# Patient Record
Sex: FEMALE | Race: WHITE | HISPANIC OR LATINO | Employment: FULL TIME | URBAN - METROPOLITAN AREA
[De-identification: names, ages, dates, MRNs, and addresses within clinical notes are randomized per-mention and may not be internally consistent; named-entity substitution may affect disease eponyms.]

---

## 2017-01-18 RX ORDER — HYDROCORTISONE 2.5% 25 MG/G
CREAM TOPICAL
Qty: 30 G | Refills: 0 | Status: SHIPPED | OUTPATIENT
Start: 2017-01-18 | End: 2018-05-16

## 2017-03-01 ENCOUNTER — TELEPHONE (OUTPATIENT)
Dept: FAMILY MEDICINE | Facility: CLINIC | Age: 52
End: 2017-03-01

## 2017-03-01 NOTE — TELEPHONE ENCOUNTER
----- Message from Mehreen Salazar sent at 3/1/2017  9:02 AM CST -----  Contact: self  Would like an appt after lunch for possible sinus infection.  Please call back at .

## 2017-03-01 NOTE — TELEPHONE ENCOUNTER
Returned pt call. Informed pt that Dr Cuello didn't have any availabilities and offered pt appt in [place]. Pt refused stating that she will go to

## 2017-07-20 RX ORDER — BUPROPION HYDROCHLORIDE 200 MG/1
TABLET, EXTENDED RELEASE ORAL
Qty: 180 TABLET | Refills: 0 | Status: SHIPPED | OUTPATIENT
Start: 2017-07-20 | End: 2018-05-23 | Stop reason: SDUPTHER

## 2017-07-21 NOTE — TELEPHONE ENCOUNTER
----- Message from Misty Koenig sent at 7/21/2017  9:21 AM CDT -----  Contact: self    [phone]  Patient returning the nurse phone call.Thanks!

## 2017-07-21 NOTE — TELEPHONE ENCOUNTER
Pt informed of the need to maria ines appt for future med refills, per protocol. Pt states that she is unable to maria ines appt at the moment and will call back to [facility] when she has her calendar available. Pt verbalized understanding.

## 2017-07-27 ENCOUNTER — TELEPHONE (OUTPATIENT)
Dept: FAMILY MEDICINE | Facility: CLINIC | Age: 52
End: 2017-07-27

## 2017-07-27 DIAGNOSIS — G47.33 OSA (OBSTRUCTIVE SLEEP APNEA): Primary | ICD-10-CM

## 2017-07-27 NOTE — TELEPHONE ENCOUNTER
----- Message from Jess De La O sent at 7/27/2017  9:36 AM CDT -----  Contact: self  Patient called regarding scheduling another [facility] with [facility] [place]. Not sure if another referral is needed to do so. Please contact [phone] (home)

## 2017-08-23 ENCOUNTER — OFFICE VISIT (OUTPATIENT)
Dept: FAMILY MEDICINE | Facility: CLINIC | Age: 52
End: 2017-08-23
Payer: COMMERCIAL

## 2017-08-23 VITALS
BODY MASS INDEX: 27.67 KG/M2 | HEIGHT: 66 IN | HEART RATE: 88 BPM | SYSTOLIC BLOOD PRESSURE: 112 MMHG | TEMPERATURE: 98 F | DIASTOLIC BLOOD PRESSURE: 68 MMHG | WEIGHT: 172.19 LBS

## 2017-08-23 DIAGNOSIS — J02.9 PHARYNGITIS, UNSPECIFIED ETIOLOGY: Primary | ICD-10-CM

## 2017-08-23 LAB
CTP QC/QA: YES
S PYO RRNA THROAT QL PROBE: NEGATIVE

## 2017-08-23 PROCEDURE — 99213 OFFICE O/P EST LOW 20 MIN: CPT | Mod: S$GLB,,, | Performed by: FAMILY MEDICINE

## 2017-08-23 PROCEDURE — 3008F BODY MASS INDEX DOCD: CPT | Mod: S$GLB,,, | Performed by: FAMILY MEDICINE

## 2017-08-23 PROCEDURE — 99999 PR PBB SHADOW E&M-EST. PATIENT-LVL III: CPT | Mod: PBBFAC,,, | Performed by: FAMILY MEDICINE

## 2017-08-23 PROCEDURE — 87880 STREP A ASSAY W/OPTIC: CPT | Mod: QW,S$GLB,, | Performed by: FAMILY MEDICINE

## 2017-08-23 NOTE — PATIENT INSTRUCTIONS
For a sore throat:  Slice a lemon very thin.  Place in sauce pan with 2 big glops of honey  Simmer low heat for 30 minutes. Dilute with tea or water and sip to soothe and heal your sore raw throat.

## 2017-08-23 NOTE — PROGRESS NOTES
"Subjective:       Patient ID: Georgia Gomez is a 52 y.o. female.    Chief Complaint: Sore Throat    Ms Gomez, 53yo WF, PMH JUSTIN, headache, depression. Presents w/ painful throat, diffuse body aches, cough, stuffy/runny nose.    Stuffy/runny nose began several days ago. Progressed to painful throat like "glass in throat" by yesterday. Accompanied by diffuse body aches. Has started coughing, nonproductive. Has tried advil and hot liquids which help.   Denies fever/chills/nausea/vomiting. Denies bowel changes, SOB, chest pain.   Was in [place] for vacation.     Performed POCT rapid strep - negative.     Counseled on hydration and supportive treatments like lemon and honey.       Dizziness:    Associated symptoms: headaches.no weakness.   PMH includes: anxiety.  Anxiety   Symptoms include dizziness.       Sore Throat    Associated symptoms include headaches.     Review of Systems   Constitutional: Positive for activity change.   Eyes: Negative for discharge.   Respiratory: Negative.    Cardiovascular: Negative.    Gastrointestinal: Negative.    Musculoskeletal: Positive for myalgias.   Neurological: Positive for headaches. Negative for syncope and weakness.       Objective:     Blood pressure 112/68, pulse 88, temperature 98.2 °F (36.8 °C), temperature source Oral, height 5' 6" (1.676 m), weight 78.1 kg (172 lb 2.9 oz), last menstrual period 10/01/2014.      Physical Exam   Constitutional: She is oriented to person, place, and time. She appears well-developed and well-nourished. No distress.   HENT:   Head: Normocephalic and atraumatic.   Right Ear: External ear and ear canal normal. No swelling or tenderness. Tympanic membrane is not injected, not scarred, not perforated, not erythematous, not retracted and not bulging.   Left Ear: External ear and ear canal normal. No swelling or tenderness. Tympanic membrane is not injected, not scarred, not perforated, not erythematous, not retracted and not bulging.   Nose: Nose " normal.   Mouth/Throat: Oropharynx is clear and moist and mucous membranes are normal. Mucous membranes are not pale, not dry and not cyanotic. Normal dentition. No uvula swelling. No oropharyngeal exudate or tonsillar abscesses. No tonsillar exudate.   Eyes: Conjunctivae and EOM are normal. Pupils are equal, round, and reactive to light. Right eye exhibits no discharge. Left eye exhibits no discharge. No scleral icterus.   Neck: Normal range of motion.   Cardiovascular: Normal rate, regular rhythm and normal heart sounds.  Exam reveals no gallop and no friction rub.    No murmur heard.  Pulmonary/Chest: Effort normal and breath sounds normal. No respiratory distress. She has no wheezes. She exhibits no tenderness.   Abdominal: Soft. Bowel sounds are normal.   Musculoskeletal: Normal range of motion. She exhibits no edema, tenderness or deformity.   Lymphadenopathy:     She has no cervical adenopathy.   Neurological: She is alert and oriented to person, place, and time.   Skin: Skin is warm and dry. No rash noted. She is not diaphoretic. No erythema. No pallor.   Psychiatric: She has a normal mood and affect. Her behavior is normal. Judgment and thought content normal.       Assessment:       1. Pharyngitis, unspecified etiology        Plan:        1. Likely viral pharyngitis. No lymph nodes, no pharyngeal, no fever. Strep test negative. Counseled on supportive treatment with fluids and liquids. Advised to contact if symptoms become worse.

## 2017-10-26 RX ORDER — BUPROPION HYDROCHLORIDE 200 MG/1
TABLET, EXTENDED RELEASE ORAL
Qty: 180 TABLET | Refills: 0 | Status: SHIPPED | OUTPATIENT
Start: 2017-10-26 | End: 2018-05-16 | Stop reason: SDUPTHER

## 2018-04-18 ENCOUNTER — HOSPITAL ENCOUNTER (OUTPATIENT)
Dept: RADIOLOGY | Facility: HOSPITAL | Age: 53
Discharge: HOME | End: 2018-04-18
Attending: PHYSICIAN ASSISTANT
Payer: COMMERCIAL

## 2018-04-18 ENCOUNTER — OFFICE VISIT (OUTPATIENT)
Dept: SPINE | Facility: CLINIC | Age: 53
End: 2018-04-18
Payer: COMMERCIAL

## 2018-04-18 VITALS
BODY MASS INDEX: 27.99 KG/M2 | HEIGHT: 66 IN | HEART RATE: 88 BPM | WEIGHT: 174.19 LBS | SYSTOLIC BLOOD PRESSURE: 96 MMHG | DIASTOLIC BLOOD PRESSURE: 60 MMHG

## 2018-04-18 DIAGNOSIS — M25.551 RIGHT HIP PAIN: ICD-10-CM

## 2018-04-18 DIAGNOSIS — M54.16 LUMBAR RADICULOPATHY: ICD-10-CM

## 2018-04-18 DIAGNOSIS — M54.16 LUMBAR RADICULOPATHY: Primary | ICD-10-CM

## 2018-04-18 DIAGNOSIS — M54.41 ACUTE RIGHT-SIDED LOW BACK PAIN WITH RIGHT-SIDED SCIATICA: ICD-10-CM

## 2018-04-18 PROCEDURE — 73502 X-RAY EXAM HIP UNI 2-3 VIEWS: CPT | Mod: TC,PO,RT

## 2018-04-18 PROCEDURE — 73502 X-RAY EXAM HIP UNI 2-3 VIEWS: CPT | Mod: 26,RT,, | Performed by: RADIOLOGY

## 2018-04-18 PROCEDURE — 72114 X-RAY EXAM L-S SPINE BENDING: CPT | Mod: TC,PO

## 2018-04-18 PROCEDURE — 99203 OFFICE O/P NEW LOW 30 MIN: CPT | Mod: S$GLB,,, | Performed by: PHYSICIAN ASSISTANT

## 2018-04-18 PROCEDURE — 72114 X-RAY EXAM L-S SPINE BENDING: CPT | Mod: 26,,, | Performed by: RADIOLOGY

## 2018-04-18 PROCEDURE — 99999 PR PBB SHADOW E&M-EST. PATIENT-LVL III: CPT | Mod: PBBFAC,,, | Performed by: PHYSICIAN ASSISTANT

## 2018-04-18 RX ORDER — ESTRADIOL 0.05 MG/D
1 FILM, EXTENDED RELEASE TRANSDERMAL
COMMUNITY

## 2018-04-18 RX ORDER — METHYLPREDNISOLONE 4 MG/1
TABLET ORAL
Qty: 1 PACKAGE | Refills: 0 | Status: SHIPPED | OUTPATIENT
Start: 2018-04-18 | End: 2018-05-09

## 2018-04-19 RX ORDER — TIZANIDINE 4 MG/1
4 TABLET ORAL EVERY 8 HOURS PRN
Qty: 40 TABLET | Refills: 0 | Status: SHIPPED | OUTPATIENT
Start: 2018-04-19 | End: 2018-05-29

## 2018-04-20 NOTE — PROGRESS NOTES
Neurosurgery History & Physical    Patient ID: Georgia Gomez is a 52 y.o. female.    Chief Complaint   Patient presents with    Leg Pain     Has had pain in her right leg for many months. Pain is constant. Can't put weight on right side. Nothing makes pain better. Sitting makes pain worse.       Review of Systems   Constitutional: Negative for activity change, appetite change, chills, fever and unexpected weight change.   HENT: Negative for tinnitus, trouble swallowing and voice change.    Respiratory: Negative for apnea, cough, chest tightness and shortness of breath.    Cardiovascular: Negative for chest pain and palpitations.   Gastrointestinal: Negative for constipation, diarrhea, nausea and vomiting.   Genitourinary: Negative for difficulty urinating, dysuria, frequency and urgency.   Musculoskeletal: Positive for back pain and myalgias. Negative for gait problem, neck pain and neck stiffness.   Skin: Negative for wound.   Neurological: Negative for dizziness, tremors, seizures, facial asymmetry, speech difficulty, weakness, light-headedness, numbness and headaches.   Psychiatric/Behavioral: Negative for confusion and decreased concentration.       Past Medical History:   Diagnosis Date    Arthritis     Headache(784.0)     Rash      Social History     Social History    Marital status:      Spouse name: N/A    Number of children: N/A    Years of education: N/A     Occupational History    Not on file.     Social History Main Topics    Smoking status: Never Smoker    Smokeless tobacco: Never Used    Alcohol use Yes      Comment: occ    Drug use: No    Sexual activity: Not on file     Other Topics Concern    Not on file     Social History Narrative    She currently works as a .      Family History   Problem Relation Age of Onset    Diabetes Mother     Hypertension Mother     Cancer Father      lung ca    Heart disease Father     Hyperlipidemia Father     Hypertension  "Father     Cancer Sister      Non Hodgkin's lymphoma    No Known Problems Daughter     No Known Problems Son      Review of patient's allergies indicates:  No Known Allergies    Current Outpatient Prescriptions:     estradiol 0.05 mg/24 hr td ptsw (VIVELLE-DOT) 0.05 mg/24 hr, Place 1 patch onto the skin once a week., Disp: , Rfl:     buPROPion (WELLBUTRIN SR) 200 MG Tb12, TAKE 1 TABLET(200 MG) BY MOUTH TWICE DAILY, Disp: 180 tablet, Rfl: 0    buPROPion (WELLBUTRIN SR) 200 MG Tb12, TAKE 1 TABLET(200 MG) BY MOUTH TWICE DAILY, Disp: 180 tablet, Rfl: 0    CMPD testosterone proprionate 2% in vanicream, Apply topically once daily. Apply 1 gram as directed 2-3 times weekly., Disp: , Rfl:     ibuprofen (ADVIL,MOTRIN) 400 MG tablet, Take 400 mg by mouth every 4 (four) hours., Disp: , Rfl:     methylPREDNISolone (MEDROL, KAUSHIK,) 4 mg tablet, use as directed, Disp: 1 Package, Rfl: 0    multivitamin (ONE DAILY MULTIVITAMIN) per tablet, Take 1 tablet by mouth once daily., Disp: , Rfl:     PROCTOZONE-HC 2.5 % rectal cream, PLACE RECTALLY TWICE DAILY AS DIRECTED, Disp: 30 g, Rfl: 0    tiZANidine (ZANAFLEX) 4 MG tablet, Take 1 tablet (4 mg total) by mouth every 8 (eight) hours as needed (muscle spasm)., Disp: 40 tablet, Rfl: 0    Vitals:    04/18/18 1346   BP: 96/60   BP Location: Left arm   Patient Position: Sitting   BP Method: Medium (Manual)   Pulse: 88   Weight: 79 kg (174 lb 2.6 oz)   Height: 5' 6" (1.676 m)       Physical Exam   Constitutional: She is oriented to person, place, and time. She appears well-developed and well-nourished.   HENT:   Head: Normocephalic and atraumatic.   Eyes: Pupils are equal, round, and reactive to light.   Neck: Normal range of motion. Neck supple.   Cardiovascular: Normal rate.    Pulmonary/Chest: Effort normal.   Musculoskeletal: Normal range of motion. She exhibits no edema.   Neurological: She is alert and oriented to person, place, and time. She has a normal Finger-Nose-Finger " Test, a normal Heel to Shin Test, a normal Romberg Test and a normal Tandem Gait Test. Gait normal.   Reflex Scores:       Tricep reflexes are 2+ on the right side and 2+ on the left side.       Bicep reflexes are 2+ on the right side and 2+ on the left side.       Brachioradialis reflexes are 2+ on the right side and 2+ on the left side.       Patellar reflexes are 2+ on the right side and 2+ on the left side.       Achilles reflexes are 2+ on the right side and 2+ on the left side.  Skin: Skin is warm, dry and intact.   Psychiatric: She has a normal mood and affect. Her speech is normal and behavior is normal. Judgment and thought content normal.   Nursing note and vitals reviewed.      Neurologic Exam     Mental Status   Oriented to person, place, and time.   Oriented to person.   Oriented to place.   Oriented to time.   Follows 3 step commands.   Attention: normal. Concentration: normal.   Speech: speech is normal   Level of consciousness: alert  Knowledge: consistent with education.   Able to name object. Able to read. Able to repeat. Able to write. Normal comprehension.     Cranial Nerves     CN II   Visual acuity: normal  Right visual field deficit: none  Left visual field deficit: none     CN III, IV, VI   Pupils are equal, round, and reactive to light.  Right pupil: Size: 3 mm. Shape: regular. Reactivity: brisk. Consensual response: intact.   Left pupil: Size: 3 mm. Shape: regular. Reactivity: brisk. Consensual response: intact.   CN III: no CN III palsy  CN VI: no CN VI palsy  Nystagmus: none   Diplopia: none  Ophthalmoparesis: none  Conjugate gaze: present    CN V   Right facial sensation deficit: none  Left facial sensation deficit: none    CN VII   Right facial weakness: none  Left facial weakness: none    CN VIII   Hearing: intact    CN IX, X   CN IX normal.   CN X normal.     CN XI   Right sternocleidomastoid strength: normal  Left sternocleidomastoid strength: normal  Right trapezius strength:  normal  Left trapezius strength: normal    CN XII   Fasciculations: absent  Tongue deviation: none    Motor Exam   Muscle bulk: normal  Overall muscle tone: normal  Right arm pronator drift: absent  Left arm pronator drift: absent    Strength   Right neck flexion: 5/5  Left neck flexion: 5/5  Right neck extension: 5/5  Left neck extension: 5/5  Right deltoid: 5/5  Left deltoid: 5/5  Right biceps: 5/5  Left biceps: 5/5  Right triceps: 5/5  Left triceps: 5/5  Right wrist flexion: 5/5  Left wrist flexion: 5/5  Right wrist extension: 5/5  Left wrist extension: /5  Right interossei: /5  Left interossei: /  Right abdominals:   Left abdominals:   Right iliopsoas:   Left iliopsoas:   Right quadriceps:   Left quadriceps:   Right hamstrin/5  Left hamstrin/5  Right glutei:   Left glutei:   Right anterior tibial:   Left anterior tibial:   Right posterior tibial:   Left posterior tibial:   Right peroneal:   Left peroneal:   Right gastroc:   Left gastroc:     Sensory Exam   Right arm light touch: normal  Left arm light touch: normal  Right leg light touch: normal  Left leg light touch: normal  Right arm vibration: normal  Left arm vibration: normal  Right arm pinprick: normal  Left arm pinprick: normal    Gait, Coordination, and Reflexes     Gait  Gait: normal    Coordination   Romberg: negative  Finger to nose coordination: normal  Heel to shin coordination: normal  Tandem walking coordination: normal    Tremor   Resting tremor: absent  Intention tremor: absent  Action tremor: absent    Reflexes   Right brachioradialis: 2+  Left brachioradialis: 2+  Right biceps: 2+  Left biceps: 2+  Right triceps: 2+  Left triceps: 2+  Right patellar: 2+  Left patellar: 2+  Right achilles: 2+  Left achilles: 2+  Right Harper: absent  Left Harper: absent  Right ankle clonus: absent  Left ankle clonus: absent      Provider dictation:  52 year old female is self referred for right leg  pain.  She has felt pain from the right buttocks to the right kinsey-lateral thigh for months.  It is described as a shooting pain.  It is progressing in intensity.  She is tender to the touch over the right lateral hip area.  She has tried advil without improvement and has not had any other treatments.  She is hoping to get relief soon as her daughter is getting  in 9 days.  Oswestry score: 22%.  PHQ:  1.    She has an antalgic gait and sits with the right leg straight due to pain.  She has full strength and no sensory deficits.  She as 2+ muscle stretch reflexes in the upper and lower extremities.    She has not had any imaging.    Mrs. Gomez has right lateral thigh pain - possibly right L3 radiculopathy without focal neurological deficit.  I am going to prescribe a medrol dose pack and zanaflex to help with pain.  Hopefully it helps quickly in time for her daughters wedding.  We also discussed referring her for [place], but she would need an MRI prior to it and it may not be enough notice for our pain management physicians to fit her in their schedule for the injection prior to the wedding.  She prefers to hold on MRI and any injection until after the wedding.  I will send her for xrays today of the lower back and right hip to rule out hip pathology and assess for any lumbar pathology that may be contributing to symptoms.  I will call with xray results.    Visit Diagnosis:  Lumbar radiculopathy  -     methylPREDNISolone (MEDROL, KAUSHIK,) 4 mg tablet; use as directed  Dispense: 1 Package; Refill: 0  -     Cancel: MRI Lumbar Spine Without Contrast; Future; Expected date: 04/18/2018  -     X-Ray Lumbar Complete With Flex And Ext; Future; Expected date: 04/18/2018  -     tiZANidine (ZANAFLEX) 4 MG tablet; Take 1 tablet (4 mg total) by mouth every 8 (eight) hours as needed (muscle spasm).  Dispense: 40 tablet; Refill: 0    Acute right-sided low back pain with right-sided sciatica  -     X-Ray Lumbar Complete With  Flex And Ext; Future; Expected date: 04/18/2018  -     tiZANidine (ZANAFLEX) 4 MG tablet; Take 1 tablet (4 mg total) by mouth every 8 (eight) hours as needed (muscle spasm).  Dispense: 40 tablet; Refill: 0    Right hip pain  -     X-Ray Hip 2 View Right; Future; Expected date: 04/18/2018  -     tiZANidine (ZANAFLEX) 4 MG tablet; Take 1 tablet (4 mg total) by mouth every 8 (eight) hours as needed (muscle spasm).  Dispense: 40 tablet; Refill: 0        Total time spent counseling greater than fifty percent of total visit time.  Counseling included discussion regarding imaging findings, diagnosis possibilities, treatment options, risks and benefits.   The patient had many questions regarding the options and long-term effects.

## 2018-05-16 ENCOUNTER — TELEPHONE (OUTPATIENT)
Dept: FAMILY MEDICINE | Facility: CLINIC | Age: 53
End: 2018-05-16

## 2018-05-16 ENCOUNTER — OFFICE VISIT (OUTPATIENT)
Dept: FAMILY MEDICINE | Facility: CLINIC | Age: 53
End: 2018-05-16
Payer: COMMERCIAL

## 2018-05-16 VITALS
SYSTOLIC BLOOD PRESSURE: 120 MMHG | HEART RATE: 80 BPM | HEIGHT: 66 IN | DIASTOLIC BLOOD PRESSURE: 82 MMHG | TEMPERATURE: 99 F | OXYGEN SATURATION: 99 % | WEIGHT: 173.75 LBS | BODY MASS INDEX: 27.92 KG/M2

## 2018-05-16 DIAGNOSIS — J02.9 PHARYNGITIS, UNSPECIFIED ETIOLOGY: Primary | ICD-10-CM

## 2018-05-16 DIAGNOSIS — Z12.39 SCREENING BREAST EXAMINATION: Primary | ICD-10-CM

## 2018-05-16 DIAGNOSIS — R50.9 ACUTE FEBRILE ILLNESS: ICD-10-CM

## 2018-05-16 DIAGNOSIS — M79.10 MYALGIA: ICD-10-CM

## 2018-05-16 DIAGNOSIS — B34.9 ACUTE VIRAL SYNDROME: ICD-10-CM

## 2018-05-16 LAB
CTP QC/QA: YES
CTP QC/QA: YES
FLUAV AG NPH QL: NEGATIVE
FLUBV AG NPH QL: NEGATIVE
S PYO RRNA THROAT QL PROBE: NEGATIVE

## 2018-05-16 PROCEDURE — 87804 INFLUENZA ASSAY W/OPTIC: CPT | Mod: QW,S$GLB,, | Performed by: INTERNAL MEDICINE

## 2018-05-16 PROCEDURE — 3008F BODY MASS INDEX DOCD: CPT | Mod: CPTII,S$GLB,, | Performed by: INTERNAL MEDICINE

## 2018-05-16 PROCEDURE — 87880 STREP A ASSAY W/OPTIC: CPT | Mod: QW,S$GLB,, | Performed by: INTERNAL MEDICINE

## 2018-05-16 PROCEDURE — 99999 PR PBB SHADOW E&M-EST. PATIENT-LVL III: CPT | Mod: PBBFAC,,, | Performed by: INTERNAL MEDICINE

## 2018-05-16 PROCEDURE — 99213 OFFICE O/P EST LOW 20 MIN: CPT | Mod: 25,S$GLB,, | Performed by: INTERNAL MEDICINE

## 2018-05-16 PROCEDURE — 87081 CULTURE SCREEN ONLY: CPT

## 2018-05-16 NOTE — PATIENT INSTRUCTIONS
Pharyngitis, unspecified etiology; chloraseptic for sore throat; warm salt water gargle 1-3x a day as needed; tylenol sore throat as need as well.         Will also check monospot and CBC; call us in next few days to update on status  -     POCT Rapid Strep A  -     Strep A culture, throat  -     POCT Influenza A/B    Acute viral syndrome; tylenol sore throat will also help w this entity. Rest w fluids  -     POCT Rapid Strep A  -     Strep A culture, throat  -     POCT Influenza A/B    Acute febrile illness; XS tylenol for fever; max 3000 mg a day of tylenol; separate from tylenol sore throat by 4 hrs if used. Aleve for persistent temp as needed.    Myalgia; rest and fluids

## 2018-05-16 NOTE — LETTER
May 17, 2018    Georgia Gomez  [address] [place] [address]             [facility]  [address]  [place] [address]  Phone: [phone]  Fax: [phone] Dear Ms. Castilloviridiana:    Our records indicate that you are due for a mammogram.    In the [place], one in nine women will develop breast cancer during their lifetime. While there is no way to prevent breast cancer, early detection provides the best opportunity for curing it.     For women over the age of 40, the American Cancer Society recommends a yearly clinical breast exam and a yearly mammogram. These practices have saved thousands of lives. We need your help to ensure that you are receiving optimal medical care.    Please make an appointment for a mammogram at your earliest convenience.    Sincerely,        [unfilled]

## 2018-05-16 NOTE — PROGRESS NOTES
Subjective:       Patient ID: Georgia Gomez is a 53 y.o. female.    Chief Complaint: Fever (all started yesterday); Sore Throat; and Generalized Body Aches    HPI  Pt today here in the office for a PCP Dr.Lauren Cuello.  Patient is a nonsmoker and has occasional seasonal ALLERGIES; but reportedly not at present. She reportedly hurts to swallow.  She's had fever at 100.5, 100.2, 100.7 last 24 hours.  She takes Motrin 4-6 hours when necessary temp; no recent travel noted.  Sore throat started Monday in the morning with a lump sensation since then.  No postnasal drip or cough.  She does have diffuse myalgias but no headaches, but fatigue which is rather intense lately, but she has no cough, nausea, vomiting, or abdominal pain, and no colored phlegm.  Strep A screen and culture Strep A was ordered; she reportedly had sweating last night; states it feels like the flu.  She has no other sick family members.    Review of Systems   Constitutional: Positive for activity change, appetite change, chills, diaphoresis, fatigue and fever. Negative for unexpected weight change.   HENT: Positive for sore throat and trouble swallowing. Negative for congestion, postnasal drip, rhinorrhea and sinus pressure.    Respiratory: Negative for cough, shortness of breath and wheezing.    Cardiovascular: Negative for palpitations and leg swelling.   Gastrointestinal: Negative for abdominal pain, blood in stool, constipation, diarrhea, nausea and vomiting.   Genitourinary: Negative for dysuria and hematuria.   Musculoskeletal: Positive for myalgias. Negative for arthralgias.        Diffuse myalgias.   Skin: Negative for rash.   Allergic/Immunologic: Negative for environmental allergies and food allergies.   Neurological: Negative for tremors, weakness, light-headedness and headaches.       Objective:      Vitals:    05/16/18 1012   BP: 120/82   BP Location: Right arm   Patient Position: Sitting   BP Method: Medium (Manual)   Pulse: 80   Temp:  "98.5 °F (36.9 °C)   TempSrc: Oral   SpO2: 99%   Weight: 78.8 kg (173 lb 11.6 oz)   Height: 5' 6" (1.676 m)     Body mass index is 28.04 kg/m².    Physical Exam   Constitutional: She is oriented to person, place, and time. She appears well-developed and well-nourished.   HENT:   Head: Normocephalic and atraumatic.   TM's pink; slightly congested; NM swollen; slightly inflamed; clear to yel-white mucus; throat and post pharynx inflamed. No sinus tenderness to palp.   Eyes: EOM are normal.   Neck: Normal range of motion. Neck supple. No thyromegaly present.   Cardiovascular: Normal rate, regular rhythm and normal heart sounds.  Exam reveals no gallop.    No murmur heard.  Pulmonary/Chest: Effort normal and breath sounds normal. No respiratory distress. She has no wheezes. She has no rales.   Abdominal: Soft. Bowel sounds are normal. She exhibits no distension. There is no tenderness. There is no rebound and no guarding.   Musculoskeletal: Normal range of motion. She exhibits no edema.   Lymphadenopathy:     She has no cervical adenopathy.   Neurological: She is alert and oriented to person, place, and time.   Moves all 4 extremities fine.   Skin: No rash noted.   Psychiatric: She has a normal mood and affect. Her behavior is normal. Thought content normal.   Vitals reviewed.      Assessment:       1. Pharyngitis, unspecified etiology    2. Acute viral syndrome    3. Acute febrile illness    4. Myalgia        Plan:       Pharyngitis, unspecified etiology; chloraseptic for sore throat; warm salt water gargle 1-3x a day as needed; tylenol sore throat as need as well.         Will also check monospot and CBC; call us in next few days to update on status  -     POCT Rapid Strep A was reportedly negative  -     Strep A culture, throat; no strep group A isolated from culture  -     POCT Influenza A/B; which returned back negative    Acute viral syndrome; tylenol sore throat will also help w this entity. Rest w fluids  -     " POCT Rapid Strep A was reportedly negative  -     Strep A culture, throat; no strep group A isolated from culture  -     POCT Influenza A/B; which returned back negative    Acute febrile illness; XS tylenol for fever; max 3000 mg a day of tylenol; separate from tylenol sore throat by 4 hrs if used. Aleve for persistent temp as needed.    Myalgia; rest and fluids

## 2018-05-16 NOTE — TELEPHONE ENCOUNTER
Pt need annual mammogram.     Orders pending.     Last mammogram was done on 04/24/2017    Thank you.

## 2018-05-18 LAB — BACTERIA THROAT CULT: NORMAL

## 2018-05-21 ENCOUNTER — TELEPHONE (OUTPATIENT)
Dept: FAMILY MEDICINE | Facility: CLINIC | Age: 53
End: 2018-05-21

## 2018-05-21 NOTE — TELEPHONE ENCOUNTER
: Check on patient's status tomorrow by phone; and if unimproved she still needs to get a CBC with Monospot, and schedule an appointment for reassessment

## 2018-05-22 NOTE — TELEPHONE ENCOUNTER
Spoke with pt and she said that she is feeling great. She said that the day after she was better and that she went back to work without any problems. She was not able to get the labs done the day of her apt due to the lab was closed for lunch.

## 2018-05-23 NOTE — TELEPHONE ENCOUNTER
Please call the patient and tell her since she's feeling so much better she can hold on getting a CBC and a Monospot. Glad she is able to return to work and is doing a lot better.

## 2018-05-23 NOTE — TELEPHONE ENCOUNTER
Spoke with pt and informed her since she is feeling much better that she can hold on getting the CBC and monospot lab done.    Pt verbally understands.

## 2018-05-23 NOTE — TELEPHONE ENCOUNTER
----- Message from Leda Edmond sent at 5/23/2018  4:07 PM CDT -----  Contact: pt   Pt calling states needs a refill buPROPion (WELLBUTRIN SR) 200 MG Tb12,pt is completely out..[phone] (please notify when sent)              .    [facility]/pharm[facility] #[id number] - [place] - [address] [address]  [address] [address]  Phone: [phone] Fax: [phone]

## 2018-05-24 RX ORDER — BUPROPION HYDROCHLORIDE 200 MG/1
TABLET, EXTENDED RELEASE ORAL
Qty: 180 TABLET | Refills: 0 | Status: SHIPPED | OUTPATIENT
Start: 2018-05-24 | End: 2018-07-16 | Stop reason: SDUPTHER

## 2018-05-29 ENCOUNTER — OFFICE VISIT (OUTPATIENT)
Dept: FAMILY MEDICINE | Facility: CLINIC | Age: 53
End: 2018-05-29
Payer: COMMERCIAL

## 2018-05-29 VITALS
HEIGHT: 66 IN | OXYGEN SATURATION: 98 % | TEMPERATURE: 98 F | WEIGHT: 175.38 LBS | BODY MASS INDEX: 28.19 KG/M2 | DIASTOLIC BLOOD PRESSURE: 60 MMHG | SYSTOLIC BLOOD PRESSURE: 100 MMHG | HEART RATE: 83 BPM

## 2018-05-29 DIAGNOSIS — Z00.00 ROUTINE HEALTH MAINTENANCE: ICD-10-CM

## 2018-05-29 DIAGNOSIS — F32.4 MAJOR DEPRESSIVE DISORDER WITH SINGLE EPISODE, IN PARTIAL REMISSION: Primary | ICD-10-CM

## 2018-05-29 DIAGNOSIS — E66.3 OVERWEIGHT: ICD-10-CM

## 2018-05-29 DIAGNOSIS — Z12.11 SCREEN FOR COLON CANCER: ICD-10-CM

## 2018-05-29 PROCEDURE — 99214 OFFICE O/P EST MOD 30 MIN: CPT | Mod: S$GLB,,, | Performed by: FAMILY MEDICINE

## 2018-05-29 PROCEDURE — 3008F BODY MASS INDEX DOCD: CPT | Mod: CPTII,S$GLB,, | Performed by: FAMILY MEDICINE

## 2018-05-29 PROCEDURE — 99999 PR PBB SHADOW E&M-EST. PATIENT-LVL III: CPT | Mod: PBBFAC,,, | Performed by: FAMILY MEDICINE

## 2018-05-29 NOTE — PROGRESS NOTES
"Subjective:       Patient ID: Georgia Gomez is a 53 y.o. female.    Chief Complaint: med review    HPI  Patient in the office for med review.  Doing well overall on her current regimen. Requests to leave dose as is for now.   HRT regimen per gyn.   Discussed need for colon screening.   Now using cpap consistently. "life changing" not exhausted. Sleeping well.   Concerned about inability to lose weight.   R sciatica with radiation. Has not done PT. Saw PA in [facility] for pain associated, and was on muscle relaxer and steroid. Improved for now.    X hip and x lumbar reviewed.    Review of Systems   Constitutional: Negative for activity change, fatigue (improved) and unexpected weight change.   HENT: Negative for hearing loss, rhinorrhea and trouble swallowing.    Eyes: Negative for discharge and visual disturbance.   Respiratory: Negative for chest tightness and wheezing.    Cardiovascular: Negative for chest pain and palpitations.   Gastrointestinal: Negative for blood in stool, constipation, diarrhea and vomiting.   Endocrine: Negative for polydipsia and polyuria.   Genitourinary: Negative for difficulty urinating, dysuria, hematuria and menstrual problem.   Musculoskeletal: Negative for arthralgias, joint swelling and neck pain.   Neurological: Negative for weakness and headaches.   Psychiatric/Behavioral: Negative for confusion and dysphoric mood.       Objective:      Physical Exam   Constitutional: She is oriented to person, place, and time. She appears well-developed and well-nourished. No distress.   HENT:   Head: Normocephalic and atraumatic.   Eyes: Conjunctivae are normal. Right eye exhibits no discharge. Left eye exhibits no discharge. No scleral icterus.   Neck: Normal range of motion. Neck supple.   Cardiovascular: Normal rate and regular rhythm.    Pulmonary/Chest: Effort normal and breath sounds normal. No respiratory distress.   Abdominal: Soft. She exhibits no distension.   Musculoskeletal: Normal " range of motion. She exhibits no edema.   Neurological: She is alert and oriented to person, place, and time.   Skin: Skin is warm and dry. No rash noted.   Psychiatric: She has a normal mood and affect. Her behavior is normal.   Nursing note and vitals reviewed.        Screen for colon cancer  Options in screening for colon cancer were reviewed to include hemoccult cards annually, cologuard every 3 years, and colonoscopy. Pros and cons of each procedure were briefly reviewed as well.    Major depressive disorder with single episode, in partial remission  Cont current regimen.  Overweight  -     Ambulatory consult to Bariatric Medicine  Recommend f/u with Dr Brice to review.  Routine health maintenance  -     CBC auto differential; Future; Expected date: 05/29/2018  -     Comprehensive metabolic panel; Future; Expected date: 05/29/2018  -     Hemoglobin A1c; Future; Expected date: 05/29/2018  -     Lipid panel; Future; Expected date: 05/29/2018  -     TSH; Future; Expected date: 05/29/2018  -     Vitamin D; Future; Expected date: 05/29/2018  Update with next lab draw at gyn.

## 2018-07-05 LAB — HEMOCCULT STL QL IA: NEGATIVE

## 2018-07-16 ENCOUNTER — PATIENT MESSAGE (OUTPATIENT)
Dept: FAMILY MEDICINE | Facility: CLINIC | Age: 53
End: 2018-07-16

## 2018-07-16 RX ORDER — BUPROPION HYDROCHLORIDE 200 MG/1
TABLET, EXTENDED RELEASE ORAL
Qty: 180 TABLET | Refills: 2 | Status: SHIPPED | OUTPATIENT
Start: 2018-07-16 | End: 2019-04-23 | Stop reason: SDUPTHER

## 2018-07-16 NOTE — TELEPHONE ENCOUNTER
----- Message from Mehreen Salazar sent at 7/16/2018  9:34 AM CDT -----  Contact: Dorita SHORE  Please resend this prescription buPROPion (WELLBUTRIN SR) 200 MG Tb12.    [id number] - [place] - [address] [address]  [address] [address]  Phone: [phone] Fax: [phone]

## 2018-07-24 ENCOUNTER — TELEPHONE (OUTPATIENT)
Dept: ADMINISTRATIVE | Facility: HOSPITAL | Age: 53
End: 2018-07-24

## 2018-07-31 ENCOUNTER — TELEPHONE (OUTPATIENT)
Dept: FAMILY MEDICINE | Facility: CLINIC | Age: 53
End: 2018-07-31

## 2018-07-31 LAB
CHOL/HDLC RATIO: 2.8
CHOLESTEROL, TOTAL: 174
HDLC SERPL-MCNC: 62 MG/DL
LDL/HDL RATIO: 1.5
LDLC SERPL CALC-MCNC: 92 MG/DL
NON HDL CHOL. (LDL+VLDL): 112
TRIGL SERPL-MCNC: 102 MG/DL
VLDLC SERPL-MCNC: 20 MG/DL

## 2018-07-31 NOTE — TELEPHONE ENCOUNTER
[employer]    [phone]    Pt has had labs drawn this morning at [facility].     On hold for 5 mins no answer not able to leave message. Will need to try again in am.

## 2018-07-31 NOTE — TELEPHONE ENCOUNTER
----- Message from Renee Stevenson sent at 7/31/2018  8:13 AM CDT -----  Contact: Patient  Type: Needs Medical Advice    Who Called:  Patient  Symptoms (please be specific):  na  How long has patient had these symptoms:  elizabeth  Pharmacy name and phone #:  [facility]  Best Call Back Number:   Additional Information: Calling to speak with the Nurse about her labs and where she wants Dr Cuello to send her orders to. She just had her blood work done today. Please advise.

## 2018-07-31 NOTE — TELEPHONE ENCOUNTER
----- Message from Angie Nevarez sent at 7/31/2018  3:49 PM CDT -----  Type:  Patient Returning Call    Who Called:  Patient  Who Left Message for Patient:  Mitali Evans  Does the patient know what this is regarding?:  NA  Best Call Back Number:  [phone]  Additional Information:

## 2018-08-01 NOTE — TELEPHONE ENCOUNTER
Spoke w/ Denny. He states they cannot add tests to bloodwork already drawn that was initially orders by another provider, and also because we do not have an acct w/ their lab facility.  Spoke w/ lupis and advised. She is seeing Gyn next Wed and is going to get a list of labs that they ordered. She will call back to let us know so that we do not duplicate any labs. We can then make arrangements to get needed labs done.

## 2018-08-28 ENCOUNTER — TELEPHONE (OUTPATIENT)
Dept: FAMILY MEDICINE | Facility: CLINIC | Age: 53
End: 2018-08-28

## 2018-08-28 NOTE — TELEPHONE ENCOUNTER
----- Message from Lesli Alvarez sent at 8/28/2018 11:01 AM CDT -----  Type:  Test Results    Who Called:  Pt Name of Test (Lab/Mammo/Etc):   Home  Kit  Colonoscopy  Test Date of Test: about  A  Month ago Ordering Provider: dr Cuello Where the test was performed: [facility]  Best Call Back number [phone]

## 2018-09-05 ENCOUNTER — TELEPHONE (OUTPATIENT)
Dept: FAMILY MEDICINE | Facility: CLINIC | Age: 53
End: 2018-09-05

## 2018-09-05 NOTE — TELEPHONE ENCOUNTER
----- Message from RT Dayana sent at 9/5/2018  8:04 AM CDT -----  Contact: pt    pt , requesting an appt to be worked today for severe Neck and Shoulder Pain, thanks.

## 2018-09-05 NOTE — TELEPHONE ENCOUNTER
Returned pt call. Pt states she has been having necka nd shoudler pain for 1 week.  She has been to the chiropractor and it did not help.  Appt scheduled 09/06/2018 at 1:00pm. Pt verbalized understandnig.

## 2018-09-06 ENCOUNTER — OFFICE VISIT (OUTPATIENT)
Dept: FAMILY MEDICINE | Facility: CLINIC | Age: 53
End: 2018-09-06
Payer: COMMERCIAL

## 2018-09-06 ENCOUNTER — HOSPITAL ENCOUNTER (OUTPATIENT)
Dept: RADIOLOGY | Facility: HOSPITAL | Age: 53
Discharge: HOME | End: 2018-09-06
Attending: FAMILY MEDICINE
Payer: COMMERCIAL

## 2018-09-06 VITALS
HEIGHT: 66 IN | WEIGHT: 177.5 LBS | SYSTOLIC BLOOD PRESSURE: 118 MMHG | HEART RATE: 84 BPM | TEMPERATURE: 99 F | BODY MASS INDEX: 28.53 KG/M2 | DIASTOLIC BLOOD PRESSURE: 66 MMHG

## 2018-09-06 DIAGNOSIS — M54.12 CERVICAL RADICULOPATHY: Primary | ICD-10-CM

## 2018-09-06 DIAGNOSIS — M54.12 CERVICAL RADICULOPATHY: ICD-10-CM

## 2018-09-06 DIAGNOSIS — K64.9 HEMORRHOIDS, UNSPECIFIED HEMORRHOID TYPE: ICD-10-CM

## 2018-09-06 DIAGNOSIS — Z00.00 ROUTINE GENERAL MEDICAL EXAMINATION AT A HEALTH CARE FACILITY: ICD-10-CM

## 2018-09-06 PROCEDURE — 99999 PR PBB SHADOW E&M-EST. PATIENT-LVL III: CPT | Mod: PBBFAC,,, | Performed by: FAMILY MEDICINE

## 2018-09-06 PROCEDURE — 72040 X-RAY EXAM NECK SPINE 2-3 VW: CPT | Mod: 26,,, | Performed by: RADIOLOGY

## 2018-09-06 PROCEDURE — 96372 THER/PROPH/DIAG INJ SC/IM: CPT | Mod: S$GLB,,, | Performed by: FAMILY MEDICINE

## 2018-09-06 PROCEDURE — 72040 X-RAY EXAM NECK SPINE 2-3 VW: CPT | Mod: TC,PN

## 2018-09-06 PROCEDURE — 3008F BODY MASS INDEX DOCD: CPT | Mod: CPTII,S$GLB,, | Performed by: FAMILY MEDICINE

## 2018-09-06 PROCEDURE — 99214 OFFICE O/P EST MOD 30 MIN: CPT | Mod: 25,S$GLB,, | Performed by: FAMILY MEDICINE

## 2018-09-06 RX ORDER — MELOXICAM 7.5 MG/1
7.5 TABLET ORAL 2 TIMES DAILY
Qty: 60 TABLET | Refills: 0 | Status: SHIPPED | OUTPATIENT
Start: 2018-09-06 | End: 2018-10-16 | Stop reason: SDUPTHER

## 2018-09-06 RX ORDER — HYDROCORTISONE 25 MG/G
CREAM TOPICAL
Qty: 30 G | Refills: 0 | Status: SHIPPED | OUTPATIENT
Start: 2018-09-06 | End: 2020-06-17

## 2018-09-06 RX ORDER — CHOLECALCIFEROL (VITAMIN D3) 25 MCG
1000 TABLET ORAL DAILY
COMMUNITY

## 2018-09-06 RX ORDER — METHOCARBAMOL 500 MG/1
500 TABLET, FILM COATED ORAL 3 TIMES DAILY
Qty: 30 TABLET | Refills: 0 | Status: SHIPPED | OUTPATIENT
Start: 2018-09-06 | End: 2018-09-16

## 2018-09-06 RX ORDER — HYDROCORTISONE ACETATE 25 MG/1
25 SUPPOSITORY RECTAL 2 TIMES DAILY
Qty: 20 SUPPOSITORY | Refills: 1 | Status: SHIPPED | OUTPATIENT
Start: 2018-09-06 | End: 2020-06-17 | Stop reason: SDUPTHER

## 2018-09-06 RX ORDER — GABAPENTIN 300 MG/1
300 CAPSULE ORAL NIGHTLY
Qty: 30 CAPSULE | Refills: 1 | Status: SHIPPED | OUTPATIENT
Start: 2018-09-06 | End: 2018-10-16 | Stop reason: SDUPTHER

## 2018-09-06 RX ORDER — CHOLECALCIFEROL (VITAMIN D3) 125 MCG
1 CAPSULE ORAL DAILY
COMMUNITY

## 2018-09-06 RX ORDER — KETOROLAC TROMETHAMINE 30 MG/ML
30 INJECTION, SOLUTION INTRAMUSCULAR; INTRAVENOUS ONCE
Status: COMPLETED | OUTPATIENT
Start: 2018-09-06 | End: 2018-09-06

## 2018-09-06 RX ADMIN — KETOROLAC TROMETHAMINE 30 MG: 30 INJECTION, SOLUTION INTRAMUSCULAR; INTRAVENOUS at 02:09

## 2018-09-06 NOTE — PROGRESS NOTES
Subjective:       Patient ID: Georgia Gomez is a 53 y.o. female.    Chief Complaint: Neck Pain (right arm pain since last Sunday. No recollection of injury. ) and Shoulder Pain    HPI  Patient with c/o R posterior shoulder neck pain x 1 week. Increasing for several days. She did go to chiropractor x 3 times without relief.   Radiates down the R arm to the wrist.  Taking ibuprofen 800 several times/day. Tried flexeril without relief.   She's also been icing it without relief.    Labs from gyn/silva reviewed.  Reports issues with hemorrhoids. Needs cream/supp refill.    Review of Systems   Constitutional: Negative for fatigue and fever.   HENT: Negative for congestion and postnasal drip.    Respiratory: Negative for cough, chest tightness, shortness of breath and wheezing.    Cardiovascular: Negative for chest pain, palpitations and leg swelling.   Gastrointestinal: Negative for abdominal pain and nausea.   Musculoskeletal: Negative for arthralgias.   Neurological: Positive for weakness and numbness.       Objective:      Physical Exam   Constitutional: She is oriented to person, place, and time. She appears well-developed and well-nourished. No distress.   HENT:   Head: Normocephalic and atraumatic.   Eyes: Conjunctivae are normal. Right eye exhibits no discharge. Left eye exhibits no discharge. No scleral icterus.   Neck: Normal range of motion. Neck supple.   Cardiovascular: Normal rate.   Pulmonary/Chest: Effort normal. No respiratory distress.   Abdominal: Soft. She exhibits no distension.   Musculoskeletal: Normal range of motion. She exhibits no edema.        Back:    Neurological: She is alert and oriented to person, place, and time. No cranial nerve deficit.   RUE weakness at shoulder, elbow, 3-4/5. Sensation intact. No swelling.   Skin: Skin is warm and dry. No rash noted.   Psychiatric: She has a normal mood and affect. Her behavior is normal.   Nursing note and vitals reviewed.        Cervical  radiculopathy  -     X-Ray Cervical Spine AP And Lateral; Future; Expected date: 09/06/2018  Update imaging. toradol in clinic.  meloxicam 7.5mg bid, in lieu of otc nsaids.  Robaxin tid prn.  Gabapentin at bedtime. Side effects and precautions of medication use reviewed with patient, expressed understanding. No questions or concerns.   Hemorrhoids, unspecified hemorrhoid type  Cream/supp prn. Consider f/u with gen surg/pruett.  Routine general medical examination at a health care facility  -     Vitamin D; Future; Expected date: 09/06/2018  -     TSH; Future; Expected date: 09/06/2018  -     Hemoglobin A1c; Future; Expected date: 09/06/2018  -     Comprehensive metabolic panel; Future; Expected date: 09/06/2018  For review.  Other orders  -     ketorolac injection 30 mg; Inject 1 mL (30 mg total) into the muscle once.  -     gabapentin (NEURONTIN) 300 MG capsule; Take 1 capsule (300 mg total) by mouth every evening.  Dispense: 30 capsule; Refill: 1  -     meloxicam (MOBIC) 7.5 MG tablet; Take 1 tablet (7.5 mg total) by mouth 2 (two) times daily.  Dispense: 60 tablet; Refill: 0  -     methocarbamol (ROBAXIN) 500 MG Tab; Take 1 tablet (500 mg total) by mouth 3 (three) times daily. for 10 days  Dispense: 30 tablet; Refill: 0  -     hydrocortisone (PROCTOZONE-HC) 2.5 % rectal cream; PLACE RECTALLY TWICE DAILY AS DIRECTED  Dispense: 30 g; Refill: 0  -     hydrocortisone (ANUSOL-HC) 25 mg suppository; Place 1 suppository (25 mg total) rectally 2 (two) times daily. for 10 days  Dispense: 20 suppository; Refill: 1

## 2018-09-07 ENCOUNTER — TELEPHONE (OUTPATIENT)
Dept: ADMINISTRATIVE | Facility: HOSPITAL | Age: 53
End: 2018-09-07

## 2018-10-12 ENCOUNTER — TELEPHONE (OUTPATIENT)
Dept: FAMILY MEDICINE | Facility: CLINIC | Age: 53
End: 2018-10-12

## 2018-10-12 NOTE — TELEPHONE ENCOUNTER
----- Message from Ashlie Ramirez sent at 10/12/2018 11:19 AM CDT -----  Contact: [phone]  Pt is requesting an appt for Monday for pain in neck,shoulder and arm.  Needs something this week and possible injection.    Thank you!

## 2018-10-16 ENCOUNTER — OFFICE VISIT (OUTPATIENT)
Dept: FAMILY MEDICINE | Facility: CLINIC | Age: 53
End: 2018-10-16
Payer: COMMERCIAL

## 2018-10-16 VITALS
DIASTOLIC BLOOD PRESSURE: 76 MMHG | BODY MASS INDEX: 27.33 KG/M2 | OXYGEN SATURATION: 99 % | SYSTOLIC BLOOD PRESSURE: 122 MMHG | TEMPERATURE: 98 F | RESPIRATION RATE: 18 BRPM | WEIGHT: 170.06 LBS | HEART RATE: 82 BPM | HEIGHT: 66 IN

## 2018-10-16 DIAGNOSIS — M54.12 CERVICAL RADICULOPATHY: Primary | ICD-10-CM

## 2018-10-16 DIAGNOSIS — L72.3 SEBACEOUS CYST: ICD-10-CM

## 2018-10-16 DIAGNOSIS — M54.2 NECK PAIN: ICD-10-CM

## 2018-10-16 PROCEDURE — 96372 THER/PROPH/DIAG INJ SC/IM: CPT | Mod: S$GLB,,, | Performed by: FAMILY MEDICINE

## 2018-10-16 PROCEDURE — 3008F BODY MASS INDEX DOCD: CPT | Mod: CPTII,S$GLB,, | Performed by: FAMILY MEDICINE

## 2018-10-16 PROCEDURE — 99214 OFFICE O/P EST MOD 30 MIN: CPT | Mod: 25,S$GLB,, | Performed by: FAMILY MEDICINE

## 2018-10-16 PROCEDURE — 99999 PR PBB SHADOW E&M-EST. PATIENT-LVL V: CPT | Mod: PBBFAC,,, | Performed by: FAMILY MEDICINE

## 2018-10-16 RX ORDER — MELOXICAM 7.5 MG/1
7.5 TABLET ORAL 2 TIMES DAILY PRN
Qty: 60 TABLET | Refills: 1 | Status: SHIPPED | OUTPATIENT
Start: 2018-10-16 | End: 2019-02-01

## 2018-10-16 RX ORDER — GABAPENTIN 300 MG/1
300 CAPSULE ORAL NIGHTLY
Qty: 30 CAPSULE | Refills: 1 | Status: SHIPPED | OUTPATIENT
Start: 2018-10-16 | End: 2019-02-01

## 2018-10-16 RX ORDER — BETAMETHASONE SODIUM PHOSPHATE AND BETAMETHASONE ACETATE 3; 3 MG/ML; MG/ML
6 INJECTION, SUSPENSION INTRA-ARTICULAR; INTRALESIONAL; INTRAMUSCULAR; SOFT TISSUE
Status: COMPLETED | OUTPATIENT
Start: 2018-10-16 | End: 2018-10-16

## 2018-10-16 RX ADMIN — BETAMETHASONE SODIUM PHOSPHATE AND BETAMETHASONE ACETATE 6 MG: 3; 3 INJECTION, SUSPENSION INTRA-ARTICULAR; INTRALESIONAL; INTRAMUSCULAR; SOFT TISSUE at 11:10

## 2018-10-16 NOTE — PROGRESS NOTES
"Subjective:       Patient ID: Georgia Gomez is a 53 y.o. female.    Chief Complaint: Neck Pain    Neck Pain    Associated symptoms include numbness and weakness. Pertinent negatives include no chest pain or fever.     Patient in the office for neck pain. Had improved with prev tx, but for unknown reason/activity, sx recurred.   Has seb cyst in scalp she'd like removed as it's getting bigger.   Labs 2018 rev.    Review of Systems:  Review of Systems   Constitutional: Negative for fatigue and fever.   HENT: Negative for congestion and postnasal drip.    Respiratory: Negative for cough, chest tightness and shortness of breath.    Cardiovascular: Negative for chest pain, palpitations and leg swelling.   Gastrointestinal: Negative for abdominal pain and nausea.   Musculoskeletal: Positive for neck pain. Negative for arthralgias.   Neurological: Positive for weakness and numbness.       Objective:     Vitals:    10/16/18 1109   BP: 122/76   BP Location: Right arm   Patient Position: Sitting   BP Method: X-Large (Manual)   Pulse: 82   Resp: 18   Temp: 97.9 °F (36.6 °C)   TempSrc: Oral   SpO2: 99%   Weight: 77.2 kg (170 lb 1.4 oz)   Height: 5' 6" (1.676 m)          Physical Exam   Constitutional: She is oriented to person, place, and time. She appears well-developed and well-nourished. No distress.   HENT:   Head: Normocephalic and atraumatic.   Eyes: Conjunctivae are normal. Right eye exhibits no discharge. Left eye exhibits no discharge. No scleral icterus.   Neck: Normal range of motion. Neck supple.   Cardiovascular: Normal rate.   Pulmonary/Chest: Effort normal. No respiratory distress.   Abdominal: Soft. She exhibits no distension.   Musculoskeletal: Normal range of motion. She exhibits no edema.        Back:    Neurological: She is alert and oriented to person, place, and time. No cranial nerve deficit.   RUE weakness at shoulder, elbow, 3-4/5. Sensation intact. No swelling.   Skin: Skin is warm and dry. No rash " noted.   Psychiatric: She has a normal mood and affect. Her behavior is normal.   Nursing note and vitals reviewed.        Assessment & Plan:  Cervical radiculopathy  -     Ambulatory referral to Pain Clinic  -     Ambulatory Referral to Physical/Occupational Therapy  Cont meloxicam with gabapentin. nexium otc prn for gi prophy.  Neck pain  -     MRI Cervical Spine Without Contrast; Future; Expected date: 10/16/2018  -     Ambulatory referral to Pain Clinic  -     Ambulatory Referral to Physical/Occupational Therapy  Update imaging and start PT. Recommend pain clinic review.  toradol inj not covered in clinic. Pt opted for steroid inj for sx relief. Side effects and precautions of medication use reviewed with patient, expressed understanding. No questions or concerns.   Sebaceous cyst  -     Ambulatory referral to Dermatology  For excision.  Other orders  -     gabapentin (NEURONTIN) 300 MG capsule; Take 1 capsule (300 mg total) by mouth every evening.  Dispense: 30 capsule; Refill: 1  -     meloxicam (MOBIC) 7.5 MG tablet; Take 1 tablet (7.5 mg total) by mouth 2 (two) times daily as needed.  Dispense: 60 tablet; Refill: 1  -     betamethasone acetate-betamethasone sodium phosphate injection 6 mg; Inject 1 mL (6 mg total) into the muscle one time.

## 2018-10-22 ENCOUNTER — CLINICAL SUPPORT (OUTPATIENT)
Dept: REHABILITATION | Facility: HOSPITAL | Age: 53
End: 2018-10-22
Attending: FAMILY MEDICINE
Payer: COMMERCIAL

## 2018-10-22 DIAGNOSIS — R29.898 DECREASED ROM OF NECK: ICD-10-CM

## 2018-10-22 DIAGNOSIS — R29.898 WEAKNESS OF RIGHT UPPER EXTREMITY: ICD-10-CM

## 2018-10-22 PROCEDURE — 97161 PT EVAL LOW COMPLEX 20 MIN: CPT | Mod: PO | Performed by: PHYSICAL THERAPIST

## 2018-10-22 PROCEDURE — 97112 NEUROMUSCULAR REEDUCATION: CPT | Mod: PO | Performed by: PHYSICAL THERAPIST

## 2018-10-22 NOTE — PATIENT INSTRUCTIONS
RETRACTION / CHIN TUCK    Slowly draw your head back so that your ears line up with your shoulders. Hold 5 sec, repeat 5x every 1-2 hours        SCAPULAR RETRACTIONS    Draw your shoulder blades back and down. Hold 5 sec, repeat 5x every 1-2 hours.

## 2018-10-22 NOTE — PLAN OF CARE
[facility]  Physical Therapy Initial Evaluation    Name: Kwaku Gomez  Clinic Number: [id number]    Therapy Diagnosis:   Encounter Diagnoses   Name Primary?    Decreased ROM of neck     Weakness of right upper extremity      Physician: Grace Cuello MD    Physician Orders: PT Eval and Treat   Medical Diagnosis from Referral: neck pain/ cervical radiculopathy  Evaluation Date: 10/22/2018  Authorization Period Expiration: 12/17/18  Plan of Care Expiration: 12/3/18  Visit # / Visits authorized: 1/ 30    Time In: 8:00 AM  Time Out: 8:45 AM  Total Billable Time: 45 minutes    Precautions: Standard    Subjective   Date of onset: 8/25/18  History of current condition - KWAKU reports: awakened with right scapular pain on 8/25/18 which radiated down lateral right UE with paresthesias in lower arm and hand.  Pt states she was out of town and slept on unsupportive pillow.  Pt tried chiropractic care for three visits which did not help.  Dr. Cuello prescribed Maloxicam and Gabapentin at night.  She also used ice on her shoulder or arm when severe. Pain seemed to resolve on its own for approximately 2 weeks.  Pain returned on 10/16 at which time she saw Dr. Cuello.  She was given an injection and has not had pain since.  She still has some mild paresthesias on dorsum of hand.  When pain was severe, she rodrigo right arm was weak as well.  She had difficulty opening doors or holding purse.  She is worried about doing any physical therapy today because she has not been hurting for 6 days and is fearful that PT will cause pain.       Past Medical History:   Diagnosis Date    Arthritis     Headache(784.0)     Rash      Kwaku Gomez  has a past surgical history that includes Hysterectomy (10/2014).    Kwaku has a current medication list which includes the following prescription(s): biotin, bupropion, testosterone, estradiol 0.05 mg/24 hr td ptsw, gabapentin, hydrocortisone, meloxicam,  multivitamin, and vitamin d.    Review of patient's allergies indicates:  No Known Allergies     Imaging,X rays: Mild to moderate disc space narrowing at C5-C6 and C6-C7 levels    Prior Therapy: chiropractic care  Occupation: /agent  Prior Level of Function: unrestricted lifting, carrying and no difficulty sleeping  Current Level of Function: Currently has resumed normal functional activities, but was having difficulty lifting, carrying and sleeping    Pain:  Current 0/10, worst 10/10, best 0/10   Location: right arm, neck , shoulder  and upper scapula   Description: Tingling, Sharp, Electric and Shooting  Aggravating Factors: Laying and Flexing  Easing Factors: ice and medication    Pts goals: To prevent return of pain right scapula, UE and hand    Objective       Posture: Pt presents with slight forward head, anterior shoulders posturing.    Cervical Range of Motion:    Degrees Pain   Flexion 25 Right scapular pain and increased paresthesias     Extension 40 no     Right Rotation 70 no     Left Rotation 70 no     Right Side Bending 10 no   Left Side Bending 20 no      Shoulder Range of Motion:   Shoulder Left Right   Flexion 180 180   Abduction 180 180   ER 90 90   IR 85 85     Strength:  Cervical MMT   Flexion 5/5   Extension 5/5   Right Side Bend 5/5   Left Side Bend 5/5     Upper Extremity Strength  (R) UE  (L) UE    Shoulder flexion: 4/5 Shoulder flexion: 5/5   Shoulder Abduction: 4/5 Shoulder abduction: 5/5   Shoulder ER 5/5 Shoulder ER 5/5   Shoulder IR 5/5 Shoulder IR 5/5   Elbow flexion: 4-/5 Elbow flexion: 5/5   Elbow extension: 4/5 Elbow extension: 5/5   Wrist flexion: 4+/5 Wrist flexion: 5/5   Wrist extension: 4-/5 Wrist extension: 5/5    4/5 : 5/5   Lower Trap 4+/5 Lower Trap 4+/5   Middle Trap 4+/5 Middle Trap 4+/5   Rhomboids 4+/5 Rhomboids 4+/5         Special Tests:  Distraction Relief of sx   Compression Increase of sx       Palpation: Minimal palpable tightness of right upper trap  and levator scapulae      Sensation: intact      TREATMENT   Treatment Time In: 8:30 AM  Treatment Time Out: 8:45 AM  Total Treatment time separate from Evaluation: 15 minutes    GEORGIA received therapeutic exercises to develop posture for 2 minutes including:  Cervical retraction 5x5sec  Scapula retraction 5x5sec    GEORGIA participated in neuromuscular re-education activities to improve: Posture and body mechanics for 13 minutes. The following activities were included:  Instruction in posture and positioning with sitting, sleep, working at computer and with activities such as conversations, watching TV and reading and writing.  Home Exercises and Patient Education Provided    Education provided:   - Instructed in posture and positioning as above.    Written Home Exercises Provided: yes.  Exercises were reviewed and GEORGIA was able to demonstrate them prior to the end of the session.  GEORGIA demonstrated good  understanding of the education provided.     See EMR under Patient Instructions for exercises provided 10/22/2018.    Assessment   Georgia is a 53 y.o. female referred to outpatient Physical Therapy with a medical diagnosis of cervical radiculopathy. Pt presents with no pain x 6 days since injection by physician.  Pt's sx are reproducible with cervical flexion and compression.  They are decreased with postural correction and distraction.  Caution was taken to limit reproduction of sx.  Time was spent educating pt about her pain origin from her neck.  She feels like it is her upper back/scapula.  Showed pt dermatome/myotome diagrams to help her understand.  Discussed why sleep position may have triggered problem.  She does present with weakness of right shoulder abduction and elbow flexion.  Pt to work on awareness of posture and alignment at computer and with sleep, as well as a few postural exercises.  If sx begin to return, pt to call for appointment and will initiate cervical traction at that  time.    Pt prognosis is Excellent.   Pt will benefit from skilled outpatient Physical Therapy to address the deficits stated above and in the chart below, provide pt/family education, and to maximize pt's level of independence.     Plan of care discussed with patient: Yes  Pt's spiritual, cultural and educational needs considered and patient is agreeable to the plan of care and goals as stated below:     Anticipated Barriers for therapy: none    Medical Necessity is demonstrated by the following  History  Co-morbidities and personal factors that may impact the plan of care Co-morbidities:   arthritis    Personal Factors:   no deficits     low   Examination  Body Structures and Functions, activity limitations and participation restrictions that may impact the plan of care Body Regions:   neck  upper extremities    Body Systems:    gross symmetry  ROM  strength  gross coordinated movement    Participation Restrictions:   none    Activity limitations:   Learning and applying knowledge  no deficits    General Tasks and Commands  no deficits    Communication  no deficits    Mobility  lifting and carrying objects    Self care  no deficits    Domestic Life  no deficits    Interactions/Relationships  no deficits    Life Areas  no deficits    Community and Social Life  no deficits         low   Clinical Presentation stable and uncomplicated low   Decision Making/ Complexity Score: low     Goals:  Short Term Goals: 4 weeks   Pt will have no recurrence of right UE sx with improved posture and correction of sitting at computer and sleep position.  Pt will be able to perform all functional tasks without increase of sx  Full ROM of cervical spine without reproduction of right UE sx  5/5 strength of right UE needed for functional lifting and carrying.    Long Term Goals: 8 weeks   Will assign LTG if pt has recurrence of sx.    Plan   Plan of care Certification: 10/22/2018 to 1/19/18.  Pt to try to control sx with exercise as  instructed at home, correction of posture, body mechanics and sleep position.  If sx should return, PT will include:    Outpatient Physical Therapy 2 times weekly for 8 weeks to include the following interventions: Cervical/Lumbar Traction, Manual Therapy, Moist Heat/ Ice, Neuromuscular Re-ed, Patient Education, Therapeutic Activites, Therapeutic Exercise and Ultrasound.     Zari Matute, PT

## 2018-12-07 ENCOUNTER — TELEPHONE (OUTPATIENT)
Dept: DERMATOLOGY | Facility: CLINIC | Age: 53
End: 2018-12-07

## 2018-12-07 NOTE — TELEPHONE ENCOUNTER
appt schedule in time slot not available,i called pt to reshcedule,pt asked if she could have her procedure done on same day she see MD for consult,i consulted with MD if this could take place MD advised pt have consult first then schedule procedure.MANJIT for pt on 12/7/17 @ 11:37am

## 2018-12-17 ENCOUNTER — DOCUMENTATION ONLY (OUTPATIENT)
Dept: REHABILITATION | Facility: HOSPITAL | Age: 53
End: 2018-12-17

## 2018-12-17 DIAGNOSIS — R29.898 WEAKNESS OF RIGHT UPPER EXTREMITY: ICD-10-CM

## 2018-12-17 DIAGNOSIS — R29.898 DECREASED ROM OF NECK: ICD-10-CM

## 2018-12-17 NOTE — PROGRESS NOTES
Outpatient Therapy Discharge Summary     Name: Georgia Gomez  [facility] Number: [id number]    Therapy Diagnosis:   Encounter Diagnoses   Name Primary?    Decreased ROM of neck     Weakness of right upper extremity         Physician: Grace Cuello MD     Physician Orders: PT Eval and Treat   Medical Diagnosis from Referral: neck pain/ cervical radiculopathy  Evaluation Date: 10/22/2018     Physician: Grace Cuello MD     Physician Orders: PT Eval and Treat   Medical Diagnosis from Referral: neck pain/ cervical radiculopathy  Evaluation Date: 10/22/2018      Date of Last visit: 10/22/18  Total Visits Received: 1      Assessment    Goals:   Short Term Goals: 4 weeks   Pt will have no recurrence of right UE sx with improved posture and correction of sitting at computer and sleep position.  Pt will be able to perform all functional tasks without increase of sx  Full ROM of cervical spine without reproduction of right UE sx  5/5 strength of right UE needed for functional lifting and carrying.     Long Term Goals: 8 weeks   Will assign LTG if pt has recurrence of sx.      Discharge reason: Patient is now asymptomatic    Plan   This patient is discharged from Physical Therapy

## 2019-01-11 ENCOUNTER — OFFICE VISIT (OUTPATIENT)
Dept: DERMATOLOGY | Facility: CLINIC | Age: 54
End: 2019-01-11
Payer: COMMERCIAL

## 2019-01-11 DIAGNOSIS — L81.4 LENTIGINES: ICD-10-CM

## 2019-01-11 DIAGNOSIS — L72.11 PILAR CYSTS: Primary | ICD-10-CM

## 2019-01-11 PROCEDURE — 99999 PR PBB SHADOW E&M-EST. PATIENT-LVL III: ICD-10-PCS | Mod: PBBFAC,,, | Performed by: DERMATOLOGY

## 2019-01-11 PROCEDURE — 99202 OFFICE O/P NEW SF 15 MIN: CPT | Mod: S$GLB,,, | Performed by: DERMATOLOGY

## 2019-01-11 PROCEDURE — 99202 PR OFFICE/OUTPT VISIT, NEW, LEVL II, 15-29 MIN: ICD-10-PCS | Mod: S$GLB,,, | Performed by: DERMATOLOGY

## 2019-01-11 PROCEDURE — 99999 PR PBB SHADOW E&M-EST. PATIENT-LVL III: CPT | Mod: PBBFAC,,, | Performed by: DERMATOLOGY

## 2019-01-11 NOTE — PATIENT INSTRUCTIONS
One week before surgery:  Unless a doctor has prescribed them, stop aspirin and ibuprofen-containing or similar medicines (Advil, Motrin, Aleve, Orudis, Naproxen, Ketoprofen, etc.).  These medicines can cause an increase in bleeding during and after surgery and should be avoided for the week prior to surgery UNLESS a physician has instructed you to take these.  If a physician instructed you to take these, please continue, knowing that you may have a slightly higher risk of bleeding during the procedure.  It would also be helpful to discontinue vitamin or herbal supplements, as many of these (including Vitamin E, omega 3 fatty acids, and others) can also thin your blood and lead to more bleeding.  Alcoholic beverages can have the same effect.   You should continue all prescribed medications. If you are on Plavix or Coumadin, or other prescription blood-thinners, please make sure we are aware.    Antibiotics:  If you were prescribed pre-op antibiotics, please start them as directed. If you believe you need pre-op antibiotics and were not prescribed them, please call us at [phone].    The day of surgery:   If possible, bring someone with you.   Please arrive 15 minutes before your scheduled appointment time. We strive to stay on schedule. However, surgical procedures can sometimes take longer than expected, so you may wish to bring something to read if we are running late.   Wear comfortable clothing.   Eat normally the day of your surgery. You do not need to fast.   Take your regular medications unless directed otherwise.   Expect to have a large pressure dressing placed over your surgical site. This should remain in place for 24-48 hours. If your surgery involves the scalp it is possible that you may have a dressing wrapped around your entire head (to achieve pressure to the surgical wound).   If possible, take the remainder of the day off work to minimize post-operative bleeding.  Following your  procedure (while you have stitches in place, usually 2 weeks):   Avoid activities that may place strain on your incision. This is to help prevent bleeding, pulling of sutures, and poor wound healing. If you are in doubt about what is acceptable, please call us before your appointment or clarify with the doctor before you leave.   Use an ice pack to help prevent swelling and discomfort.  You may also take acetaminophen (Tylenol) if you wish.   You will be given written instructions on how to care for your incision.   No repeated lifting or bending.   No activity that raises your heart rate such as exercise, excessive stair climbing, lawn mowing, leaf raking, exercise walks, etc.   For surgery on the head and neck, no activity that repetitively puts your head below heart level such as golfing, bowling, gardening, etc.   No swimming while stitches are in place (usually 2 weeks).    Wound care supplies you may wish to purchase before your procedure:   Acetaminophen (Tylenol) ·   Vaseline petroleum jelly   Q-tips   Ice pack   Non-stick bandages (for example, Telfa)   Bandage tape   These items can be purchased at any store that stocks medical supplies (e.g., [employer], [employer], [employer]      We hope that this information helps you to be well-prepared for your surgery. We look forward to seeing you in the near future. Thank you!

## 2019-01-11 NOTE — PROGRESS NOTES
Subjective:       Patient ID:  Georgia Gomez is a 53 y.o. female who presents for   Chief Complaint   Patient presents with    Cyst     Scalp     52 y/o F initial visit for cyst on scalp x 1 year. Denies any symptoms. Denies drainage. Increasing in size. No tx.    She has had previous pilar cysts removed.     Denies any history of skin cancer .Pt denies family history of melanoma.     .Past Medical History:  No date: Arthritis  No date: Headache(784.0)  No date: Rash          Review of Systems   Constitutional: Negative for fever, chills and fatigue.   Skin: Positive for activity-related sunscreen use (only at beach). Negative for itching, rash, dry skin, daily sunscreen use and wears hat.        Objective:    Physical Exam   Constitutional: She appears well-developed and well-nourished. No distress.   Neurological: She is alert and oriented to person, place, and time. She is not disoriented.   Psychiatric: She has a normal mood and affect.   Skin:   Areas Examined (abnormalities noted in diagram):   Scalp / Hair Palpated and Inspected  Head / Face Inspection Performed              Diagram Legend     Erythematous scaling macule/papule c/w actinic keratosis       Vascular papule c/w angioma      Pigmented verrucoid papule/plaque c/w seborrheic keratosis      Yellow umbilicated papule c/w sebaceous hyperplasia      Irregularly shaped tan macule c/w lentigo     1-2 mm smooth white papules consistent with Milia      Movable subcutaneous cyst with punctum c/w epidermal inclusion cyst      Subcutaneous movable cyst c/w pilar cyst      Firm pink to brown papule c/w dermatofibroma      Pedunculated fleshy papule(s) c/w skin tag(s)      Evenly pigmented macule c/w junctional nevus     Mildly variegated pigmented, slightly irregular-bordered macule c/w mildly atypical nevus      Flesh colored to evenly pigmented papule c/w intradermal nevus       Pink pearly papule/plaque c/w basal cell carcinoma      Erythematous  hyperkeratotic cursted plaque c/w SCC      Surgical scar with no sign of skin cancer recurrence      Open and closed comedones      Inflammatory papules and pustules      Verrucoid papule consistent consistent with wart     Erythematous eczematous patches and plaques     Dystrophic onycholytic nail with subungual debris c/w onychomycosis     Umbilicated papule    Erythematous-base heme-crusted tan verrucoid plaque consistent with inflamed seborrheic keratosis     Erythematous Silvery Scaling Plaque c/w Psoriasis     See annotation      Assessment / Plan:        Pilar cysts x 3  - The benign nature of these lesions was discussed with the patient and that no treatment is indicated today.   - Discussed that when not inflamed, definite treatment includes surgical excision.  - Patient desires surgical removal. Will schedule 2 separate procedure slots.     Lentigines  These are benign hyperpigmented sun induced lesions. Daily sun protection will reduce the number of new lesions. Treatment of these benign lesions are considered cosmetic.               Follow-up for will schedule procedure for cyst removal.

## 2019-01-30 ENCOUNTER — TELEPHONE (OUTPATIENT)
Dept: FAMILY MEDICINE | Facility: CLINIC | Age: 54
End: 2019-01-30

## 2019-01-30 ENCOUNTER — PROCEDURE VISIT (OUTPATIENT)
Dept: DERMATOLOGY | Facility: CLINIC | Age: 54
End: 2019-01-30
Payer: COMMERCIAL

## 2019-01-30 VITALS — WEIGHT: 170 LBS | BODY MASS INDEX: 27.32 KG/M2 | HEIGHT: 66 IN | RESPIRATION RATE: 16 BRPM

## 2019-01-30 DIAGNOSIS — L72.11 PILAR CYSTS: Primary | ICD-10-CM

## 2019-01-30 DIAGNOSIS — M67.449 DIGITAL MUCINOUS CYST: ICD-10-CM

## 2019-01-30 PROCEDURE — 88304 TISSUE EXAM BY PATHOLOGIST: CPT | Performed by: PATHOLOGY

## 2019-01-30 PROCEDURE — 88304 TISSUE SPECIMEN TO PATHOLOGY, DERMATOLOGY: ICD-10-PCS | Mod: 26,,, | Performed by: PATHOLOGY

## 2019-01-30 PROCEDURE — 11422 PR EXC SKIN BENIG 1.1-2 CM REMAINDR BODY: ICD-10-PCS | Mod: S$GLB,,, | Performed by: DERMATOLOGY

## 2019-01-30 PROCEDURE — 99499 UNLISTED E&M SERVICE: CPT | Mod: S$GLB,,, | Performed by: DERMATOLOGY

## 2019-01-30 PROCEDURE — 99499 NO LOS: ICD-10-PCS | Mod: S$GLB,,, | Performed by: DERMATOLOGY

## 2019-01-30 PROCEDURE — 11422 EXC H-F-NK-SP B9+MARG 1.1-2: CPT | Mod: S$GLB,,, | Performed by: DERMATOLOGY

## 2019-01-30 NOTE — PROGRESS NOTES
PREOPERATIVE DIAGNOSIS: Pilar cyst  LOCATION: scalp    POSTOPERATIVE DIAGNOSIS: Same.    OPERATION PERFORMED: Excision with simple repair.    SURGEON(S):   Aida Taveras MD    ANESTHESIA: Local.    INDICATIONS FOR PROCEDURE: This patient presents with the lesion noted above.  Excision with minimal margins with simple repair is indicated. Risks of pain, bleeding, scarring, infection, and wound dehiscence have been discussed and written informed consent obtained.    A timeout was completed, verifying correct patient, procedure, supplies, site, positioning, and implant(s) or special equipment.    PROCEDURE AND FINDINGS: The patient was placed on the operating room table. The lesion site was outlined and shown to the patient, who agreed that this was the correct site. The area was anesthetized with 1% lidocaine with epinephrine, washed with Exidine, rinsed with saline and draped with sterile towels. The lesion measuring 1.7 cm in greatest diameter was excised with minimal margins to the underlying subcutis. Meticulous hemostasis was obtained. Epidermal closure was performed using stables. The final wound length was 1.9 cm. A pressure dressing was applied. A wound care and pain management handout with emergency phone numbers was discussed and given to the patient. The patient was discharged ambulatory with stable vital signs.    ESTIMATED BLOOD LOSS: Minimal    COMPLICATIONS: None.      She would also like me to evaluate a lesion on her left 2nd digit x few months. Denies drainage. States lesion started after her arthritis became worse.     PE: left 2nd DIP with translucent papule. No thrombosed vessels noted    A/P:  Digital mucous cyst on the left 2nd DIP  - I discussed that this lesion may be a form of focal mucinosis, which is characterized by deposits of mucopolysaccharides (mucins) in the skin, or arises from extension of the lining of the finger joint.  - This lesion is considered a pseudocyst as it does  not contain a surrounding capsule.  - If desired, lesion could be excised by Plastic Surgery (Hand Surgery).   - Treatment is not indicated today

## 2019-01-30 NOTE — PROGRESS NOTES
Subjective:       Patient ID:  Georgia Gomez is a 53 y.o. female who presents for   Chief Complaint   Patient presents with    Follow-up     pilar cyst excision     Last seen  1-11-19     Presents today for excision of Pilar cyst  54 y/o F w/ cyst on scalp x 1 year. Denies any symptoms. Denies drainage. Increasing in size. No tx.     She has had previous pilar cysts removed.      Denies any history of skin cancer .Pt denies family history of melanoma.      Past Medical History:  No date: Arthritis  No date: Headache(784.0)  No date: Rash              Review of Systems   Constitutional: Negative for fever, chills, fatigue and malaise.   Skin: Positive for activity-related sunscreen use (only at beach). Negative for itching, rash, dry skin, daily sunscreen use and wears hat.   Hematologic/Lymphatic: Does not bruise/bleed easily.        Objective:    Physical Exam       Diagram Legend     Erythematous scaling macule/papule c/w actinic keratosis       Vascular papule c/w angioma      Pigmented verrucoid papule/plaque c/w seborrheic keratosis      Yellow umbilicated papule c/w sebaceous hyperplasia      Irregularly shaped tan macule c/w lentigo     1-2 mm smooth white papules consistent with Milia      Movable subcutaneous cyst with punctum c/w epidermal inclusion cyst      Subcutaneous movable cyst c/w pilar cyst      Firm pink to brown papule c/w dermatofibroma      Pedunculated fleshy papule(s) c/w skin tag(s)      Evenly pigmented macule c/w junctional nevus     Mildly variegated pigmented, slightly irregular-bordered macule c/w mildly atypical nevus      Flesh colored to evenly pigmented papule c/w intradermal nevus       Pink pearly papule/plaque c/w basal cell carcinoma      Erythematous hyperkeratotic cursted plaque c/w SCC      Surgical scar with no sign of skin cancer recurrence      Open and closed comedones      Inflammatory papules and pustules      Verrucoid papule consistent consistent with wart      Erythematous eczematous patches and plaques     Dystrophic onycholytic nail with subungual debris c/w onychomycosis     Umbilicated papule    Erythematous-base heme-crusted tan verrucoid plaque consistent with inflamed seborrheic keratosis     Erythematous Silvery Scaling Plaque c/w Psoriasis     See annotation      Assessment / Plan:        Pilar cysts             Follow-up for 10-14 days for stable removal.

## 2019-01-30 NOTE — TELEPHONE ENCOUNTER
----- Message from Roxana Ryan sent at 1/30/2019 10:00 AM CST -----  Type:  Sooner Apoointment Request    Caller is requesting a sooner appointment.  Caller declined first available appointment listed below.  Caller will not accept being placed on the waitlist and is requesting a message be sent to doctor.    Name of Caller:  Georgia  When is the first available appointment?  2/11  Symptoms:  Hip pain and anxiety  Best Call Back Number:  [phone]  Additional Information:  Thank you!

## 2019-02-01 ENCOUNTER — OFFICE VISIT (OUTPATIENT)
Dept: FAMILY MEDICINE | Facility: CLINIC | Age: 54
End: 2019-02-01
Payer: COMMERCIAL

## 2019-02-01 VITALS
WEIGHT: 158.75 LBS | DIASTOLIC BLOOD PRESSURE: 74 MMHG | HEART RATE: 79 BPM | SYSTOLIC BLOOD PRESSURE: 118 MMHG | BODY MASS INDEX: 25.51 KG/M2 | HEIGHT: 66 IN | TEMPERATURE: 98 F | OXYGEN SATURATION: 98 % | RESPIRATION RATE: 18 BRPM

## 2019-02-01 DIAGNOSIS — M54.32 SCIATIC NOTCH PAIN, LEFT: Primary | ICD-10-CM

## 2019-02-01 DIAGNOSIS — F32.4 MAJOR DEPRESSIVE DISORDER WITH SINGLE EPISODE, IN PARTIAL REMISSION: ICD-10-CM

## 2019-02-01 DIAGNOSIS — R00.2 PALPITATIONS: ICD-10-CM

## 2019-02-01 PROCEDURE — 99999 PR PBB SHADOW E&M-EST. PATIENT-LVL IV: ICD-10-PCS | Mod: PBBFAC,,, | Performed by: FAMILY MEDICINE

## 2019-02-01 PROCEDURE — 99999 PR PBB SHADOW E&M-EST. PATIENT-LVL IV: CPT | Mod: PBBFAC,,, | Performed by: FAMILY MEDICINE

## 2019-02-01 PROCEDURE — 99214 OFFICE O/P EST MOD 30 MIN: CPT | Mod: S$GLB,,, | Performed by: FAMILY MEDICINE

## 2019-02-01 PROCEDURE — 99214 PR OFFICE/OUTPT VISIT, EST, LEVL IV, 30-39 MIN: ICD-10-PCS | Mod: S$GLB,,, | Performed by: FAMILY MEDICINE

## 2019-02-01 NOTE — PATIENT INSTRUCTIONS
meloxicam 15mg once daily  Use topicals such as icy hot and heating pads (20mins, medium)  Cyclobenzaprine 5-10mg up to every 8 hours as needed. *will cause drowsiness*    Decrease wellbutrin to 300mg daily (1.5 tablets)

## 2019-02-01 NOTE — PROGRESS NOTES
"Subjective:       Patient ID: Georgia Gomez is a 53 y.o. female.    Chief Complaint: Hip Pain (left hip, pain started a few weeks ago)    HPI  Patient with c/o L hip pain a starting few weeks ago. No precipitating factors. Similar to previous R sciatica. Well localized. No exacerbating activities.   Uses tylenol prn.   Has noticed occasions of heart racing. 1 episode woke her up at night. Daily/near-daily. More common in the middle of the day. Occurs at rest. Notes no increase in stress, no concerns for increase in depression/anxiety.  Working on weight loss for health.      Review of Systems:  Review of Systems   Constitutional: Negative for activity change, fatigue and unexpected weight change.   HENT: Negative for hearing loss, rhinorrhea and trouble swallowing.    Eyes: Negative for discharge and visual disturbance.   Respiratory: Negative for chest tightness and wheezing.    Cardiovascular: Positive for palpitations. Negative for chest pain.   Gastrointestinal: Negative for blood in stool, constipation, diarrhea and vomiting.   Endocrine: Negative for polydipsia and polyuria.   Genitourinary: Negative for difficulty urinating, dysuria, hematuria and menstrual problem.   Musculoskeletal: Positive for arthralgias. Negative for joint swelling and neck pain.   Neurological: Negative for weakness and headaches.   Psychiatric/Behavioral: Negative for confusion and dysphoric mood. The patient is not nervous/anxious.        Objective:     Vitals:    02/01/19 0713   BP: 118/74   BP Location: Right arm   Patient Position: Sitting   BP Method: Large (Manual)   Pulse: 79   Resp: 18   Temp: 98.1 °F (36.7 °C)   TempSrc: Oral   SpO2: 98%   Weight: 72 kg (158 lb 11.7 oz)   Height: 5' 6" (1.676 m)         Physical Exam   Constitutional: She is oriented to person, place, and time. She appears well-developed and well-nourished. No distress.   HENT:   Head: Normocephalic and atraumatic.   Eyes: Conjunctivae are normal. Right eye " exhibits no discharge. Left eye exhibits no discharge. No scleral icterus.   Neck: Normal range of motion. Neck supple.   Cardiovascular: Normal rate and regular rhythm.   Pulmonary/Chest: Effort normal and breath sounds normal. No respiratory distress.   Abdominal: Soft. She exhibits no distension.   Musculoskeletal: Normal range of motion. She exhibits tenderness (L sciatic notch). She exhibits no edema.   Neurological: She is alert and oriented to person, place, and time. No cranial nerve deficit.   Skin: Skin is warm and dry. No rash noted.   Psychiatric: She has a normal mood and affect. Her behavior is normal.   Nursing note and vitals reviewed.        Assessment & Plan:  Sciatic notch pain, left  Pt has meloxicam and flexeril at home. Reviewed use. Hold nsaids until head wound healed (derm). Stretches printed. Let me know if continuing for PT orders.  Major depressive disorder with single episode, in partial remission  Palpitations may be from stimulant response on snri. Decrease dose to 300mg daily.  Palpitations  -     Holter monitor - 48 hour (Cupid Only); Future  -     TSH; Future; Expected date: 02/01/2019  -     Comprehensive metabolic panel; Future; Expected date: 02/01/2019  If palp cont after wellbutrin adj, schedule holter for review.

## 2019-02-07 ENCOUNTER — TELEPHONE (OUTPATIENT)
Dept: DERMATOLOGY | Facility: CLINIC | Age: 54
End: 2019-02-07

## 2019-02-07 NOTE — TELEPHONE ENCOUNTER
Pt made aware she can have staples removed during procedure visit on Feb 15.   ----- Message from Princess Morton sent at 2/6/2019  5:13 PM CST -----  Contact: Pt   Pt is requesting a call back in regards to regards to rescheduling her procedure. Pt would like to know if she can have her staples removed during her procedure.       Pt can be contacted at [phone]

## 2019-02-15 ENCOUNTER — CLINICAL SUPPORT (OUTPATIENT)
Dept: DERMATOLOGY | Facility: CLINIC | Age: 54
End: 2019-02-15
Payer: COMMERCIAL

## 2019-02-15 DIAGNOSIS — Z48.02 VISIT FOR SUTURE REMOVAL: Primary | ICD-10-CM

## 2019-02-15 PROCEDURE — 99024 POSTOP FOLLOW-UP VISIT: CPT | Mod: S$GLB,,, | Performed by: DERMATOLOGY

## 2019-02-15 PROCEDURE — 99024 PR POST-OP FOLLOW-UP VISIT: ICD-10-PCS | Mod: S$GLB,,, | Performed by: DERMATOLOGY

## 2019-02-15 NOTE — PROGRESS NOTES
Subjective:       Patient ID:  Georgia Gomez is a 53 y.o. female who presents for No chief complaint on file.    .Patient presents for suture removal. The wound is well healed without signs of infection.  The sutures are removed. Wound care and activity instructions given. Return prn.          Review of Systems     Objective:    Physical Exam       Diagram Legend     Erythematous scaling macule/papule c/w actinic keratosis       Vascular papule c/w angioma      Pigmented verrucoid papule/plaque c/w seborrheic keratosis      Yellow umbilicated papule c/w sebaceous hyperplasia      Irregularly shaped tan macule c/w lentigo     1-2 mm smooth white papules consistent with Milia      Movable subcutaneous cyst with punctum c/w epidermal inclusion cyst      Subcutaneous movable cyst c/w pilar cyst      Firm pink to brown papule c/w dermatofibroma      Pedunculated fleshy papule(s) c/w skin tag(s)      Evenly pigmented macule c/w junctional nevus     Mildly variegated pigmented, slightly irregular-bordered macule c/w mildly atypical nevus      Flesh colored to evenly pigmented papule c/w intradermal nevus       Pink pearly papule/plaque c/w basal cell carcinoma      Erythematous hyperkeratotic cursted plaque c/w SCC      Surgical scar with no sign of skin cancer recurrence      Open and closed comedones      Inflammatory papules and pustules      Verrucoid papule consistent consistent with wart     Erythematous eczematous patches and plaques     Dystrophic onycholytic nail with subungual debris c/w onychomycosis     Umbilicated papule    Erythematous-base heme-crusted tan verrucoid plaque consistent with inflamed seborrheic keratosis     Erythematous Silvery Scaling Plaque c/w Psoriasis     See annotation      Assessment / Plan:        There are no diagnoses linked to this encounter.         No Follow-up on file.

## 2019-03-15 ENCOUNTER — PROCEDURE VISIT (OUTPATIENT)
Dept: DERMATOLOGY | Facility: CLINIC | Age: 54
End: 2019-03-15
Payer: COMMERCIAL

## 2019-03-15 DIAGNOSIS — M67.449 DIGITAL MUCINOUS CYST: ICD-10-CM

## 2019-03-15 DIAGNOSIS — L72.11 PILAR CYST: Primary | ICD-10-CM

## 2019-03-15 PROCEDURE — 11422 EXC H-F-NK-SP B9+MARG 1.1-2: CPT | Mod: S$GLB,,, | Performed by: DERMATOLOGY

## 2019-03-15 PROCEDURE — 88304 TISSUE EXAM BY PATHOLOGIST: CPT | Performed by: PATHOLOGY

## 2019-03-15 PROCEDURE — 99499 UNLISTED E&M SERVICE: CPT | Mod: S$GLB,,, | Performed by: DERMATOLOGY

## 2019-03-15 PROCEDURE — 11422 PR EXC SKIN BENIG 1.1-2 CM REMAINDR BODY: ICD-10-PCS | Mod: S$GLB,,, | Performed by: DERMATOLOGY

## 2019-03-15 PROCEDURE — 88304 TISSUE SPECIMEN TO PATHOLOGY, DERMATOLOGY: ICD-10-PCS | Mod: 26,,, | Performed by: PATHOLOGY

## 2019-03-15 PROCEDURE — 99499 NO LOS: ICD-10-PCS | Mod: S$GLB,,, | Performed by: DERMATOLOGY

## 2019-03-15 NOTE — PROGRESS NOTES
PREOPERATIVE DIAGNOSIS: EIC  LOCATION: right parietal scalp    POSTOPERATIVE DIAGNOSIS: Same.    OPERATION PERFORMED: Excision with simple repair.    SURGEON(S):   Aida Taveras MD    ANESTHESIA: Local.    INDICATIONS FOR PROCEDURE: This patient presents with the lesion noted above. History of EIC.  Excision with no margins with simple repair is indicated. Risks of pain, bleeding, scarring, infection, and wound dehiscence have been discussed and written informed consent obtained.    A timeout was completed, verifying correct patient, procedure, supplies, site, positioning, and implant(s) or special equipment.    PROCEDURE AND FINDINGS: The patient was placed on the operating room table. The lesion site was outlined and shown to the patient, who agreed that this was the correct site. The area was anesthetized with 1% lidocaine with epinephrine, washed with Exidine, rinsed with saline and draped with sterile towels. The lesion measuring 1.5 cm in greatest diameter was excised with no margins to the underlying subcutaneous tissue. The wound edges were extensively undermined. Meticulous hemostasis was obtained. Subcutaneous, dermal and epidermal closure was performed using staples. The final wound length was 1.5 cm. A pressure dressing was applied. A wound care and pain management handout with emergency phone numbers was discussed and given to the patient. The patient was discharged ambulatory with stable vital signs.    ESTIMATED BLOOD LOSS: Minimal    COMPLICATIONS: None.

## 2019-03-15 NOTE — PROGRESS NOTES
Subjective:       Patient ID:  Georgia Gomez is a 53 y.o. female who presents for   Chief Complaint   Patient presents with    Cyst     HPI    Review of Systems     Objective:    Physical Exam       Diagram Legend     Erythematous scaling macule/papule c/w actinic keratosis       Vascular papule c/w angioma      Pigmented verrucoid papule/plaque c/w seborrheic keratosis      Yellow umbilicated papule c/w sebaceous hyperplasia      Irregularly shaped tan macule c/w lentigo     1-2 mm smooth white papules consistent with Milia      Movable subcutaneous cyst with punctum c/w epidermal inclusion cyst      Subcutaneous movable cyst c/w pilar cyst      Firm pink to brown papule c/w dermatofibroma      Pedunculated fleshy papule(s) c/w skin tag(s)      Evenly pigmented macule c/w junctional nevus     Mildly variegated pigmented, slightly irregular-bordered macule c/w mildly atypical nevus      Flesh colored to evenly pigmented papule c/w intradermal nevus       Pink pearly papule/plaque c/w basal cell carcinoma      Erythematous hyperkeratotic cursted plaque c/w SCC      Surgical scar with no sign of skin cancer recurrence      Open and closed comedones      Inflammatory papules and pustules      Verrucoid papule consistent consistent with wart     Erythematous eczematous patches and plaques     Dystrophic onycholytic nail with subungual debris c/w onychomycosis     Umbilicated papule    Erythematous-base heme-crusted tan verrucoid plaque consistent with inflamed seborrheic keratosis     Erythematous Silvery Scaling Plaque c/w Psoriasis     See annotation      Assessment / Plan:        There are no diagnoses linked to this encounter.         No Follow-up on file.

## 2019-03-18 ENCOUNTER — TELEPHONE (OUTPATIENT)
Dept: ORTHOPEDICS | Facility: CLINIC | Age: 54
End: 2019-03-18

## 2019-03-29 ENCOUNTER — CLINICAL SUPPORT (OUTPATIENT)
Dept: DERMATOLOGY | Facility: CLINIC | Age: 54
End: 2019-03-29
Payer: COMMERCIAL

## 2019-03-29 VITALS — RESPIRATION RATE: 18 BRPM | BODY MASS INDEX: 25.51 KG/M2 | WEIGHT: 158.75 LBS | HEIGHT: 66 IN

## 2019-03-29 DIAGNOSIS — Z48.02 VISIT FOR SUTURE REMOVAL: Primary | ICD-10-CM

## 2019-03-29 PROCEDURE — 99999 PR PBB SHADOW E&M-EST. PATIENT-LVL III: ICD-10-PCS | Mod: PBBFAC,,,

## 2019-03-29 PROCEDURE — 99024 POSTOP FOLLOW-UP VISIT: CPT | Mod: S$GLB,,, | Performed by: DERMATOLOGY

## 2019-03-29 PROCEDURE — 99999 PR PBB SHADOW E&M-EST. PATIENT-LVL III: CPT | Mod: PBBFAC,,,

## 2019-03-29 PROCEDURE — 99024 PR POST-OP FOLLOW-UP VISIT: ICD-10-PCS | Mod: S$GLB,,, | Performed by: DERMATOLOGY

## 2019-03-29 NOTE — PROGRESS NOTES
Suture Removal note:  CC: 53 y.o. female patient is here for suture removal.       HPI: Patient is s/p excision of pilar cyrst from the R parietal scalp on 3/15/2019.  Patient reports no problems.    WOUND PE:  Sutures intact.  Wound healing well.  Good approximation of skin edges.  No signs or symptoms of infection.    PLAN:  Sutures removed today.  Continue wound care.    1. Skin, right parietal scalp, excision:  - FOLLICULAR CYST, ISTHMUS-CATAGEN TYPE.  MICROSCOPIC DESCRIPTION: Sections show a cyst lined by keratinizing squamous epithelium showing abrupt  keratinization and filled with compact eosinophilic keratin material.  Diagnosed by: Shellie Michaels M.D.  (Electronically Signed: 2019-03-25 15:14:35)

## 2019-04-10 ENCOUNTER — TELEPHONE (OUTPATIENT)
Dept: FAMILY MEDICINE | Facility: CLINIC | Age: 54
End: 2019-04-10

## 2019-04-10 DIAGNOSIS — M79.645 FINGER PAIN, LEFT: Primary | ICD-10-CM

## 2019-04-11 ENCOUNTER — OFFICE VISIT (OUTPATIENT)
Dept: ORTHOPEDICS | Facility: CLINIC | Age: 54
End: 2019-04-11
Payer: COMMERCIAL

## 2019-04-11 ENCOUNTER — HOSPITAL ENCOUNTER (OUTPATIENT)
Dept: RADIOLOGY | Facility: OTHER | Age: 54
Discharge: HOME | End: 2019-04-11
Attending: ORTHOPAEDIC SURGERY
Payer: COMMERCIAL

## 2019-04-11 VITALS
HEIGHT: 66 IN | SYSTOLIC BLOOD PRESSURE: 94 MMHG | DIASTOLIC BLOOD PRESSURE: 64 MMHG | HEART RATE: 86 BPM | WEIGHT: 158 LBS | BODY MASS INDEX: 25.39 KG/M2

## 2019-04-11 DIAGNOSIS — M67.449 MUCOUS CYST OF FINGER: Primary | ICD-10-CM

## 2019-04-11 DIAGNOSIS — M79.645 FINGER PAIN, LEFT: ICD-10-CM

## 2019-04-11 DIAGNOSIS — R52 PAIN: Primary | ICD-10-CM

## 2019-04-11 DIAGNOSIS — R52 PAIN: ICD-10-CM

## 2019-04-11 PROCEDURE — 73130 X-RAY EXAM OF HAND: CPT | Mod: 26,LT,, | Performed by: RADIOLOGY

## 2019-04-11 PROCEDURE — 99204 OFFICE O/P NEW MOD 45 MIN: CPT | Mod: S$GLB,,, | Performed by: ORTHOPAEDIC SURGERY

## 2019-04-11 PROCEDURE — 73130 X-RAY EXAM OF HAND: CPT | Mod: TC,FY,LT

## 2019-04-11 PROCEDURE — 73130 XR HAND COMPLETE 3 VIEW LEFT: ICD-10-PCS | Mod: 26,LT,, | Performed by: RADIOLOGY

## 2019-04-11 PROCEDURE — 99999 PR PBB SHADOW E&M-EST. PATIENT-LVL III: ICD-10-PCS | Mod: PBBFAC,,, | Performed by: ORTHOPAEDIC SURGERY

## 2019-04-11 PROCEDURE — 99204 PR OFFICE/OUTPT VISIT, NEW, LEVL IV, 45-59 MIN: ICD-10-PCS | Mod: S$GLB,,, | Performed by: ORTHOPAEDIC SURGERY

## 2019-04-11 PROCEDURE — 99999 PR PBB SHADOW E&M-EST. PATIENT-LVL III: CPT | Mod: PBBFAC,,, | Performed by: ORTHOPAEDIC SURGERY

## 2019-04-12 NOTE — PROGRESS NOTES
I have personally taken the history and examined the patient. I agree with the Hand Surgery PA's note. The plan will be surgical excision of osteophytes and mucous cyst. Pt has significant deformity of index finger with mallet deformity. Discussed fusion vs osteophyte excision. Pt understands the risk of continued mallet deformity and that the index finger is deviated. She does not want a fusion, she prefers just osteophyte excision.    I have explained the risks, benefits, and alternatives of the procedure to the patient in great detail. The patient voices understanding and all questions have been answered. The patient agrees with to proceed as planned. Consents were performed in clinic.

## 2019-04-22 ENCOUNTER — TELEPHONE (OUTPATIENT)
Dept: ORTHOPEDICS | Facility: CLINIC | Age: 54
End: 2019-04-22

## 2019-04-22 NOTE — TELEPHONE ENCOUNTER
----- Message from Taryn Darling sent at 4/22/2019  8:53 AM CDT -----  Contact: pt  Name of Who is Calling: KWAKU ORR [[id number]]      What is the request in detail:pt needs to cancel surgery appt... please advise      Can the clinic reply by [facility]: no      [facility] [phone]

## 2019-04-22 NOTE — TELEPHONE ENCOUNTER
Spoke w/pt, surgery rescheduled from 4/29/19 to [facility] Nish Ibarra on 6/28/19. Pt will call back if need to further r/s surgery.

## 2019-04-23 RX ORDER — BUPROPION HYDROCHLORIDE 200 MG/1
TABLET, EXTENDED RELEASE ORAL
Qty: 180 TABLET | Refills: 0 | Status: SHIPPED | OUTPATIENT
Start: 2019-04-23 | End: 2019-07-20 | Stop reason: SDUPTHER

## 2019-06-12 ENCOUNTER — OFFICE VISIT (OUTPATIENT)
Dept: DERMATOLOGY | Facility: CLINIC | Age: 54
End: 2019-06-12
Payer: COMMERCIAL

## 2019-06-12 ENCOUNTER — HOSPITAL ENCOUNTER (OUTPATIENT)
Dept: RADIOLOGY | Facility: HOSPITAL | Age: 54
Discharge: HOME | End: 2019-06-12
Attending: FAMILY MEDICINE
Payer: COMMERCIAL

## 2019-06-12 DIAGNOSIS — M67.449 DIGITAL MUCINOUS CYST: ICD-10-CM

## 2019-06-12 DIAGNOSIS — D48.9 NEOPLASM OF UNCERTAIN BEHAVIOR: Primary | ICD-10-CM

## 2019-06-12 DIAGNOSIS — Z12.39 BREAST CANCER SCREENING: ICD-10-CM

## 2019-06-12 PROCEDURE — 77067 MAMMO DIGITAL SCREENING BILAT WITH TOMOSYNTHESIS_CAD: ICD-10-PCS | Mod: 26,,, | Performed by: RADIOLOGY

## 2019-06-12 PROCEDURE — 77067 SCR MAMMO BI INCL CAD: CPT | Mod: TC,PO

## 2019-06-12 PROCEDURE — 99499 UNLISTED E&M SERVICE: CPT | Mod: S$GLB,,, | Performed by: DERMATOLOGY

## 2019-06-12 PROCEDURE — 77063 BREAST TOMOSYNTHESIS BI: CPT | Mod: 26,,, | Performed by: RADIOLOGY

## 2019-06-12 PROCEDURE — 99999 PR PBB SHADOW E&M-EST. PATIENT-LVL II: CPT | Mod: PBBFAC,,, | Performed by: DERMATOLOGY

## 2019-06-12 PROCEDURE — 11104 PUNCH BX SKIN SINGLE LESION: CPT | Mod: S$GLB,,, | Performed by: DERMATOLOGY

## 2019-06-12 PROCEDURE — 99999 PR PBB SHADOW E&M-EST. PATIENT-LVL II: ICD-10-PCS | Mod: PBBFAC,,, | Performed by: DERMATOLOGY

## 2019-06-12 PROCEDURE — 88304 TISSUE EXAM BY PATHOLOGIST: CPT | Performed by: PATHOLOGY

## 2019-06-12 PROCEDURE — 11104 PR PUNCH BIOPSY, SKIN, SINGLE LESION: ICD-10-PCS | Mod: S$GLB,,, | Performed by: DERMATOLOGY

## 2019-06-12 PROCEDURE — 99499 NO LOS: ICD-10-PCS | Mod: S$GLB,,, | Performed by: DERMATOLOGY

## 2019-06-12 PROCEDURE — 77067 SCR MAMMO BI INCL CAD: CPT | Mod: 26,,, | Performed by: RADIOLOGY

## 2019-06-12 PROCEDURE — 88304 TISSUE SPECIMEN TO PATHOLOGY, DERMATOLOGY: ICD-10-PCS | Mod: 26,,, | Performed by: PATHOLOGY

## 2019-06-12 PROCEDURE — 77063 MAMMO DIGITAL SCREENING BILAT WITH TOMOSYNTHESIS_CAD: ICD-10-PCS | Mod: 26,,, | Performed by: RADIOLOGY

## 2019-06-12 NOTE — PROGRESS NOTES
Subjective:       Patient ID:  Georgia Gomez is a 54 y.o. female who presents for   Chief Complaint   Patient presents with    Mole     Julieta Gomez 55 yo female presents for mole check right cheek .  Noticed changes in size, color and has raised  over the last several months. States has uses acne medication on it thinking it was a pimple and has picked at it.    negative history of NMSC  negative family history of melanoma      Past Medical History:  No date: Arthritis  No date: Headache(784.0)  No date: Rash          Mole         Review of Systems   Constitutional: Negative.    Skin: Positive for sun sensitivity, daily sunscreen use and activity-related sunscreen use.   Hematologic/Lymphatic: Does not bruise/bleed easily.        Objective:    Physical Exam   Constitutional: She appears well-developed and well-nourished. No distress.   Neurological: She is alert and oriented to person, place, and time. She is not disoriented.   Psychiatric: She has a normal mood and affect.   Skin:   Areas Examined (abnormalities noted in diagram):   Nails and Digits Inspection Performed                  Diagram Legend     Erythematous scaling macule/papule c/w actinic keratosis       Vascular papule c/w angioma      Pigmented verrucoid papule/plaque c/w seborrheic keratosis      Yellow umbilicated papule c/w sebaceous hyperplasia      Irregularly shaped tan macule c/w lentigo     1-2 mm smooth white papules consistent with Milia      Movable subcutaneous cyst with punctum c/w epidermal inclusion cyst      Subcutaneous movable cyst c/w pilar cyst      Firm pink to brown papule c/w dermatofibroma      Pedunculated fleshy papule(s) c/w skin tag(s)      Evenly pigmented macule c/w junctional nevus     Mildly variegated pigmented, slightly irregular-bordered macule c/w mildly atypical nevus      Flesh colored to evenly pigmented papule c/w intradermal nevus       Pink pearly papule/plaque c/w basal cell carcinoma       Erythematous hyperkeratotic cursted plaque c/w SCC      Surgical scar with no sign of skin cancer recurrence      Open and closed comedones      Inflammatory papules and pustules      Verrucoid papule consistent consistent with wart     Erythematous eczematous patches and plaques     Dystrophic onycholytic nail with subungual debris c/w onychomycosis     Umbilicated papule    Erythematous-base heme-crusted tan verrucoid plaque consistent with inflamed seborrheic keratosis     Erythematous Silvery Scaling Plaque c/w Psoriasis     See annotation          Assessment / Plan:      Pathology Orders:     Normal Orders This Visit    Tissue Specimen To Pathology, Dermatology     Questions:    Directional Terms:  Other(comment)    Clinical Information:  EIC vs inflammatory papule    Specific Site:  right cheek        Neoplasm of uncertain behavior  -     Tissue Specimen To Pathology, Dermatology    Punch biopsy procedure note:  Punch biopsy performed after verbal consent obtained. Area marked and prepped with alcohol. Approximately 1cc of 1% lidocaine with epinephrine injected. 4 mm disposable punch used to remove lesion. Hemostasis obtained and biopsy site closed with 1 - 2 Prolene sutures. Wound care instructions reviewed with patient and handout given.      Digital mucinous cyst  - Discussed etiology and possible connection to joint.   - She has surgery with hand surgeon scheduled             Follow up if symptoms worsen or fail to improve.     ADDENDUM:  1. Skin, right cheek, punch biopsy:  - FOLLICULAR CYST, INFUNDIBULAR TYPE.  MICROSCOPIC DESCRIPTION: Sections show a cyst lined by keratinizing squamous epithelium showing a visible  granular layer and filled with loose laminated keratin.  Diagnosed by: Shellie Michaels M.D.    Benign. No further treatment is needed.

## 2019-06-18 ENCOUNTER — CLINICAL SUPPORT (OUTPATIENT)
Dept: DERMATOLOGY | Facility: CLINIC | Age: 54
End: 2019-06-18
Payer: COMMERCIAL

## 2019-06-18 VITALS — WEIGHT: 158 LBS | BODY MASS INDEX: 25.39 KG/M2 | HEIGHT: 66 IN

## 2019-06-18 DIAGNOSIS — Z48.02 VISIT FOR SUTURE REMOVAL: Primary | ICD-10-CM

## 2019-06-18 PROCEDURE — 99999 PR PBB SHADOW E&M-EST. PATIENT-LVL III: ICD-10-PCS | Mod: PBBFAC,,,

## 2019-06-18 PROCEDURE — 99024 PR POST-OP FOLLOW-UP VISIT: ICD-10-PCS | Mod: S$GLB,,, | Performed by: DERMATOLOGY

## 2019-06-18 PROCEDURE — 99999 PR PBB SHADOW E&M-EST. PATIENT-LVL III: CPT | Mod: PBBFAC,,,

## 2019-06-18 PROCEDURE — 99024 POSTOP FOLLOW-UP VISIT: CPT | Mod: S$GLB,,, | Performed by: DERMATOLOGY

## 2019-06-18 NOTE — PROGRESS NOTES
.Patient presents for suture removal. The wound is well healed without signs of infection.  The sutures are removed. Wound care and activity instructions given. Return prn.

## 2019-06-21 ENCOUNTER — TELEPHONE (OUTPATIENT)
Dept: ORTHOPEDICS | Facility: CLINIC | Age: 54
End: 2019-06-21

## 2019-06-21 NOTE — TELEPHONE ENCOUNTER
Informed patient that surgery would be cancelled on 6/28 at [facility]. Patient verbalized understanding.  Will cancel on Monday. Surgery center is closed for the day.

## 2019-07-12 ENCOUNTER — OFFICE VISIT (OUTPATIENT)
Dept: FAMILY MEDICINE | Facility: CLINIC | Age: 54
End: 2019-07-12
Payer: COMMERCIAL

## 2019-07-12 VITALS
TEMPERATURE: 98 F | SYSTOLIC BLOOD PRESSURE: 116 MMHG | WEIGHT: 152.75 LBS | HEART RATE: 80 BPM | HEIGHT: 66 IN | DIASTOLIC BLOOD PRESSURE: 74 MMHG | RESPIRATION RATE: 18 BRPM | BODY MASS INDEX: 24.55 KG/M2 | OXYGEN SATURATION: 98 %

## 2019-07-12 DIAGNOSIS — G89.29 CHRONIC BILATERAL LOW BACK PAIN WITHOUT SCIATICA: Primary | ICD-10-CM

## 2019-07-12 DIAGNOSIS — M54.16 LUMBAR RADICULOPATHY: ICD-10-CM

## 2019-07-12 DIAGNOSIS — M54.50 CHRONIC BILATERAL LOW BACK PAIN WITHOUT SCIATICA: Primary | ICD-10-CM

## 2019-07-12 PROCEDURE — 3008F PR BODY MASS INDEX (BMI) DOCUMENTED: ICD-10-PCS | Mod: CPTII,S$GLB,, | Performed by: FAMILY MEDICINE

## 2019-07-12 PROCEDURE — 99999 PR PBB SHADOW E&M-EST. PATIENT-LVL V: CPT | Mod: PBBFAC,,, | Performed by: FAMILY MEDICINE

## 2019-07-12 PROCEDURE — 3008F BODY MASS INDEX DOCD: CPT | Mod: CPTII,S$GLB,, | Performed by: FAMILY MEDICINE

## 2019-07-12 PROCEDURE — 99214 PR OFFICE/OUTPT VISIT, EST, LEVL IV, 30-39 MIN: ICD-10-PCS | Mod: S$GLB,,, | Performed by: FAMILY MEDICINE

## 2019-07-12 PROCEDURE — 99214 OFFICE O/P EST MOD 30 MIN: CPT | Mod: S$GLB,,, | Performed by: FAMILY MEDICINE

## 2019-07-12 PROCEDURE — 99999 PR PBB SHADOW E&M-EST. PATIENT-LVL V: ICD-10-PCS | Mod: PBBFAC,,, | Performed by: FAMILY MEDICINE

## 2019-07-12 RX ORDER — CYCLOBENZAPRINE HCL 5 MG
5 TABLET ORAL NIGHTLY PRN
Qty: 20 TABLET | Refills: 0 | Status: SHIPPED | OUTPATIENT
Start: 2019-07-12 | End: 2019-07-22

## 2019-07-12 RX ORDER — TESTOSTERONE 25 MG/2.5G
GEL TRANSDERMAL
COMMUNITY
End: 2020-02-21

## 2019-07-18 ENCOUNTER — CLINICAL SUPPORT (OUTPATIENT)
Dept: REHABILITATION | Facility: HOSPITAL | Age: 54
End: 2019-07-18
Attending: FAMILY MEDICINE
Payer: COMMERCIAL

## 2019-07-18 DIAGNOSIS — M54.50 CHRONIC BILATERAL LOW BACK PAIN WITHOUT SCIATICA: ICD-10-CM

## 2019-07-18 DIAGNOSIS — M25.69 BACK STIFFNESS: ICD-10-CM

## 2019-07-18 DIAGNOSIS — G89.29 CHRONIC BILATERAL LOW BACK PAIN WITHOUT SCIATICA: ICD-10-CM

## 2019-07-18 PROCEDURE — 97110 THERAPEUTIC EXERCISES: CPT | Mod: PO

## 2019-07-18 PROCEDURE — 97162 PT EVAL MOD COMPLEX 30 MIN: CPT | Mod: PO

## 2019-07-18 NOTE — PATIENT INSTRUCTIONS
Posture - Sitting    Sit upright, head facing forward. Scoot your tailbone all the way to the back of your chair. Lean slightly forward and place a rolled up towel at your waist. Lean back so shoulder blades lightly touch the back of the chair. Keep shoulders relaxed, and avoid rounded back. Keep hips level with knees. Avoid crossing legs for long periods.       Lumbar Rotation    Feet on floor, slowly rock knees from side to side in small, pain-free range of motion. Allow lower back to rotate slightly, but keep shoulders flat on ground. Hold 2 seconds.  Repeat 10 times per set. Do 1 sets per session. Do 3 sessions per day.         Pelvic Tilt    Flatten back by tightening stomach muscles and buttocks. Do not hold your breath.  Hold 5 seconds.  Repeat 10 times per set. Do 1 sets per session. Do 3 sessions per day.    Copyright © [employer]. All rights reserved.    Abdominal Lying        Lie on abdomen, pillows as needed under hips, ankles, forehead and chest. Rest forehead on hands or on folded towel as needed and/or instructed by your therapist.  Lie 2 minutes, 3 times per day.    Copyright © [employer]. All rights reserved.

## 2019-07-18 NOTE — PLAN OF CARE
[facility]  Physical Therapy Initial Evaluation    Name: Kwaku oGmez  Clinic Number: [id number]    Therapy Diagnosis:   Encounter Diagnoses   Name Primary?    Back stiffness     Chronic bilateral low back pain without sciatica      Physician: Grace Cuello MD    Physician Orders: PT Eval and Treat   Medical Diagnosis from Referral: M54.5,G89.29 (ICD-10-CM) - Chronic bilateral low back pain without sciatica  Evaluation Date: 7/18/2019  Authorization Period Expiration: 12/31/19  Plan of Care Expiration: 9/13/19  Visit # / Visits authorized: 1/ 1    Time In: 8:06 am  Time Out: 9:00 am  Total Billable Time: 54 minutes    Precautions: Standard    Subjective   Date of onset: 2 months  History of current condition - KWAKU reports: for 2 months she has been having hip pain and low back pain. It was painful with rolling over in bed and for standing for any period of time. She went to the doctor last week and forgot that a year ago she had gone into the doctor for the same problem and had no issues in the hip with x-rays but arthritis in the lumbar spine. She decided to try PT and has an MRI Tuesday, and possibly an injection with pain management if necessary. Since 2 months ago, the pain has stayed about the same or gotten slightly worse. She does not remember anything that happened around that time. The pain does not go down the leg. She tends to lay on her left side when sleeping with a pillow between her knees.      Medical History:   Past Medical History:   Diagnosis Date    Arthritis     Headache(784.0)     Rash        Surgical History:   Kwaku Gomez  has a past surgical history that includes Hysterectomy (10/2014).    Medications:   Kwaku has a current medication list which includes the following prescription(s): biotin, bupropion, testosterone, cyclobenzaprine, estradiol 0.05 mg/24 hr td ptsw, hydrocortisone, multivitamin, testosterone, and vitamin d.    Allergies:    Review of patient's allergies indicates:  No Known Allergies     Imaging, x-ray: 1. Degenerative change of the lumbar spine.  Given the provided history of sciatica, consideration should be given to performing MRI of the lumbar spine.  2. Possible age-indeterminate superior endplate compression deformity at L1.  This is of uncertain clinical relevance given the patient's symptoms in the lower lumbar spine.  Additional evaluation with MRI could provide additional diagnostic information as deemed clinically appropriate.    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, a lot of sitting; had bought standing desk but sits now more due to pain  Prior Level of Function: no difficulty with ADLs  Current Level of Function: pain with brushing teeth, anything standing, getting dressed; shopping    Pain:  Current 2/10, worst 5/10 with movement after standing, best 2/10   Location: bilateral hips and midline spine   Description: stabbing  Aggravating Factors: Standing and Morning  Easing Factors: advil, ice    Pts goals: not have the pain    Objective     Observation: no acute distress; changes positions often    Posture:  fair    Lumbar Range of Motion:    Degrees Pain   Flexion To toes   Pain on return        Extension 50% of normal   +        Left Side Bending To joint line +        Right Side Bending To distal thigh ++        Left rotation   50% of normal ++ R hip        Right Rotation   75% of normal + L hip             Lower Extremity Strength  Right LE  Left LE    Knee extension: 5/5 Knee extension: 5/5   Knee flexion: 5/5 Knee flexion: 5/5   Hip flexion: 4+/5 Hip flexion: 4+/5   Hip extension:  4+/5 Hip extension: 4+/5   Hip abduction: 4+/5 Hip abduction: 4+/5   Hip adduction: 4+/5 Hip adduction  "4+/5   Ankle dorsiflexion: 5/5 Ankle dorsiflexion: 5/5   Ankle plantarflexion: 5/5 Ankle plantarflexion: 5/5         Special Tests:  -Repeated Flexion: I, W (R and LBP)  -Repeated Ext: I, W (R and LBP)  -prone lying: no pain in position  -Bridge Test: + pain  -Leg Length: B: 89 cm  -SI compression, distraction: -  -Colin: + B    Neuro Dynamic Testing:    Sciatic nerve:      SLR: R = -     L = -        Palpation: tenderness to palpation at bilateral PSIS    Sensation: no sensation deficits        CMS Impairment/Limitation/Restriction for FOTO Lumbar Spine Survey    Therapist reviewed FOTO scores for Georgia Castillote on 7/18/2019.   FOTO documents entered into [employer] - see Media section.    Limitation Score: 35%  Category: Mobility         TREATMENT   Treatment Time In: 8:45 am  Treatment Time Out: 9:00 am  Total Treatment time separate from Evaluation: 15 minutes    GEORGIA received therapeutic exercises to develop ROM, flexibility, posture and core stabilization for 15 minutes including:  Prone lying x3 min  TA set 10x5"  LTR x10 each    Home Exercises and Patient Education Provided    Education provided:   - pathophysiology of lumbar facet arthrosis    Written Home Exercises Provided: yes.  Exercises were reviewed and GEORGIA was able to demonstrate them prior to the end of the session.  GEORGIA demonstrated good  understanding of the education provided.     See EMR under Patient Instructions for exercises provided 7/18/2019.    Assessment   Georgia is a 54 y.o. female referred to outpatient Physical Therapy with a medical diagnosis of chronic bilateral low back pain without sciatica. Pt presents with decreased lumbar ROM, decreased hip and core stability, decreased tolerance for functional mobility, and pelvic malalignment. She would benefit from skilled PT services to improve ROM, strength, and functional mobility for return to prior level of function.     Pt prognosis is Good.   Pt will benefit from skilled " outpatient Physical Therapy to address the deficits stated above and in the chart below, provide pt/family education, and to maximize pt's level of independence.     Plan of care discussed with patient: Yes  Pt's spiritual, cultural and educational needs considered and patient is agreeable to the plan of care and goals as stated below:     Anticipated Barriers for therapy: none anticipated    Medical Necessity is demonstrated by the following  History  Co-morbidities and personal factors that may impact the plan of care Co-morbidities:   depression, JUSTIN    Personal Factors:   no deficits     moderate   Examination  Body Structures and Functions, activity limitations and participation restrictions that may impact the plan of care Body Regions:   back  lower extremities    Body Systems:    gross symmetry  ROM  strength  gross coordinated movement  balance  gait  transfers  transitions  motor control  motor learning    Participation Restrictions:   Unable to participate in ADLs    Activity limitations:   Learning and applying knowledge  no deficits    General Tasks and Commands  no deficits    Communication  no deficits    Mobility  lifting and carrying objects  walking    Self care  washing oneself (bathing, drying, washing hands)  caring for body parts (brushing teeth, shaving, grooming)  dressing    Domestic Life  shopping  cooking  doing house work (cleaning house, washing dishes, laundry)    Interactions/Relationships  no deficits    Life Areas  employment    Community and Social Life  community life  recreation and leisure         moderate   Clinical Presentation evolving clinical presentation with changing clinical characteristics moderate   Decision Making/ Complexity Score: moderate     Goals:  Short Term Goals: 4 weeks   - stand with appropriate posture with min cueing and no increase in pain for 10 min  - lift 10 # floor to waist with proper technique with min pain with min cueing  - have less pain with bed  mobility and complete in usual time  - pt will be able to perform TA set with LE movement to demonstrate improved lumbar and pelvic stability    Long Term Goals: 8 weeks   - pt will demonstrate improved lumbar ROM with decreased pain symptoms  - pt will demonstrate 5/5 LE strength via MMT for improved stability  - pt will tolerate standing for full house showing without requiring a rest break for improved work tolerance  - pt will report <2/10 pain at worst with ADLs for improved functional tolerance    Plan   Plan of care Certification: 7/18/2019 to 9/13/19.    Outpatient Physical Therapy 2 times weekly for 8 weeks to include the following interventions: Aquatic Therapy, Electrical Stimulation for pain control, Gait Training, Manual Therapy, Moist Heat/ Ice, Neuromuscular Re-ed, Patient Education, Therapeutic Activites, Therapeutic Exercise and dry needling.     Migdalia Varela, PT

## 2019-07-22 ENCOUNTER — CLINICAL SUPPORT (OUTPATIENT)
Dept: REHABILITATION | Facility: HOSPITAL | Age: 54
End: 2019-07-22
Attending: FAMILY MEDICINE
Payer: COMMERCIAL

## 2019-07-22 DIAGNOSIS — G89.29 CHRONIC BILATERAL LOW BACK PAIN WITHOUT SCIATICA: ICD-10-CM

## 2019-07-22 DIAGNOSIS — M25.69 BACK STIFFNESS: ICD-10-CM

## 2019-07-22 DIAGNOSIS — M54.50 CHRONIC BILATERAL LOW BACK PAIN WITHOUT SCIATICA: ICD-10-CM

## 2019-07-22 PROCEDURE — 97110 THERAPEUTIC EXERCISES: CPT | Mod: PO

## 2019-07-22 PROCEDURE — 97140 MANUAL THERAPY 1/> REGIONS: CPT | Mod: PO

## 2019-07-22 RX ORDER — BUPROPION HYDROCHLORIDE 200 MG/1
TABLET, EXTENDED RELEASE ORAL
Qty: 180 TABLET | Refills: 1 | Status: SHIPPED | OUTPATIENT
Start: 2019-07-22 | End: 2019-11-10 | Stop reason: SDUPTHER

## 2019-07-22 NOTE — PROGRESS NOTES
"  Physical Therapy Daily Treatment Note     Name: Kwaku Gomez  Clinic Number: [id number]    Therapy Diagnosis:   Encounter Diagnoses   Name Primary?    Back stiffness     Chronic bilateral low back pain without sciatica      Physician: Grace Cuello MD    Visit Date: 7/22/2019  Physician Orders: PT Eval and Treat   Medical Diagnosis from Referral: M54.5,G89.29 (ICD-10-CM) - Chronic bilateral low back pain without sciatica  Evaluation Date: 7/18/2019  Authorization Period Expiration: 12/31/19  Plan of Care Expiration: 9/13/19  Visit # / Visits authorized: 2/ 30    Time In: 7:00 am  Time Out: 7:50 am  Total Billable Time: 50 minutes    Precautions: Standard    Subjective     Pt reports: she is painful today. She does not feel that the exercises are helping any yet. She has some pain with the LTR with the legs going to the left.  She was compliant with home exercise program.  Response to previous treatment: no adverse effects  Functional change: none yet    Pain: 4/10  Location: left back  and buttocks      Objective     KWAKU received therapeutic exercises to develop strength, endurance, ROM, flexibility and core stabilization for 40 minutes including:  Recumbent bike x5 min  Prone lying x3 min  TA set 20x5"  TA set with bent knee fallout x10 each  TA set with hip adduction squeeze 2x10  LTR x10 each- NP due to symptoms  Supine knee to opposite shoulder stretch 3x30 sec each  Sidelying clamshell 2x10 each  Standing TA set 20x5"  Standing palloff press tandem stance x20 each  Lateral monster walks yellow theraband around knees x50' each- d/c due to pain    Kwaku received 10 min manual therapy, including:  Intermittent manual lumbar traction with strap x5 min  MET for L innominate inferior glide with hip hike x5 min    Home Exercises Provided and Patient Education Provided     Education provided:   - pathophysiology    Written Home Exercises Provided: yes.  Exercises were reviewed and KWAKU was able to " demonstrate them prior to the end of the session.  KWAKU demonstrated good  understanding of the education provided.     See EMR under Patient Instructions for exercises provided prior visit.    Assessment     Reported reduction in symptoms with manual traction. Will trial traction table next session. Demonstrated L innominate superior glide, improved with MET. Trialed monster walks, but these gave pt shooting pains down L leg.   KWAKU is progressing well towards her goals.   Pt prognosis is Good.     Pt will continue to benefit from skilled outpatient physical therapy to address the deficits listed in the problem list box on initial evaluation, provide pt/family education and to maximize pt's level of independence in the home and community environment.     Pt's spiritual, cultural and educational needs considered and pt agreeable to plan of care and goals.    Anticipated barriers to physical therapy: none anticipated    Goals:   Short Term Goals: 4 weeks   - stand with appropriate posture with min cueing and no increase in pain for 10 min (progressing, not met)  - lift 10 # floor to waist with proper technique with min pain with min cueing (progressing, not met)  - have less pain with bed mobility and complete in usual time (progressing, not met)  - pt will be able to perform TA set with LE movement to demonstrate improved lumbar and pelvic stability (progressing, not met)     Long Term Goals: 8 weeks   - pt will demonstrate improved lumbar ROM with decreased pain symptoms (progressing, not met)  - pt will demonstrate 5/5 LE strength via MMT for improved stability (progressing, not met)  - pt will tolerate standing for full house showing without requiring a rest break for improved work tolerance (progressing, not met)  - pt will report <2/10 pain at worst with ADLs for improved functional tolerance (progressing, not met)    Plan     Continue per POC, addressing manual therapy and strengthening needs as  tolerated.    Migdalia Varela, PT

## 2019-07-22 NOTE — TELEPHONE ENCOUNTER
Pt Of Grace Cuello MD    Last seen on: 7/12/2019  Next appt: none  Last refill: 4/23/2019    Pharmacy:   [facility] #[id number] - [place] - [address] [address]  [address] [place] [id number]  Phone: [phone] Fax: [phone]    Please review! Thank you!

## 2019-07-23 ENCOUNTER — HOSPITAL ENCOUNTER (OUTPATIENT)
Dept: RADIOLOGY | Facility: HOSPITAL | Age: 54
Discharge: HOME | End: 2019-07-23
Attending: FAMILY MEDICINE
Payer: COMMERCIAL

## 2019-07-23 DIAGNOSIS — M54.50 CHRONIC BILATERAL LOW BACK PAIN WITHOUT SCIATICA: ICD-10-CM

## 2019-07-23 DIAGNOSIS — G89.29 CHRONIC BILATERAL LOW BACK PAIN WITHOUT SCIATICA: ICD-10-CM

## 2019-07-23 PROCEDURE — 72148 MRI LUMBAR SPINE W/O DYE: CPT | Mod: 26,,, | Performed by: RADIOLOGY

## 2019-07-23 PROCEDURE — 72148 MRI LUMBAR SPINE WITHOUT CONTRAST: ICD-10-PCS | Mod: 26,,, | Performed by: RADIOLOGY

## 2019-07-23 PROCEDURE — 72148 MRI LUMBAR SPINE W/O DYE: CPT | Mod: TC,PO

## 2019-07-24 ENCOUNTER — CLINICAL SUPPORT (OUTPATIENT)
Dept: REHABILITATION | Facility: HOSPITAL | Age: 54
End: 2019-07-24
Attending: FAMILY MEDICINE
Payer: COMMERCIAL

## 2019-07-24 DIAGNOSIS — G89.29 CHRONIC BILATERAL LOW BACK PAIN WITHOUT SCIATICA: ICD-10-CM

## 2019-07-24 DIAGNOSIS — M25.69 BACK STIFFNESS: ICD-10-CM

## 2019-07-24 DIAGNOSIS — M54.50 CHRONIC BILATERAL LOW BACK PAIN WITHOUT SCIATICA: ICD-10-CM

## 2019-07-24 PROCEDURE — 97110 THERAPEUTIC EXERCISES: CPT | Mod: PO

## 2019-07-24 PROCEDURE — 97012 MECHANICAL TRACTION THERAPY: CPT | Mod: PO

## 2019-07-24 NOTE — PROGRESS NOTES
"  Physical Therapy Daily Treatment Note     Name: Kwaku Gomez  Clinic Number: [id number]    Therapy Diagnosis:   Encounter Diagnoses   Name Primary?    Back stiffness     Chronic bilateral low back pain without sciatica      Physician: Grace Cuello MD    Visit Date: 7/24/2019  Physician Orders: PT Eval and Treat   Medical Diagnosis from Referral: M54.5,G89.29 (ICD-10-CM) - Chronic bilateral low back pain without sciatica  Evaluation Date: 7/18/2019  Authorization Period Expiration: 12/31/19  Plan of Care Expiration: 9/13/19  Visit # / Visits authorized: 3/ 30    Time In: 7:00 am  Time Out: 7:55 am  Total Billable Time: 55 minutes    Precautions: Standard    Subjective     Pt reports: she is still painful today, and is limping due to the pain. She considered canceling. She had her MRI yesterday, and had a lot of pain after that.   She was compliant with home exercise program.  Response to previous treatment: no adverse effects  Functional change: none yet    Pain: 5/10  Location: left back  and buttocks      Objective     KWAKU received therapeutic exercises to develop strength, endurance, ROM, flexibility and core stabilization for 40 minutes including:  Recumbent bike x5 min- NP  Positional traction with red stability ball with heat x7 min  Prone lying x3 min  TA set 20x5"  TA set with bent knee fallout x10 each  TA set with hip adduction squeeze 2x10  LTR x10 each- NP due to symptoms  Supine knee to opposite shoulder stretch 3x30 sec each  Sidelying clamshell 2x10 each- NP  Standing TA set 20x5"  Standing palloff press tandem stance x20 each- NP  Lateral monster walks yellow theraband around knees x50' each- d/c due to pain    Kwaku received 15 min traction, including:  15 min intermittent traction 40-70lb, 60 sec hold, 20 sec off  Intermittent manual lumbar traction with strap x5 min- NP  MET for L innominate inferior glide with hip hike x5 min- NP    Home Exercises Provided and Patient Education " Provided     Education provided:   - pathophysiology    Written Home Exercises Provided: yes.  Exercises were reviewed and KWAKU was able to demonstrate them prior to the end of the session.  KWAKU demonstrated good  understanding of the education provided.     See EMR under Patient Instructions for exercises provided prior visit.    Assessment     Decreased exercises due to poor tolerance last session. Trialed traction unit with reports of mild discomfort in low back but not as much pain in the hip. Educated her about centralization and that this movement of pain is good. Reported feeling better after prone lying still. Less of a gait abnormality when leaving session today.   KWAKU is progressing well towards her goals.   Pt prognosis is Good.     Pt will continue to benefit from skilled outpatient physical therapy to address the deficits listed in the problem list box on initial evaluation, provide pt/family education and to maximize pt's level of independence in the home and community environment.     Pt's spiritual, cultural and educational needs considered and pt agreeable to plan of care and goals.    Anticipated barriers to physical therapy: none anticipated    Goals:   Short Term Goals: 4 weeks   - stand with appropriate posture with min cueing and no increase in pain for 10 min (progressing, not met)  - lift 10 # floor to waist with proper technique with min pain with min cueing (progressing, not met)  - have less pain with bed mobility and complete in usual time (progressing, not met)  - pt will be able to perform TA set with LE movement to demonstrate improved lumbar and pelvic stability (progressing, not met)     Long Term Goals: 8 weeks   - pt will demonstrate improved lumbar ROM with decreased pain symptoms (progressing, not met)  - pt will demonstrate 5/5 LE strength via MMT for improved stability (progressing, not met)  - pt will tolerate standing for full house showing without requiring a rest  break for improved work tolerance (progressing, not met)  - pt will report <2/10 pain at worst with ADLs for improved functional tolerance (progressing, not met)    Plan     Continue per POC, addressing manual therapy and strengthening needs as tolerated.    Migdalia Varela, PT

## 2019-07-30 ENCOUNTER — CLINICAL SUPPORT (OUTPATIENT)
Dept: REHABILITATION | Facility: HOSPITAL | Age: 54
End: 2019-07-30
Attending: FAMILY MEDICINE
Payer: COMMERCIAL

## 2019-07-30 DIAGNOSIS — G89.29 CHRONIC BILATERAL LOW BACK PAIN WITHOUT SCIATICA: ICD-10-CM

## 2019-07-30 DIAGNOSIS — M54.50 CHRONIC BILATERAL LOW BACK PAIN WITHOUT SCIATICA: ICD-10-CM

## 2019-07-30 DIAGNOSIS — M25.69 BACK STIFFNESS: ICD-10-CM

## 2019-07-30 PROCEDURE — 97110 THERAPEUTIC EXERCISES: CPT | Mod: PO

## 2019-07-30 PROCEDURE — 97140 MANUAL THERAPY 1/> REGIONS: CPT | Mod: PO

## 2019-07-30 NOTE — PROGRESS NOTES
"  Physical Therapy Daily Treatment Note     Name: Kwaku Gomez  Clinic Number: [id number]    Therapy Diagnosis:   Encounter Diagnoses   Name Primary?    Back stiffness     Chronic bilateral low back pain without sciatica      Physician: Grace Cuello MD    Visit Date: 7/30/2019  Physician Orders: PT Eval and Treat   Medical Diagnosis from Referral: M54.5,G89.29 (ICD-10-CM) - Chronic bilateral low back pain without sciatica  Evaluation Date: 7/18/2019  Authorization Period Expiration: 12/31/19  Plan of Care Expiration: 9/13/19  Visit # / Visits authorized: 4/ 30    Time In: 7:00 am  Time Out: 7:55 am  Total Billable Time: 55 minutes    Precautions: Standard    Subjective     Pt reports: she feels her pain has gotten out of the hips and is just in the low back today. She realized she wasn't limping or having buttocks pain last night but thinks that has been the case since the night of the last session. She went out of town this weekend to visit family, and had no issues with the flight or with sitting in the hospital.   She was compliant with home exercise program.  Response to previous treatment: no adverse effects  Functional change: none yet    Pain: 5/10  Location: left back  and buttocks      Objective     KWAKU received therapeutic exercises to develop strength, endurance, ROM, flexibility and core stabilization for 45 minutes including:  Recumbent bike x5 min- NP  Positional traction with red stability ball with heat x7 min  Prone lying x3 min  TA set 20x5"  TA set with hooklying marching x10 each  TA set with hip adduction squeeze 2x10  LTR x10 each- NP due to symptoms  DL bridge 2x10  Quadruped LE extension x10 each  MedX lumbar extension x15 40#  Supine knee to opposite shoulder stretch 3x30 sec each  Sidelying clamshell 2x10 each  Standing TA set with red stability ball 20x5"  Standing palloff press tandem stance x20 each- NP  Lateral monster walks yellow theraband around knees x50' each- d/c due to " pain    Georgia received 10 min manual therapy, including:  15 min intermittent traction 40-70lb, 60 sec hold, 20 sec off- NP  Intermittent manual lumbar traction with strap x10 min  MET for L innominate inferior glide with hip hike x5 min- NP    Home Exercises Provided and Patient Education Provided     Education provided:   - pathophysiology    Written Home Exercises Provided: yes.  Exercises were reviewed and GEORGIA was able to demonstrate them prior to the end of the session.  GEORGIA demonstrated good  understanding of the education provided.     See EMR under Patient Instructions for exercises provided prior visit.    Assessment     Improved core stability with pelvic tilt sequence today. Bridges increased her normal pain by the ending repetitions. Mild core deficits with quadruped leg extensions, improved with cueing and repetition. Continues to report improvements with manual traction and with lumbar extension machine.   GEORGIA is progressing well towards her goals.   Pt prognosis is Good.     Pt will continue to benefit from skilled outpatient physical therapy to address the deficits listed in the problem list box on initial evaluation, provide pt/family education and to maximize pt's level of independence in the home and community environment.     Pt's spiritual, cultural and educational needs considered and pt agreeable to plan of care and goals.    Anticipated barriers to physical therapy: none anticipated    Goals:   Short Term Goals: 4 weeks   - stand with appropriate posture with min cueing and no increase in pain for 10 min (progressing, not met)  - lift 10 # floor to waist with proper technique with min pain with min cueing (progressing, not met)  - have less pain with bed mobility and complete in usual time (progressing, not met)  - pt will be able to perform TA set with LE movement to demonstrate improved lumbar and pelvic stability (progressing, not met)     Long Term Goals: 8 weeks   - pt will  demonstrate improved lumbar ROM with decreased pain symptoms (progressing, not met)  - pt will demonstrate 5/5 LE strength via MMT for improved stability (progressing, not met)  - pt will tolerate standing for full house showing without requiring a rest break for improved work tolerance (progressing, not met)  - pt will report <2/10 pain at worst with ADLs for improved functional tolerance (progressing, not met)    Plan     Continue per POC, addressing manual therapy and strengthening needs as tolerated.    Migdalia Varela, PT

## 2019-08-01 ENCOUNTER — CLINICAL SUPPORT (OUTPATIENT)
Dept: REHABILITATION | Facility: HOSPITAL | Age: 54
End: 2019-08-01
Attending: FAMILY MEDICINE
Payer: COMMERCIAL

## 2019-08-01 ENCOUNTER — OFFICE VISIT (OUTPATIENT)
Dept: PAIN MEDICINE | Facility: CLINIC | Age: 54
End: 2019-08-01
Payer: COMMERCIAL

## 2019-08-01 VITALS
BODY MASS INDEX: 25 KG/M2 | WEIGHT: 155.56 LBS | TEMPERATURE: 97 F | RESPIRATION RATE: 18 BRPM | OXYGEN SATURATION: 98 % | HEIGHT: 66 IN | HEART RATE: 73 BPM | DIASTOLIC BLOOD PRESSURE: 50 MMHG | SYSTOLIC BLOOD PRESSURE: 106 MMHG

## 2019-08-01 DIAGNOSIS — M54.50 CHRONIC BILATERAL LOW BACK PAIN WITHOUT SCIATICA: ICD-10-CM

## 2019-08-01 DIAGNOSIS — M47.816 LUMBAR SPONDYLOSIS: Primary | ICD-10-CM

## 2019-08-01 DIAGNOSIS — M25.69 BACK STIFFNESS: ICD-10-CM

## 2019-08-01 DIAGNOSIS — G89.29 CHRONIC BILATERAL LOW BACK PAIN WITHOUT SCIATICA: ICD-10-CM

## 2019-08-01 PROCEDURE — 97110 THERAPEUTIC EXERCISES: CPT | Mod: PO

## 2019-08-01 PROCEDURE — 99999 PR PBB SHADOW E&M-EST. PATIENT-LVL III: CPT | Mod: PBBFAC,,, | Performed by: ANESTHESIOLOGY

## 2019-08-01 PROCEDURE — 99244 PR OFFICE CONSULTATION,LEVEL IV: ICD-10-PCS | Mod: S$GLB,,, | Performed by: ANESTHESIOLOGY

## 2019-08-01 PROCEDURE — 97140 MANUAL THERAPY 1/> REGIONS: CPT | Mod: PO

## 2019-08-01 PROCEDURE — 99244 OFF/OP CNSLTJ NEW/EST MOD 40: CPT | Mod: S$GLB,,, | Performed by: ANESTHESIOLOGY

## 2019-08-01 PROCEDURE — 99999 PR PBB SHADOW E&M-EST. PATIENT-LVL III: ICD-10-PCS | Mod: PBBFAC,,, | Performed by: ANESTHESIOLOGY

## 2019-08-01 NOTE — PROGRESS NOTES
[facility] New Patient Evaluation    Referred by: Dr. Cuello  Reason for referral: back pain    CC:   Chief Complaint   Patient presents with    Establish Care    Hip Pain    Low-back Pain      Last 3 PDI Scores 8/1/2019   Pain Disability Index (PDI) 9       HPI:   Georgia Gomez is a 54 y.o. female who complains of back pain    Onset: about 3 months  Inciting Event: none  Progression: since onset, pain is stable  Current Pain Score: 4/10  Timing: constant  Quality: sharp  Radiation: no  Associated numbness or weakness: no numbness, no weakness  Exacerbated by: standing  Allievated by: sitting, laying down  Is Pain Level Acceptable?: No    Previous Therapies:  PT/OT: yes, currently  HEP:   Interventions:   Surgery:  Medications:   - NSAIDS: aleve  - MSK Relaxants:   - TCAs:   - SNRIs:   - Topicals:   - Anticonvulsants:  - Opioids:     History:    Current Outpatient Medications:     biotin 5,000 mcg TbDL, Take 1 tablet by mouth once daily. , Disp: , Rfl:     buPROPion (WELLBUTRIN SR) 200 MG SR12, TAKE 1 TABLET(200 MG) BY MOUTH TWICE DAILY CAN FILL 07/31, Disp: 180 tablet, Rfl: 1    CMPD testosterone proprionate 2% in vanicream, Apply topically once daily. Apply 1 gram as directed 2-3 times weekly., Disp: , Rfl:     estradiol 0.05 mg/24 hr td ptsw (VIVELLE-DOT) 0.05 mg/24 hr, Place 1 patch onto the skin twice a week. , Disp: , Rfl:     hydrocortisone (PROCTOZONE-HC) 2.5 % rectal cream, PLACE RECTALLY TWICE DAILY AS DIRECTED, Disp: 30 g, Rfl: 0    multivitamin (ONE DAILY MULTIVITAMIN) per tablet, Take 1 tablet by mouth once daily., Disp: , Rfl:     testosterone 1 % (25 mg/2.5gram) GlPk, testosterone  one pump a day qam, Disp: , Rfl:     vitamin D 1000 units Tab, Take 1,000 Units by mouth once daily., Disp: , Rfl:     Past Medical History:   Diagnosis Date    Arthritis     Headache(784.0)     Rash        Past Surgical History:   Procedure Laterality Date    HYSTERECTOMY  10/2014    uterine  fibroids       Family History   Problem Relation Age of Onset    Diabetes Mother     Hypertension Mother     Cancer Father         lung ca    Heart disease Father     Hyperlipidemia Father     Hypertension Father     Cancer Sister         Non Hodgkin's lymphoma    No Known Problems Daughter     No Known Problems Son        Social History     Socioeconomic History    Marital status:      Spouse name: Not on file    Number of children: Not on file    Years of education: Not on file    Highest education level: Not on file   Occupational History    Not on file   Social Needs    Financial resource strain: Not on file    Food insecurity:     Worry: Not on file     Inability: Not on file    Transportation needs:     Medical: Not on file     Non-medical: Not on file   Tobacco Use    Smoking status: Never Smoker    Smokeless tobacco: Never Used   Substance and Sexual Activity    Alcohol use: Yes     Comment: occ    Drug use: No    Sexual activity: Not on file   Lifestyle    Physical activity:     Days per week: Not on file     Minutes per session: Not on file    Stress: Not on file   Relationships    Social connections:     Talks on phone: Not on file     Gets together: Not on file     Attends [employer] service: Not on file     Active member of club or organization: Not on file     Attends meetings of clubs or organizations: Not on file     Relationship status: Not on file   Other Topics Concern    Are you pregnant or think you may be? Not Asked    Breast-feeding Not Asked   Social History Narrative    She currently works as a .        Review of patient's allergies indicates:  No Known Allergies    Review of Systems:  General ROS: negative for - fever  Psychological ROS: negative for - hostility  Hematological and Lymphatic ROS: negative for - bleeding problems  Endocrine ROS: negative for - unexpected weight changes  Respiratory ROS: no cough, shortness of breath, or  "wheezing  Cardiovascular ROS: no chest pain or dyspnea on exertion  Gastrointestinal ROS: no abdominal pain, change in bowel habits, or black or bloody stools  Musculoskeletal ROS: negative for - muscular weakness  Neurological ROS: negative for - bowel and bladder control changes, impaired coordination/balance or numbness/tingling  Dermatological ROS: negative for rash    Physical Exam:  Vitals:    08/01/19 1548   BP: (!) 106/50   Pulse: 73   Resp: 18   Temp: 97 °F (36.1 °C)   TempSrc: Oral   SpO2: 98%   Weight: 70.5 kg (155 lb 8.6 oz)   Height: 5' 6" (1.676 m)   PainSc:   4   PainLoc: Back     Body mass index is 25.1 kg/m².    Imaging:  MRI lumbar spine 7/23/19  FINDINGS:  Vertebral column: The vertebral bodies maintain normal height.  Question of minimal superior endplate compression deformity of L1 on plain films was likely related to positioning.  There is no acute or chronic fracture.  There is mild disc space narrowing anteriorly at the L1-2 and L2-3 levels.  There is a small chronic Schmorl's node in the anterior inferior endplate of L2.  There is a similar small chronic anterior inferior endplate Schmorl's node at L5.  There is mild anterior endplate osteophyte formation most apparent at the L1-2 and L2-3 levels.  Baseline marrow signal is normal.    Spinal canal, conus, epidural space: The spinal canal is developmentally normal.  The conus terminates at the level of L1 and is normal in contour and signal intensity.  There is no abnormal epidural soft tissue mass or fluid collection.    Findings by level:    On the sagittal images, the T11-12 level is normal.    T12-L1: There is a minimal disc bulge.  There is no spinal canal or significant foraminal stenosis.  L1-2: There is a mild diffuse disc bulge with subtle right posterolateral dorsal fissure.  There is no spinal canal or significant foraminal stenosis.  L2-3: There is a mild disc bulge with subtle dorsal annular fissure.  There is no spinal canal or " significant foraminal stenosis.  L3-4: There is a mild disc bulge and mild facet joint arthropathy.  There is a subtle dorsal annular fissure.  There is no spinal canal or significant foraminal stenosis.  L4-5: There is a diffuse disc bulge with osteophytic ridging slightly eccentric to the right.  There is mild-to-moderate facet joint arthropathy with ligamentum flavum thickening.  There is no spinal stenosis.  There is mild crowding of the left lateral recess.  There is mild bilateral foraminal stenosis.  L5-S1: There is a mild disc bulge with osteophytic ridging eccentric to the right.  There is mild-to-moderate facet joint arthropathy.  There is a small facet joint effusion bilaterally.  There is no spinal stenosis.  There is moderate bilateral foraminal stenosis.    Soft tissues, other: The prevertebral soft tissues are normal.  The aorta is normal in caliber.  There are round T2 hyperintense lesions in the right kidney likely representing cyst in the superior pole but incompletely included and evaluated on the study.    Labs:  BMP  Lab Results   Component Value Date     09/06/2018    K 4.0 09/06/2018     09/06/2018    CO2 27 09/06/2018    BUN 22 (H) 09/06/2018    CREATININE 0.8 09/06/2018    CALCIUM 9.9 09/06/2018    ANIONGAP 6 (L) 09/06/2018    ESTGFRAFRICA >60.0 09/06/2018    EGFRNONAA >60.0 09/06/2018     Lab Results   Component Value Date    ALT 37 09/06/2018    AST 30 09/06/2018    ALKPHOS 70 09/06/2018    BILITOT 0.4 09/06/2018       Assessment:  Problem List Items Addressed This Visit        Neuro    Lumbar spondylosis - Primary       Orthopedic    Chronic bilateral low back pain without sciatica          Treatment Plan:  54 y.o. year old female with PMH JUSTIN on CPAP, depression presents to the office with back pain.  She has had back pain since the beginning of May and it has been stable.  She denies any previous lumbar surgery or injections.  Today her pain is 4/10, constant, sharp, in her  lower back.  She denies any radicular pain or radiculopathy.  Her pain is worse with standing and back extension and relieved with sitting and laying down.  She is currently doing PT and has done 2 sessions so far.  She has also tried aleve with only mild relief.  On exam 5/5 strength b/l LE's, 2+ b/l patella 0/0 b/l achilles, + b/l axial facet loading, - SLR.  MRI lumbar spine is consistent with multilevel facet arthropathy, subtle dorsal annular fissure at L3-4 and L4-5 diffuse disc bulge with osteophytic ridging slightly eccentric to the right.  I think her primary pain generator is related to her lumbar facet arthropathy, although she may have some small contribution of discogenic pain.  Discussed treatment options of continued conservative care vs interventions such as lumbar medial branch block.  At this time she would like the continue conservative care with PT and NSAIDs.  She will call and follow up with her pain does not improve or worsens.    Procedures: Discussed treatment options of continued conservative care vs interventions such as lumbar medial branch block.  At this time she would like the continue conservative care with PT and NSAIDs.  PT/OT/HEP: continue PT and HEP  Medications: continue aleve prn  Labs: Reviewed and medications are appropriately dosed for current hepatorenal function.  Imaging: No additional recommended at this time.    : Not applicable    Oz Pizano M.D.  Interventional Pain Medicine / Anesthesiology

## 2019-08-01 NOTE — PROGRESS NOTES
"  Physical Therapy Daily Treatment Note     Name: Kwaku Gomez  Clinic Number: [id number]    Therapy Diagnosis:   Encounter Diagnoses   Name Primary?    Back stiffness     Chronic bilateral low back pain without sciatica      Physician: Grace Cuello MD    Visit Date: 8/1/2019  Physician Orders: PT Eval and Treat   Medical Diagnosis from Referral: M54.5,G89.29 (ICD-10-CM) - Chronic bilateral low back pain without sciatica  Evaluation Date: 7/18/2019  Authorization Period Expiration: 12/31/19  Plan of Care Expiration: 9/13/19  Visit # / Visits authorized: 4/ 30    Time In: 8:10 am  Time Out: 9:10 am  Total Billable Time: 55 minutes    Precautions: Standard    Subjective     Pt reports: she has less pain in her hips and is just in the low back today. She states that she does not have as much pn at night with rolling over in bed.  She was compliant with home exercise program.  Response to previous treatment: no adverse effects  Functional change: none yet    Pain: 3/10  Location: left back  and buttocks      Objective     KWAKU received therapeutic exercises to develop strength, endurance, ROM, flexibility and core stabilization for 45 minutes including:  Recumbent bike x5 min  Positional traction with red stability ball with heat x7 min  Prone lying x3 min  TA set 20x5"  TA set with hooklying marching x10 each  TA set with hip adduction squeeze 2x10  LTR 3 x 20 sec each pn free range only  DL bridge 2x10  Quadruped LE extension x10 each  MedX lumbar extension x15 40#  Supine knee to opposite shoulder stretch 3x30 sec each  Sidelying clamshell 2x10 each  Standing TA set with red stability ball 20x5"  Standing palloff press tandem stance x20 each- NP  Lateral monster walks yellow theraband around knees x50' each-NP    Kwaku received 10 min manual therapy, including:  15 min intermittent traction 40-70lb, 60 sec hold, 20 sec off- NP  Intermittent manual lumbar traction with strap x10 min  MET for L innominate " inferior glide with hip hike x5 min- NP    Home Exercises Provided and Patient Education Provided     Education provided:   - pathophysiology    Written Home Exercises Provided: yes.  Exercises were reviewed and KWAKU was able to demonstrate them prior to the end of the session.  KWAKU demonstrated good  understanding of the education provided.     See EMR under Patient Instructions for exercises provided prior visit.    Assessment     Improved core stability with pelvic tilt sequence today able to initiate and maintain.  Mild core deficits with quadruped leg extensions, improved with cueing and repetition. Continues to report improvements with manual traction and with lumbar extension machine.   KWAKU is progressing well towards her goals.   Pt prognosis is Good.     Pt will continue to benefit from skilled outpatient physical therapy to address the deficits listed in the problem list box on initial evaluation, provide pt/family education and to maximize pt's level of independence in the home and community environment.     Pt's spiritual, cultural and educational needs considered and pt agreeable to plan of care and goals.    Anticipated barriers to physical therapy: none anticipated    Goals:   Short Term Goals: 4 weeks   - stand with appropriate posture with min cueing and no increase in pain for 10 min (progressing, not met)  - lift 10 # floor to waist with proper technique with min pain with min cueing (progressing, not met)  - have less pain with bed mobility and complete in usual time (progressing, not met)  - pt will be able to perform TA set with LE movement to demonstrate improved lumbar and pelvic stability (progressing, not met)     Long Term Goals: 8 weeks   - pt will demonstrate improved lumbar ROM with decreased pain symptoms (progressing, not met)  - pt will demonstrate 5/5 LE strength via MMT for improved stability (progressing, not met)  - pt will tolerate standing for full house showing  without requiring a rest break for improved work tolerance (progressing, not met)  - pt will report <2/10 pain at worst with ADLs for improved functional tolerance (progressing, not met)    Plan     Continue per POC, addressing manual therapy and strengthening needs as tolerated.    Sridhar Howard, PTA

## 2019-08-01 NOTE — LETTER
August 1, 2019      Grace Cuello MD  [address]  [place] [address]           [place] [facility]  [address] [facility] [address]  [facility] [address]  Phone: [phone]  Fax: [phone]          Patient: Georgia Gomez   MR Number: [id number]   YOB: 1965   Date of Visit: 8/1/2019       Dear Dr. Grace Cuello:    Thank you for referring Georgia Gomez to me for evaluation. Attached you will find relevant portions of my assessment and plan of care.    If you have questions, please do not hesitate to call me. I look forward to following Georgia Gomez along with you.    Sincerely,    Oz Pizano MD    Enclosure  CC:  No Recipients    If you would like to receive this communication electronically, please contact [email or web address] or [phone] to request more information on [employer] Link access.    For providers and/or their staff who would like to refer a patient to [facility], please contact us through our one-stop-shop provider referral line, [facility], at [phone].    If you feel you have received this communication in error or would no longer like to receive these types of communications, please e-mail [email or web address]

## 2019-08-05 ENCOUNTER — CLINICAL SUPPORT (OUTPATIENT)
Dept: REHABILITATION | Facility: HOSPITAL | Age: 54
End: 2019-08-05
Payer: COMMERCIAL

## 2019-08-05 DIAGNOSIS — G89.29 CHRONIC BILATERAL LOW BACK PAIN WITHOUT SCIATICA: ICD-10-CM

## 2019-08-05 DIAGNOSIS — M25.69 BACK STIFFNESS: ICD-10-CM

## 2019-08-05 DIAGNOSIS — M54.50 CHRONIC BILATERAL LOW BACK PAIN WITHOUT SCIATICA: ICD-10-CM

## 2019-08-05 PROCEDURE — 97110 THERAPEUTIC EXERCISES: CPT | Mod: PO

## 2019-08-05 PROCEDURE — 97140 MANUAL THERAPY 1/> REGIONS: CPT | Mod: PO

## 2019-08-05 NOTE — PROGRESS NOTES
"  Physical Therapy Daily Treatment Note     Name: Kwaku Gomez  Clinic Number: [id number]    Therapy Diagnosis:   Encounter Diagnoses   Name Primary?    Back stiffness     Chronic bilateral low back pain without sciatica      Physician: Grace Cuello MD    Visit Date: 8/5/2019  Physician Orders: PT Eval and Treat   Medical Diagnosis from Referral: M54.5,G89.29 (ICD-10-CM) - Chronic bilateral low back pain without sciatica  Evaluation Date: 7/18/2019  Authorization Period Expiration: 12/31/19  Plan of Care Expiration: 9/13/19  Visit # / Visits authorized: 5/ 30    Time In: 8:00 am  Time Out: 9:00 am  Total Billable Time: 60 minutes    Precautions: Standard    Subjective     Pt reports: she saw Dr. Pizano last week, and is not going to try any injections yet. She wants to continue therapy, as she is seeing improvements, and her pain is not limiting work significantly. Her pain continues to be in the low back now rather than into the hips  She was compliant with home exercise program.  Response to previous treatment: no adverse effects  Functional change: none yet    Pain: 3/10  Location: left back  and buttocks      Objective     KWAKU received therapeutic exercises to develop strength, endurance, ROM, flexibility and core stabilization for 50 minutes including:  Recumbent bike x5 min  Positional traction with red stability ball with heat x7 min  Prone lying x3 min  TA set 20x5"  TA set with hooklying marching x10 each  TA set with hip adduction squeeze 2x10  LTR 3 x 20 sec each pn free range only  DL bridge 2x10  Quadruped LE extension x10 each  Supine knee to opposite shoulder stretch 3x30 sec each  Sidelying clamshell 2x10 each  Standing TA set with red stability ball 20x5"  MedX lumbar extension x15 40#  Seated on stability ball with TA set and marching x10 each  Standing red theraband palloff press tandem stance x20 each  Lateral monster walks yellow theraband around knees x50' each-NP    Kwaku received " 10 min manual therapy, including:  15 min intermittent traction 40-70lb, 60 sec hold, 20 sec off- NP  Intermittent manual lumbar traction with strap x10 min  MET for L innominate inferior glide with hip hike x5 min- NP    Home Exercises Provided and Patient Education Provided     Education provided:   - pathophysiology    Written Home Exercises Provided: yes.  Exercises were reviewed and KWAKU was able to demonstrate them prior to the end of the session.  KWAKU demonstrated good  understanding of the education provided.     See EMR under Patient Instructions for exercises provided prior visit.    Assessment     Continued mild difficulty with bed mobility due to pain. Demonstrated improved overall core stability, but difficulty with stability ball marching. Will continue to focus on core strengthening and lumbar mobility for decreased symptoms.   KWAKU is progressing well towards her goals.   Pt prognosis is Good.     Pt will continue to benefit from skilled outpatient physical therapy to address the deficits listed in the problem list box on initial evaluation, provide pt/family education and to maximize pt's level of independence in the home and community environment.     Pt's spiritual, cultural and educational needs considered and pt agreeable to plan of care and goals.    Anticipated barriers to physical therapy: none anticipated    Goals:   Short Term Goals: 4 weeks   - stand with appropriate posture with min cueing and no increase in pain for 10 min (progressing, not met)  - lift 10 # floor to waist with proper technique with min pain with min cueing (progressing, not met)  - have less pain with bed mobility and complete in usual time (progressing, not met)  - pt will be able to perform TA set with LE movement to demonstrate improved lumbar and pelvic stability (progressing, not met)     Long Term Goals: 8 weeks   - pt will demonstrate improved lumbar ROM with decreased pain symptoms (progressing, not  met)  - pt will demonstrate 5/5 LE strength via MMT for improved stability (progressing, not met)  - pt will tolerate standing for full house showing without requiring a rest break for improved work tolerance (progressing, not met)  - pt will report <2/10 pain at worst with ADLs for improved functional tolerance (progressing, not met)    Plan     Continue per POC, addressing manual therapy and strengthening needs as tolerated.    Migdalia Varela, PT

## 2019-08-07 ENCOUNTER — CLINICAL SUPPORT (OUTPATIENT)
Dept: REHABILITATION | Facility: HOSPITAL | Age: 54
End: 2019-08-07
Attending: FAMILY MEDICINE
Payer: COMMERCIAL

## 2019-08-07 DIAGNOSIS — M25.69 BACK STIFFNESS: ICD-10-CM

## 2019-08-07 DIAGNOSIS — G89.29 CHRONIC BILATERAL LOW BACK PAIN WITHOUT SCIATICA: ICD-10-CM

## 2019-08-07 DIAGNOSIS — M54.50 CHRONIC BILATERAL LOW BACK PAIN WITHOUT SCIATICA: ICD-10-CM

## 2019-08-07 PROCEDURE — 97110 THERAPEUTIC EXERCISES: CPT | Mod: PO

## 2019-08-07 NOTE — PROGRESS NOTES
"  Physical Therapy Daily Treatment Note     Name: Kwaku Gomez  Clinic Number: [id number]    Therapy Diagnosis:   Encounter Diagnoses   Name Primary?    Back stiffness     Chronic bilateral low back pain without sciatica      Physician: Grace Cuello MD    Visit Date: 8/7/2019  Physician Orders: PT Eval and Treat   Medical Diagnosis from Referral: M54.5,G89.29 (ICD-10-CM) - Chronic bilateral low back pain without sciatica  Evaluation Date: 7/18/2019  Authorization Period Expiration: 12/31/19  Plan of Care Expiration: 9/13/19  Visit # / Visits authorized: 6/ 30    Time In: 7:03 am  Time Out: 8:00 am  Total Billable Time: 57 minutes    Precautions: Standard    Subjective     Pt reports: she is having a little right hip pain since last evening.   She was compliant with home exercise program.  Response to previous treatment: no adverse effects  Functional change: none yet    Pain: 3/10  Location: left back  and buttocks      Objective     KWAKU received therapeutic exercises to develop strength, endurance, ROM, flexibility and core stabilization for 60 minutes including:  Recumbent bike x5 min  Positional traction with red stability ball with heat x5 min  Prone lying x3 min  TA set 20x5"  TA set with hooklying marching x10 each  TA with SLR x10 each  TA set with hip adduction squeeze 2x10- NP  LTR x10 each pn free range only  DL bridge 2x10  Stability ball bridge 2x10  Stability ball DKTC 20x5"  Quadruped LE extension x10 each  Supine knee to opposite shoulder stretch 3x30 sec each  Sidelying clamshell 2x10 each red theraband   Standing TA set with red stability ball 20x5"  MedX lumbar extension x15 40#  Seated on stability ball with TA set and marching x10 each with UE support, x10 each without UE support  Standing red theraband palloff press tandem stance x20 each  Lateral monster walks yellow theraband around knees x50' each-NP  Gentle yoga poses- warrior 2, crescent; with cueing for hip ER and posterior " pelvic tilt x5 min    [place] received 10 min manual therapy, including:- NP  15 min intermittent traction 40-70lb, 60 sec hold, 20 sec off- NP  Intermittent manual lumbar traction with strap x10 min  MET for L innominate inferior glide with hip hike x5 min- NP    Home Exercises Provided and Patient Education Provided     Education provided:   - pathophysiology    Written Home Exercises Provided: yes.  Exercises were reviewed and GEORGIA was able to demonstrate them prior to the end of the session.  GEORGIA demonstrated good  understanding of the education provided.     See EMR under Patient Instructions for exercises provided prior visit.    Assessment     Tolerated activities well. Poor balance noted with tandem stance palloff press. Pt asked about trialing yoga for balance and strengthening. Started education on proper positioning with poses, and about options for beginner yoga classes in the area.   GEORGIA is progressing well towards her goals.   Pt prognosis is Good.     Pt will continue to benefit from skilled outpatient physical therapy to address the deficits listed in the problem list box on initial evaluation, provide pt/family education and to maximize pt's level of independence in the home and community environment.     Pt's spiritual, cultural and educational needs considered and pt agreeable to plan of care and goals.    Anticipated barriers to physical therapy: none anticipated    Goals:   Short Term Goals: 4 weeks   - stand with appropriate posture with min cueing and no increase in pain for 10 min (progressing, not met)  - lift 10 # floor to waist with proper technique with min pain with min cueing (progressing, not met)  - have less pain with bed mobility and complete in usual time (progressing, not met)  - pt will be able to perform TA set with LE movement to demonstrate improved lumbar and pelvic stability (progressing, not met)     Long Term Goals: 8 weeks   - pt will demonstrate improved  lumbar ROM with decreased pain symptoms (progressing, not met)  - pt will demonstrate 5/5 LE strength via MMT for improved stability (progressing, not met)  - pt will tolerate standing for full house showing without requiring a rest break for improved work tolerance (progressing, not met)  - pt will report <2/10 pain at worst with ADLs for improved functional tolerance (progressing, not met)    Plan     Continue per POC, addressing manual therapy and strengthening needs as tolerated.    Migdalia Varela, PT

## 2019-08-12 ENCOUNTER — CLINICAL SUPPORT (OUTPATIENT)
Dept: REHABILITATION | Facility: HOSPITAL | Age: 54
End: 2019-08-12
Attending: FAMILY MEDICINE
Payer: COMMERCIAL

## 2019-08-12 DIAGNOSIS — G89.29 CHRONIC BILATERAL LOW BACK PAIN WITHOUT SCIATICA: ICD-10-CM

## 2019-08-12 DIAGNOSIS — M54.50 CHRONIC BILATERAL LOW BACK PAIN WITHOUT SCIATICA: ICD-10-CM

## 2019-08-12 DIAGNOSIS — M25.69 BACK STIFFNESS: ICD-10-CM

## 2019-08-12 PROCEDURE — 97110 THERAPEUTIC EXERCISES: CPT | Mod: PO

## 2019-08-12 PROCEDURE — 97140 MANUAL THERAPY 1/> REGIONS: CPT | Mod: PO

## 2019-08-12 NOTE — PROGRESS NOTES
"  Physical Therapy Daily Treatment Note     Name: Kwaku Gomez  Clinic Number: [id number]    Therapy Diagnosis:   Encounter Diagnoses   Name Primary?    Back stiffness     Chronic bilateral low back pain without sciatica      Physician: Grace Cuello MD    Visit Date: 8/12/2019  Physician Orders: PT Eval and Treat   Medical Diagnosis from Referral: M54.5,G89.29 (ICD-10-CM) - Chronic bilateral low back pain without sciatica  Evaluation Date: 7/18/2019  Authorization Period Expiration: 12/31/19  Plan of Care Expiration: 9/13/19  Visit # / Visits authorized: 6/ 30    Time In: 7:03 am  Time Out: 8:00 am  Total Billable Time: 57 minutes    Precautions: Standard    Subjective     Pt reports: she is feeling pretty good. She was able to get a hot pack for home, and has been using it with good results. She continues to feel better if she stands with one foot on a step when she gets ready.   She was compliant with home exercise program.  Response to previous treatment: no adverse effects  Functional change: none yet    Pain: 2/10  Location: left back and buttocks      Objective     KWAKU received therapeutic exercises to develop strength, endurance, ROM, flexibility and core stabilization for 50 minutes including:  Recumbent bike x5 min  Positional traction with red stability ball with heat x3 min  TA set 20x5"- d/c to HEP  TA set with hooklying marching 2x10 each  TA with SLR x10 each  TA set with hip adduction squeeze 2x10- NP  LTR x10 on red stability ball  DL bridge 2x10  Stability ball bridge 2x10  Stability ball DKTC 20x5"  Prone lying x3 min  Quadruped LE extension x10 each  Supine knee to opposite shoulder stretch 3x30 sec each  Sidelying clamshell 2x10 each red theraband   Standing TA set with red stability ball 20x5"  MedX lumbar extension 2x10 50#  Seated on stability ball with TA set and marching x10 each with UE support, x10 each without UE support  Standing red theraband palloff press tandem stance x20 " each  SLS on ground with green med ball throws x20 each  Lateral monster walks red theraband around knees x50' each  Gentle yoga poses- warrior 2, crescent; with cueing for hip ER and posterior pelvic tilt x5 min- NP    [place] received 10 min manual therapy, including:  15 min intermittent traction 40-70lb, 60 sec hold, 20 sec off- NP  Intermittent manual lumbar traction with strap x10 min-NP  MET for L innominate inferior glide with hip hike x5 min- NP  Stick massage to L IT band and glutes x10 min    Home Exercises Provided and Patient Education Provided     Education provided:   - pathophysiology    Written Home Exercises Provided: yes.  Exercises were reviewed and [place]IA was able to demonstrate them prior to the end of the session.  GEORGIA demonstrated good  understanding of the education provided.     See EMR under Patient Instructions for exercises provided prior visit.    Assessment     Difficulty with single leg balance with med ball throws, improved with practice. Tried to perform SL RDL cone taps, but pt reported point lateral hip pain, likely from IT band rubbing over greater trochanter bursa. 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   GEORGIA is progressing well towards her goals.   Pt prognosis is Good.     Pt will continue to benefit from skilled outpatient physical therapy to address the deficits listed in the problem list box on initial evaluation, provide pt/family education and to maximize pt's level of independence in the home and community environment.     Pt's spiritual, cultural and educational needs considered and pt agreeable to plan of care and goals.    Anticipated barriers to physical therapy: none anticipated    Goals:   Short Term Goals: 4 weeks   - stand with appropriate posture with min cueing and no increase in pain for 10 min (progressing, not met)  - lift 10 # floor to waist with proper technique with min pain  with min cueing (progressing, not met)  - have less pain with bed mobility and complete in usual time (progressing, not met)  - pt will be able to perform TA set with LE movement to demonstrate improved lumbar and pelvic stability (progressing, not met)     Long Term Goals: 8 weeks   - pt will demonstrate improved lumbar ROM with decreased pain symptoms (progressing, not met)  - pt will demonstrate 5/5 LE strength via MMT for improved stability (progressing, not met)  - pt will tolerate standing for full house showing without requiring a rest break for improved work tolerance (progressing, not met)  - pt will report <2/10 pain at worst with ADLs for improved functional tolerance (progressing, not met)    Plan     Continue per POC, addressing manual therapy and strengthening needs as tolerated.    Migdalia Varela, PT

## 2019-08-14 ENCOUNTER — CLINICAL SUPPORT (OUTPATIENT)
Dept: REHABILITATION | Facility: HOSPITAL | Age: 54
End: 2019-08-14
Attending: FAMILY MEDICINE
Payer: COMMERCIAL

## 2019-08-14 DIAGNOSIS — M54.50 CHRONIC BILATERAL LOW BACK PAIN WITHOUT SCIATICA: ICD-10-CM

## 2019-08-14 DIAGNOSIS — G89.29 CHRONIC BILATERAL LOW BACK PAIN WITHOUT SCIATICA: ICD-10-CM

## 2019-08-14 DIAGNOSIS — M25.69 BACK STIFFNESS: ICD-10-CM

## 2019-08-14 PROCEDURE — 97110 THERAPEUTIC EXERCISES: CPT | Mod: PO

## 2019-08-14 PROCEDURE — 97140 MANUAL THERAPY 1/> REGIONS: CPT | Mod: PO

## 2019-08-14 NOTE — PLAN OF CARE
[facility]  Physical Therapy Re-Assessment    Name: Georgia Gomez  Clinic Number: [id number]    Therapy Diagnosis:   Encounter Diagnoses   Name Primary?    Back stiffness     Chronic bilateral low back pain without sciatica      Physician: Grace Cuello MD    Physician Orders: PT Eval and Treat   Medical Diagnosis from Referral: M54.5,G89.29 (ICD-10-CM) - Chronic bilateral low back pain without sciatica  Evaluation Date: 8/14/2019  Authorization Period Expiration: 12/31/19  Plan of Care Expiration: 9/13/19  Visit # / Visits authorized: 9/ 30    Time In: 7:05 am  Time Out: 8:00 am  Total Billable Time: 55 minutes    Precautions: Standard    Subjective   Since starting therapy, she is overall feeling better. The pain does not usually extend as far into her hips anymore, and is more localized to the low back. She can now turn in bed without pain, and gets up in the morning without pain. She has an easier time getting ready in the morning, doing her hair/makeup without pain, as long as she puts one foot on a stool.      Medical History:   Past Medical History:   Diagnosis Date    Arthritis     Headache(784.0)     Rash        Surgical History:   Georgia Gomez  has a past surgical history that includes Hysterectomy (10/2014).    Medications:   Georgia has a current medication list which includes the following prescription(s): biotin, bupropion, testosterone, estradiol 0.05 mg/24 hr td ptsw, hydrocortisone, multivitamin, testosterone, and vitamin d.    Allergies:   Review of patient's allergies indicates:  No Known Allergies     Imaging, x-ray: 1. Degenerative change of the lumbar spine.  Given the provided history of sciatica, consideration should be given to performing MRI of the lumbar spine.  2. Possible age-indeterminate superior endplate compression deformity at L1.  This is of uncertain clinical relevance given the patient's symptoms in the lower lumbar spine.  Additional  evaluation with MRI could provide additional diagnostic information as deemed clinically appropriate.    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, a lot of sitting; had bought standing desk but sits now more due to pain  Prior Level of Function: no difficulty with ADLs  Current Level of Function: pain with brushing teeth, anything standing, getting dressed; shopping    Pain:  Current 2/10, worst 5/10 with movement after standing, best 2/10   Location: bilateral hips and midline spine   Description: stabbing  Aggravating Factors: Standing and Morning  Easing Factors: advil, ice    Pts goals: not have the pain    Objective     Observation: no acute distress; changes positions often    Posture:  fair    Lumbar Range of Motion:    Degrees Pain   Flexion To toes   -        Extension 50% of normal   +        Left Side Bending To joint line +        Right Side Bending To distal thigh +        Left rotation   50% of normal + R hip        Right Rotation   75% of normal + L hip             Lower Extremity Strength  Right LE  Left LE    Knee extension: 5/5 Knee extension: 5/5   Knee flexion: 5/5 Knee flexion: 5/5   Hip flexion: 5/5 Hip flexion: 5/5   Hip extension:  5/5 Hip extension: 5/5   Hip abduction: 5/5 Hip abduction: 5/5   Hip adduction: 5/5 Hip adduction 5/5   Ankle dorsiflexion: 5/5 Ankle dorsiflexion: 5/5   Ankle plantarflexion: 5/5 Ankle plantarflexion: 5/5         Special Tests:  -Repeated Flexion: I, W (R and LBP)  -Repeated Ext: I, W (R and LBP)  -prone lying: no pain in position  -Bridge Test: + pain  -Leg Length: B: 89 cm  -SI compression, distraction: -  -Colin: + B    Neuro Dynamic Testing:    Sciatic nerve:      SLR: R = -     L = -        Palpation: tenderness to palpation at  bilateral PSIS    Sensation: no sensation deficits        CMS Impairment/Limitation/Restriction for FOTO Lumbar Spine Survey    Therapist reviewed FOTO scores for Georgia Castillote on 8/14/2019.   FOTO documents entered into [employer] - see Media section.    Limitation Score: 17%  Category: Mobility         TREATMENT   See daily note    Assessment   Georgia is a 54 y.o. female referred to outpatient Physical Therapy with a medical diagnosis of chronic bilateral low back pain without sciatica. Pt presents with decreased lumbar ROM, decreased hip and core stability, decreased tolerance for functional mobility, and pelvic malalignment. She would benefit from skilled PT services to improve ROM, strength, and functional mobility for return to prior level of function. She has demonstrated improved tolerance to lumbar ROM, improved bed mobility, improved LE and core stability, and improved tolerance for ADLs and work activities. She will continue to benefit from skilled PT services for return to prior level of activity.     Pt prognosis is Good.   Pt will benefit from skilled outpatient Physical Therapy to address the deficits stated above and in the chart below, provide pt/family education, and to maximize pt's level of independence.     Plan of care discussed with patient: Yes  Pt's spiritual, cultural and educational needs considered and patient is agreeable to the plan of care and goals as stated below:     Anticipated Barriers for therapy: none anticipated    Medical Necessity is demonstrated by the following  History  Co-morbidities and personal factors that may impact the plan of care Co-morbidities:   depression, JUSTIN    Personal Factors:   no deficits     moderate   Examination  Body Structures and Functions, activity limitations and participation restrictions that may impact the plan of care Body Regions:   back  lower extremities    Body Systems:    gross symmetry  ROM  strength  gross coordinated  movement  balance  gait  transfers  transitions  motor control  motor learning    Participation Restrictions:   Unable to participate in ADLs    Activity limitations:   Learning and applying knowledge  no deficits    General Tasks and Commands  no deficits    Communication  no deficits    Mobility  lifting and carrying objects  walking    Self care  washing oneself (bathing, drying, washing hands)  caring for body parts (brushing teeth, shaving, grooming)  dressing    Domestic Life  shopping  cooking  doing house work (cleaning house, washing dishes, laundry)    Interactions/Relationships  no deficits    Life Areas  employment    Community and Social Life  community life  recreation and leisure         moderate   Clinical Presentation evolving clinical presentation with changing clinical characteristics moderate   Decision Making/ Complexity Score: moderate     Goals:  Short Term Goals: 4 weeks   - stand with appropriate posture with min cueing and no increase in pain for 10 min. Met 8/14/19.   - lift 10 # floor to waist with proper technique with min pain with min cueing. Met 8/14/19.   - have less pain with bed mobility and complete in usual time. Met 8/14/19.   - pt will be able to perform TA set with LE movement to demonstrate improved lumbar and pelvic stability. Met 8/14/19.     Long Term Goals: 8 weeks   - pt will demonstrate improved lumbar ROM with decreased pain symptoms. Met 8/14/19.   - pt will demonstrate 5/5 LE strength via MMT for improved stability. Met 8/14/19.   - pt will tolerate standing for full house showing without requiring a rest break for improved work tolerance. (progressing, not met)  - pt will report <2/10 pain at worst with ADLs for improved functional tolerance (progressing, not met)    Plan   Plan of care Certification: 8/14/2019 to 9/13/19.    Outpatient Physical Therapy 2 times weekly for 8 weeks to include the following interventions: Aquatic Therapy, Electrical Stimulation for  pain control, Gait Training, Manual Therapy, Moist Heat/ Ice, Neuromuscular Re-ed, Patient Education, Therapeutic Activites, Therapeutic Exercise and dry needling.     Migdalia Varela, PT

## 2019-08-14 NOTE — PROGRESS NOTES
"  Physical Therapy Daily Treatment Note     Name: Kwaku Gomez  Clinic Number: [id number]    Therapy Diagnosis:   Encounter Diagnoses   Name Primary?    Back stiffness     Chronic bilateral low back pain without sciatica      Physician: Grace Cuello MD    Visit Date: 8/14/2019  Physician Orders: PT Eval and Treat   Medical Diagnosis from Referral: M54.5,G89.29 (ICD-10-CM) - Chronic bilateral low back pain without sciatica  Evaluation Date: 7/18/2019  Authorization Period Expiration: 12/31/19  Plan of Care Expiration: 9/13/19  Visit # / Visits authorized: 7/ 30    Time In: 7:05 am  Time Out: 8:00 am  Total Billable Time: 55 minutes    Precautions: Standard    Subjective     Pt reports: She is feeling good, with only minimal pain throughout the day.   She was compliant with home exercise program.  Response to previous treatment: no adverse effects  Functional change: none yet    Pain: 2/10  Location: left back and buttocks      Objective     KWAKU received therapeutic exercises to develop strength, endurance, ROM, flexibility and core stabilization for 45 minutes including:  Recumbent bike x5 min  Positional traction with red stability ball with heat x3 min- NP due to time  TA set with hooklying marching 2x10 each  TA with SLR x10 each  Dead bug heel taps x10 each  TA set with hip adduction squeeze 2x10- NP  LTR x10 on red stability ball  Stability ball bridge 2x10  Stability ball DKTC 20x5"  SL bridge 2x10- NP  Prone lying x3 min  Bird dog x10 each  Supine knee to opposite shoulder stretch 3x30 sec each  Sidelying clamshell 2x10 each green theraband   Sidelying hip abduction with towel roll under trunk 2x10  Standing TA set with red stability ball 20x5"- NP  MedX lumbar extension 2x10 50#- NP  Seated on stability ball with TA set and marching x10 each with UE support, x10 each without UE support- NP  Standing red theraband palloff press tandem stance x20 each- NP  SLS on ground with green med ball throws x20 " each- NP  Lateral monster walks red theraband around knees x50' each- NP  Gentle yoga poses- warrior 2, crescent; with cueing for hip ER and posterior pelvic tilt x5 min- NP    [place] received 10 min manual therapy, including:  15 min intermittent traction 40-70lb, 60 sec hold, 20 sec off- NP  Intermittent manual lumbar traction with strap x10 min  MET for L innominate inferior glide with hip hike x5 min- NP  Stick massage to L IT band and glutes x10 min- NP    Home Exercises Provided and Patient Education Provided     Education provided:   - pathophysiology    Written Home Exercises Provided: yes.  Exercises were reviewed and [place]IA was able to demonstrate them prior to the end of the session.  GEOR[place]IA demonstrated good  understanding of the education provided.     See EMR under Patient Instructions for exercises provided prior visit.    Assessment     Trialed SL bridge with inability to perform in painfree range on either side. Unable to perform all exercises due to time constraints from re-assessment. Improved core stability with bird dog exercise, but continues to report hip pain with exercises.   GEORGIA is progressing well towards her goals.   Pt prognosis is Good.     Pt will continue to benefit from skilled outpatient physical therapy to address the deficits listed in the problem list box on initial evaluation, provide pt/family education and to maximize pt's level of independence in the home and community environment.     Pt's spiritual, cultural and educational needs considered and pt agreeable to plan of care and goals.    Anticipated barriers to physical therapy: none anticipated    Goals:   Short Term Goals: 4 weeks   - stand with appropriate posture with min cueing and no increase in pain for 10 min. Met 8/14/19.   - lift 10 # floor to waist with proper technique with min pain with min cueing. Met 8/14/19.   - have less pain with bed mobility and complete in usual time. Met 8/14/19.   - pt will be  able to perform TA set with LE movement to demonstrate improved lumbar and pelvic stability . Met 8/14/19.      Long Term Goals: 8 weeks   - pt will demonstrate improved lumbar ROM with decreased pain symptoms. Met 8/14/19.   - pt will demonstrate 5/5 LE strength via MMT for improved stability. Met 8/14/19.   - pt will tolerate standing for full house showing without requiring a rest break for improved work tolerance (progressing, not met)  - pt will report <2/10 pain at worst with ADLs for improved functional tolerance (progressing, not met)    Plan     Continue per POC, addressing manual therapy and strengthening needs as tolerated.    Migdalia Varela, PT

## 2019-08-19 ENCOUNTER — CLINICAL SUPPORT (OUTPATIENT)
Dept: REHABILITATION | Facility: HOSPITAL | Age: 54
End: 2019-08-19
Attending: FAMILY MEDICINE
Payer: COMMERCIAL

## 2019-08-19 DIAGNOSIS — M25.69 BACK STIFFNESS: ICD-10-CM

## 2019-08-19 DIAGNOSIS — G89.29 CHRONIC BILATERAL LOW BACK PAIN WITHOUT SCIATICA: ICD-10-CM

## 2019-08-19 DIAGNOSIS — M54.50 CHRONIC BILATERAL LOW BACK PAIN WITHOUT SCIATICA: ICD-10-CM

## 2019-08-19 PROCEDURE — 97110 THERAPEUTIC EXERCISES: CPT | Mod: PO

## 2019-08-19 PROCEDURE — 97140 MANUAL THERAPY 1/> REGIONS: CPT | Mod: PO

## 2019-08-19 NOTE — PROGRESS NOTES
"  Physical Therapy Daily Treatment Note     Name: Kwaku Gomez  Clinic Number: [id number]    Therapy Diagnosis:   Encounter Diagnoses   Name Primary?    Back stiffness     Chronic bilateral low back pain without sciatica      Physician: Grace Cuello MD    Visit Date: 8/19/2019  Physician Orders: PT Eval and Treat   Medical Diagnosis from Referral: M54.5,G89.29 (ICD-10-CM) - Chronic bilateral low back pain without sciatica  Evaluation Date: 7/18/2019  Authorization Period Expiration: 12/31/19  Plan of Care Expiration: 9/13/19  Visit # / Visits authorized: 7/ 30    Time In: 7:04 am  Time Out: 8:00 am  Total Billable Time: 56 minutes    Precautions: Standard    Subjective     Pt reports:  Her left hip is sore today, as if she worked out really hard. Her stomach muscles were sore the day after therapy last week.   She was compliant with home exercise program.  Response to previous treatment: no adverse effects  Functional change: none yet    Pain: 2/10  Location: left back and buttocks      Objective     KWAKU received therapeutic exercises to develop strength, endurance, ROM, flexibility and core stabilization for 46 minutes including:  Recumbent bike x5 min- NP  Positional traction with red stability ball with heat x3 min-performed at end of the session  TA set with hooklying marching x10 each  TA with SLR 2x10 each  Dead bug heel taps x10 each  TA set with hip adduction squeeze 2x10- NP  LTR x10 on red stability ball  Stability ball bridge 2x10  Stability ball DKTC 20x5"  Prone lying x3 min  Bird dog x10 each  Supine knee to opposite shoulder stretch 3x30 sec each  SKTC 3x30 sec each  Positional L lumbar gapping x2 min  Sidelying clamshell 2x10 each green theraband   Sidelying hip abduction with towel roll under trunk 2x10  Standing TA set with red stability ball 20x5"- NP  MedX lumbar extension 2x10 50#- NP  Seated on stability ball with TA set and marching x10 each with UE support, x10 each without UE " "support- NP  Standing red theraband palloff pressnarrow  stance x20 each  SLS on ground with green med ball throws x20 each- NP  Lateral monster walks red theraband around knees x50' each- NP  Gentle yoga poses- warrior 2, crescent; with cueing for hip ER and posterior pelvic tilt x5 min- NP    Georgia received 10 min manual therapy, including:  15 min intermittent traction 40-70lb, 60 sec hold, 20 sec off- NP  Intermittent manual lumbar traction with strap x10 min  MET for L innominate inferior glide with hip hike x5 min- NP  Stick massage to L IT band and glutes x10 min- NP    Home Exercises Provided and Patient Education Provided     Education provided:   - pathophysiology    Written Home Exercises Provided: yes.  Exercises were reviewed and GEORGIA was able to demonstrate them prior to the end of the session.  GEORGIA demonstrated good  understanding of the education provided.     See EMR under Patient Instructions for exercises provided prior visit.    Assessment     Pain relieved with positional traction to gap L lumbar spine. Tolerated activities fairly well but continues to get "stiffness" in left hip. Will continue to progress hip and core strengthening as tolerated.   GEORGIA is progressing well towards her goals.   Pt prognosis is Good.     Pt will continue to benefit from skilled outpatient physical therapy to address the deficits listed in the problem list box on initial evaluation, provide pt/family education and to maximize pt's level of independence in the home and community environment.     Pt's spiritual, cultural and educational needs considered and pt agreeable to plan of care and goals.    Anticipated barriers to physical therapy: none anticipated    Goals:   Short Term Goals: 4 weeks   - stand with appropriate posture with min cueing and no increase in pain for 10 min. Met 8/14/19.   - lift 10 # floor to waist with proper technique with min pain with min cueing. Met 8/14/19.   - have less pain " with bed mobility and complete in usual time. Met 8/14/19.   - pt will be able to perform TA set with LE movement to demonstrate improved lumbar and pelvic stability . Met 8/14/19.      Long Term Goals: 8 weeks   - pt will demonstrate improved lumbar ROM with decreased pain symptoms. Met 8/14/19.   - pt will demonstrate 5/5 LE strength via MMT for improved stability. Met 8/14/19.   - pt will tolerate standing for full house showing without requiring a rest break for improved work tolerance (progressing, not met)  - pt will report <2/10 pain at worst with ADLs for improved functional tolerance (progressing, not met)    Plan     Continue per POC, addressing manual therapy and strengthening needs as tolerated.    Migdalia Varela, PT

## 2019-08-28 ENCOUNTER — CLINICAL SUPPORT (OUTPATIENT)
Dept: REHABILITATION | Facility: HOSPITAL | Age: 54
End: 2019-08-28
Attending: FAMILY MEDICINE
Payer: COMMERCIAL

## 2019-08-28 DIAGNOSIS — M54.50 CHRONIC BILATERAL LOW BACK PAIN WITHOUT SCIATICA: ICD-10-CM

## 2019-08-28 DIAGNOSIS — G89.29 CHRONIC BILATERAL LOW BACK PAIN WITHOUT SCIATICA: ICD-10-CM

## 2019-08-28 DIAGNOSIS — M25.69 BACK STIFFNESS: ICD-10-CM

## 2019-08-28 PROCEDURE — 97140 MANUAL THERAPY 1/> REGIONS: CPT | Mod: PO

## 2019-08-28 PROCEDURE — 97110 THERAPEUTIC EXERCISES: CPT | Mod: PO

## 2019-08-28 NOTE — PROGRESS NOTES
"  Physical Therapy Daily Treatment Note and Discharge     Name: Kwaku Gomez  Clinic Number: [id number]    Therapy Diagnosis:   Encounter Diagnoses   Name Primary?    Back stiffness     Chronic bilateral low back pain without sciatica      Physician: Grace Cuello MD    Visit Date: 8/28/2019  Physician Orders: PT Eval and Treat   Medical Diagnosis from Referral: M54.5,G89.29 (ICD-10-CM) - Chronic bilateral low back pain without sciatica  Evaluation Date: 7/18/2019  Authorization Period Expiration: 12/31/19  Plan of Care Expiration: 9/13/19  Visit # / Visits authorized: 8/ 30    Time In: 7:00 am  Time Out: 7:53 am  Total Billable Time: 53 minutes    Precautions: Standard    Subjective     Pt reports:  She had been feeling pretty good, until this morning. Her left hip started hurting this morning. She is having a harder time coming to therapy, so she wants today to be her last session.   She was compliant with home exercise program.  Response to previous treatment: no adverse effects  Functional change: none yet    Pain: 2/10  Location: left back and buttocks      Objective     KWAKU received therapeutic exercises to develop strength, endurance, ROM, flexibility and core stabilization for 43 minutes including:  Positional traction with red stability ball with heat x3 min-performed at end of the session  TA set with hooklying marching 2x10 each  TA with SLR 2x10 each  Dead bug heel taps x10 each  Stability ball bridge 2x10  Stability ball DKTC 20x5"  Prone lying x3 min  Bird dog x10 each  Supine knee to opposite shoulder stretch 3x30 sec each  SKTC 3x30 sec each  Positional L lumbar gapping x2 min  Sidelying clamshell 2x10 each green theraband   Sidelying hip abduction with towel roll under trunk 2x10  MedX lumbar extension 2x10 54#    Kwaku received 10 min manual therapy, including:  Intermittent manual lumbar traction with strap x10 min    Home Exercises Provided and Patient Education Provided     Education " provided:   - pathophysiology    Written Home Exercises Provided: yes.  Exercises were reviewed and KWAKU was able to demonstrate them prior to the end of the session.  KWAKU demonstrated good  understanding of the education provided.     See EMR under Patient Instructions for exercises provided prior visit.    Assessment     Good tolerance for exercises and ability to demonstrate HEP activities correctly. Continues to benefit from manual traction. Due to patient's busy schedule and understanding of importance of exercises for maintenance, pt is being discharged with independent HEP.   KWAKU is progressing well towards her goals.   Pt prognosis is Good.     Pt will continue to benefit from skilled outpatient physical therapy to address the deficits listed in the problem list box on initial evaluation, provide pt/family education and to maximize pt's level of independence in the home and community environment.     Pt's spiritual, cultural and educational needs considered and pt agreeable to plan of care and goals.    Anticipated barriers to physical therapy: none anticipated    Goals:   Short Term Goals: 4 weeks   - stand with appropriate posture with min cueing and no increase in pain for 10 min. Met 8/14/19.   - lift 10 # floor to waist with proper technique with min pain with min cueing. Met 8/14/19.   - have less pain with bed mobility and complete in usual time. Met 8/14/19.   - pt will be able to perform TA set with LE movement to demonstrate improved lumbar and pelvic stability . Met 8/14/19.      Long Term Goals: 8 weeks   - pt will demonstrate improved lumbar ROM with decreased pain symptoms. Met 8/14/19.   - pt will demonstrate 5/5 LE strength via MMT for improved stability. Met 8/14/19.   - pt will tolerate standing for full house showing without requiring a rest break for improved work tolerance. Met 8/28/19.   - pt will report <2/10 pain at worst with ADLs for improved functional tolerance. Not  met.     Plan     Discharge from skilled PT services.     Migdalia Varela, PT

## 2019-08-28 NOTE — PATIENT INSTRUCTIONS
Lower Abdominal (Dead Bug): Strength        Lay on back, maintaining a pelvic tilt with knees lifted over hips. Tap one heel down, and then the other without letting the back arch off the ground. Repeat 10 times each side. Do 2 sessions per day.  Bring legs back close to body when tired.    Copyright © [employer]. All rights reserved.   Lumbar Rotation Stretch        Lie on back with left knee drawn toward chest, and towel roll under R waist. Slowly bring bent leg across body until stretch is felt in lower back/hip area. Hold 30 seconds.  Repeat 3 times per set. Do 1 sets per session. Do 2 sessions per day.     https://orth.[employer].us/199     Copyright © [employer]. All rights reserved.     Bird Dog Pose - Beginner        Keep pelvis stable. From table pose, step one leg back to plank pose. Reach opposite arm forward.  Hold for 2 breaths. Return arm and leg at same rate. Repeat 10 times, alternating sides.    Copyright © [employer]. All rights reserved.     Abdominal Lying        Lie on abdomen, pillows as needed under hips, ankles, forehead and chest. Rest forehead on hands or on folded towel as needed and/or instructed by your therapist.  Lie 3 minutes.     Copyright © [employer]. All rights reserved.     Side Lying Hip Abduction (Strength)    1. Lie down on the floor on your side. Rest your head on your arm. Bend your legs at the knees.  2. Keep your feet together and lift your top leg up so that your knees are . Keep your hips steady.     3. Slowly lower your leg back down.  4. Repeat 20 times, or as instructed.  5. Switch sides.     Challenge yourself  Put an elastic band or tubing around your thighs. Raise and lower your top leg slowly and steadily.      Date Last Reviewed: 3/29/2016  © 8858-4243 [employer]. [address] [place] [address], [place] [address]. All rights reserved. This information is not intended as a substitute for professional medical care. Always follow your healthcare professional's instructions.

## 2019-11-11 RX ORDER — BUPROPION HYDROCHLORIDE 200 MG/1
TABLET, EXTENDED RELEASE ORAL
Qty: 180 TABLET | Refills: 1 | Status: SHIPPED | OUTPATIENT
Start: 2019-11-11 | End: 2020-05-15

## 2020-02-21 ENCOUNTER — OFFICE VISIT (OUTPATIENT)
Dept: FAMILY MEDICINE | Facility: CLINIC | Age: 55
End: 2020-02-21
Payer: COMMERCIAL

## 2020-02-21 VITALS
HEART RATE: 95 BPM | HEIGHT: 66 IN | BODY MASS INDEX: 26.42 KG/M2 | RESPIRATION RATE: 16 BRPM | OXYGEN SATURATION: 99 % | DIASTOLIC BLOOD PRESSURE: 66 MMHG | WEIGHT: 164.38 LBS | SYSTOLIC BLOOD PRESSURE: 110 MMHG | TEMPERATURE: 98 F

## 2020-02-21 DIAGNOSIS — J06.9 UPPER RESPIRATORY TRACT INFECTION, UNSPECIFIED TYPE: Primary | ICD-10-CM

## 2020-02-21 PROCEDURE — 3008F BODY MASS INDEX DOCD: CPT | Mod: CPTII,S$GLB,, | Performed by: FAMILY MEDICINE

## 2020-02-21 PROCEDURE — 3008F PR BODY MASS INDEX (BMI) DOCUMENTED: ICD-10-PCS | Mod: CPTII,S$GLB,, | Performed by: FAMILY MEDICINE

## 2020-02-21 PROCEDURE — 99213 PR OFFICE/OUTPT VISIT, EST, LEVL III, 20-29 MIN: ICD-10-PCS | Mod: S$GLB,,, | Performed by: FAMILY MEDICINE

## 2020-02-21 PROCEDURE — 99999 PR PBB SHADOW E&M-EST. PATIENT-LVL III: ICD-10-PCS | Mod: PBBFAC,,, | Performed by: FAMILY MEDICINE

## 2020-02-21 PROCEDURE — 99999 PR PBB SHADOW E&M-EST. PATIENT-LVL III: CPT | Mod: PBBFAC,,, | Performed by: FAMILY MEDICINE

## 2020-02-21 PROCEDURE — 99213 OFFICE O/P EST LOW 20 MIN: CPT | Mod: S$GLB,,, | Performed by: FAMILY MEDICINE

## 2020-02-21 NOTE — PROGRESS NOTES
"Subjective:       Patient ID: Georgia Gomez is a 54 y.o. female.    Chief Complaint: Sore Throat (started yesterday) and Generalized Body Aches    HPI  Patient with c/o sore throat and body aches x 1 day. Afebrile, but doesn't feel well. +throat congestion.     Review of Systems:  Review of Systems   Constitutional: Positive for fatigue. Negative for activity change, chills and fever.   HENT: Positive for congestion, ear pain and postnasal drip. Negative for ear discharge, facial swelling, hearing loss, rhinorrhea, sore throat and tinnitus.    Eyes: Negative for redness and itching.   Respiratory: Negative for cough, chest tightness, shortness of breath and wheezing.    Musculoskeletal: Negative for arthralgias and myalgias.   Neurological: Negative for dizziness and headaches.       Objective:     Vitals:    02/21/20 0849   BP: 110/66   Pulse: 95   Resp: 16   Temp: 98.3 °F (36.8 °C)   TempSrc: Oral   SpO2: 99%   Weight: 74.6 kg (164 lb 5.7 oz)   Height: 5' 6" (1.676 m)          Physical Exam   Constitutional: She is oriented to person, place, and time. She appears well-developed and well-nourished. No distress.   HENT:   Head: Normocephalic and atraumatic.   Right Ear: External ear normal. Tympanic membrane is not erythematous. A middle ear effusion is present.   Left Ear: External ear normal. Tympanic membrane is not erythematous. A middle ear effusion is present.   Nose: Mucosal edema present. No rhinorrhea.   Mouth/Throat: Posterior oropharyngeal erythema present. No oropharyngeal exudate.   Eyes: Conjunctivae are normal. Right eye exhibits no discharge. Left eye exhibits no discharge.   Neck: Normal range of motion. Neck supple.   Cardiovascular: Normal rate and regular rhythm.   Pulmonary/Chest: Effort normal and breath sounds normal. No respiratory distress. She has no wheezes.   Abdominal: Soft. She exhibits no distension.   Lymphadenopathy:     She has no cervical adenopathy.   Neurological: She is alert " and oriented to person, place, and time.   Skin: Skin is warm and dry.   Nursing note and vitals reviewed.        Assessment & Plan:  Upper respiratory tract infection, unspecified type    Expected course of illness and sx tx incl otc med use reviewed. Notify MD if sx persist or worsen.   Reviewed otcs, self-care. Discussed steroid inj which she requested, but then declined after discussing pros/cons.   Patient given rx for zpak/medrol to start if persistent chest sx, particularly if febrile or worsening.

## 2020-05-15 RX ORDER — BUPROPION HYDROCHLORIDE 200 MG/1
TABLET, EXTENDED RELEASE ORAL
Qty: 180 TABLET | Refills: 1 | Status: SHIPPED | OUTPATIENT
Start: 2020-05-15 | End: 2021-03-04

## 2020-05-27 ENCOUNTER — TELEPHONE (OUTPATIENT)
Dept: FAMILY MEDICINE | Facility: CLINIC | Age: 55
End: 2020-05-27

## 2020-05-27 DIAGNOSIS — Z00.00 ROUTINE GENERAL MEDICAL EXAMINATION AT A HEALTH CARE FACILITY: Primary | ICD-10-CM

## 2020-06-04 ENCOUNTER — LAB VISIT (OUTPATIENT)
Dept: LAB | Facility: HOSPITAL | Age: 55
End: 2020-06-04
Attending: FAMILY MEDICINE
Payer: COMMERCIAL

## 2020-06-04 DIAGNOSIS — Z00.00 ROUTINE GENERAL MEDICAL EXAMINATION AT A HEALTH CARE FACILITY: ICD-10-CM

## 2020-06-04 LAB
25(OH)D3+25(OH)D2 SERPL-MCNC: 51 NG/ML (ref 30–96)
ALBUMIN SERPL BCP-MCNC: 4 G/DL (ref 3.5–5.2)
ALP SERPL-CCNC: 66 U/L (ref 55–135)
ALT SERPL W/O P-5'-P-CCNC: 15 U/L (ref 10–44)
ANION GAP SERPL CALC-SCNC: 7 MMOL/L (ref 8–16)
AST SERPL-CCNC: 20 U/L (ref 10–40)
BASOPHILS # BLD AUTO: 0.03 K/UL (ref 0–0.2)
BASOPHILS NFR BLD: 0.6 % (ref 0–1.9)
BILIRUB SERPL-MCNC: 0.3 MG/DL (ref 0.1–1)
BUN SERPL-MCNC: 21 MG/DL (ref 6–20)
CALCIUM SERPL-MCNC: 9 MG/DL (ref 8.7–10.5)
CHLORIDE SERPL-SCNC: 107 MMOL/L (ref 95–110)
CHOLEST SERPL-MCNC: 172 MG/DL (ref 120–199)
CHOLEST/HDLC SERPL: 2.6 {RATIO} (ref 2–5)
CO2 SERPL-SCNC: 24 MMOL/L (ref 23–29)
CREAT SERPL-MCNC: 0.8 MG/DL (ref 0.5–1.4)
DIFFERENTIAL METHOD: ABNORMAL
EOSINOPHIL # BLD AUTO: 0.1 K/UL (ref 0–0.5)
EOSINOPHIL NFR BLD: 1.6 % (ref 0–8)
ERYTHROCYTE [DISTWIDTH] IN BLOOD BY AUTOMATED COUNT: 12.2 % (ref 11.5–14.5)
EST. GFR  (AFRICAN AMERICAN): >60 ML/MIN/1.73 M^2
EST. GFR  (NON AFRICAN AMERICAN): >60 ML/MIN/1.73 M^2
ESTIMATED AVG GLUCOSE: 100 MG/DL (ref 68–131)
GLUCOSE SERPL-MCNC: 97 MG/DL (ref 70–110)
HBA1C MFR BLD HPLC: 5.1 % (ref 4–5.6)
HCT VFR BLD AUTO: 41.8 % (ref 37–48.5)
HDLC SERPL-MCNC: 66 MG/DL (ref 40–75)
HDLC SERPL: 38.4 % (ref 20–50)
HGB BLD-MCNC: 13.1 G/DL (ref 12–16)
IMM GRANULOCYTES # BLD AUTO: 0.01 K/UL (ref 0–0.04)
IMM GRANULOCYTES NFR BLD AUTO: 0.2 % (ref 0–0.5)
LDLC SERPL CALC-MCNC: 95.2 MG/DL (ref 63–159)
LYMPHOCYTES # BLD AUTO: 1.9 K/UL (ref 1–4.8)
LYMPHOCYTES NFR BLD: 36.8 % (ref 18–48)
MCH RBC QN AUTO: 29.1 PG (ref 27–31)
MCHC RBC AUTO-ENTMCNC: 31.3 G/DL (ref 32–36)
MCV RBC AUTO: 93 FL (ref 82–98)
MONOCYTES # BLD AUTO: 0.3 K/UL (ref 0.3–1)
MONOCYTES NFR BLD: 6.4 % (ref 4–15)
NEUTROPHILS # BLD AUTO: 2.8 K/UL (ref 1.8–7.7)
NEUTROPHILS NFR BLD: 54.4 % (ref 38–73)
NONHDLC SERPL-MCNC: 106 MG/DL
NRBC BLD-RTO: 0 /100 WBC
PLATELET # BLD AUTO: 232 K/UL (ref 150–350)
PMV BLD AUTO: 9.5 FL (ref 9.2–12.9)
POTASSIUM SERPL-SCNC: 4 MMOL/L (ref 3.5–5.1)
PROT SERPL-MCNC: 7.4 G/DL (ref 6–8.4)
RBC # BLD AUTO: 4.5 M/UL (ref 4–5.4)
SODIUM SERPL-SCNC: 138 MMOL/L (ref 136–145)
TRIGL SERPL-MCNC: 54 MG/DL (ref 30–150)
TSH SERPL DL<=0.005 MIU/L-ACNC: 1.56 UIU/ML (ref 0.4–4)
WBC # BLD AUTO: 5.14 K/UL (ref 3.9–12.7)

## 2020-06-04 PROCEDURE — 82306 VITAMIN D 25 HYDROXY: CPT

## 2020-06-04 PROCEDURE — 83036 HEMOGLOBIN GLYCOSYLATED A1C: CPT

## 2020-06-04 PROCEDURE — 80053 COMPREHEN METABOLIC PANEL: CPT

## 2020-06-04 PROCEDURE — 84443 ASSAY THYROID STIM HORMONE: CPT

## 2020-06-04 PROCEDURE — 80061 LIPID PANEL: CPT

## 2020-06-04 PROCEDURE — 36415 COLL VENOUS BLD VENIPUNCTURE: CPT | Mod: PN

## 2020-06-04 PROCEDURE — 85025 COMPLETE CBC W/AUTO DIFF WBC: CPT

## 2020-06-17 ENCOUNTER — OFFICE VISIT (OUTPATIENT)
Dept: FAMILY MEDICINE | Facility: CLINIC | Age: 55
End: 2020-06-17
Payer: COMMERCIAL

## 2020-06-17 VITALS
HEIGHT: 66 IN | TEMPERATURE: 98 F | HEART RATE: 82 BPM | BODY MASS INDEX: 26.38 KG/M2 | OXYGEN SATURATION: 97 % | DIASTOLIC BLOOD PRESSURE: 68 MMHG | WEIGHT: 164.13 LBS | SYSTOLIC BLOOD PRESSURE: 102 MMHG

## 2020-06-17 DIAGNOSIS — Z12.11 SPECIAL SCREENING FOR MALIGNANT NEOPLASMS, COLON: ICD-10-CM

## 2020-06-17 DIAGNOSIS — Z00.00 ROUTINE GENERAL MEDICAL EXAMINATION AT A HEALTH CARE FACILITY: Primary | ICD-10-CM

## 2020-06-17 DIAGNOSIS — K64.9 HEMORRHOIDS, UNSPECIFIED HEMORRHOID TYPE: ICD-10-CM

## 2020-06-17 DIAGNOSIS — I25.10 ATHEROSCLEROSIS OF NATIVE CORONARY ARTERY WITHOUT ANGINA PECTORIS, UNSPECIFIED WHETHER NATIVE OR TRANSPLANTED HEART: ICD-10-CM

## 2020-06-17 PROBLEM — M85.80 OSTEOPENIA: Status: ACTIVE | Noted: 2019-08-14

## 2020-06-17 PROCEDURE — 93010 ELECTROCARDIOGRAM REPORT: CPT | Mod: S$GLB,,, | Performed by: INTERNAL MEDICINE

## 2020-06-17 PROCEDURE — 93005 ELECTROCARDIOGRAM TRACING: CPT | Mod: S$GLB,,, | Performed by: FAMILY MEDICINE

## 2020-06-17 PROCEDURE — 99999 PR PBB SHADOW E&M-EST. PATIENT-LVL IV: CPT | Mod: PBBFAC,,, | Performed by: FAMILY MEDICINE

## 2020-06-17 PROCEDURE — 93005 EKG 12-LEAD: ICD-10-PCS | Mod: S$GLB,,, | Performed by: FAMILY MEDICINE

## 2020-06-17 PROCEDURE — 93010 EKG 12-LEAD: ICD-10-PCS | Mod: S$GLB,,, | Performed by: INTERNAL MEDICINE

## 2020-06-17 PROCEDURE — 99396 PREV VISIT EST AGE 40-64: CPT | Mod: S$GLB,,, | Performed by: FAMILY MEDICINE

## 2020-06-17 PROCEDURE — 99396 PR PREVENTIVE VISIT,EST,40-64: ICD-10-PCS | Mod: S$GLB,,, | Performed by: FAMILY MEDICINE

## 2020-06-17 PROCEDURE — 99999 PR PBB SHADOW E&M-EST. PATIENT-LVL IV: ICD-10-PCS | Mod: PBBFAC,,, | Performed by: FAMILY MEDICINE

## 2020-06-17 RX ORDER — HYDROCORTISONE 25 MG/G
CREAM TOPICAL 2 TIMES DAILY PRN
Qty: 30 G | Refills: 0 | Status: SHIPPED | OUTPATIENT
Start: 2020-06-17 | End: 2021-10-04

## 2020-06-17 RX ORDER — HYDROCORTISONE ACETATE 25 MG/1
25 SUPPOSITORY RECTAL 2 TIMES DAILY
Qty: 20 SUPPOSITORY | Refills: 1 | Status: SHIPPED | OUTPATIENT
Start: 2020-06-17 | End: 2020-06-27

## 2020-06-17 NOTE — PROGRESS NOTES
Subjective:       Patient ID: Georgia Gomez is a 55 y.o. female.    Chief Complaint: Annual Exam      Georgia Gomez is in the office for annual exam.    HPI  No updates to medical hx.  Past Medical History:   Diagnosis Date    Arthritis     Headache(784.0)     Rash      Notes that she's getting leg pains at night. Not a muscle cramp, but can wake her up. R>L. Extends from quad to mid calf. No leg swelling or color change. Occurring for at least several months.   We previously decreased her wellbutrin from 200mg bid to 150bid bc of palpitations. They still occur occasionally.    Current Outpatient Medications:     biotin 5,000 mcg TbDL, Take 1 tablet by mouth once daily. , Disp: , Rfl:     buPROPion (WELLBUTRIN SR) 200 MG SR12, TAKE 1 TABLET BY MOUTH TWICE A DAY, Disp: 180 tablet, Rfl: 1    estradiol 0.05 mg/24 hr td ptsw (VIVELLE-DOT) 0.05 mg/24 hr, Place 1 patch onto the skin twice a week. , Disp: , Rfl:     multivitamin (ONE DAILY MULTIVITAMIN) per tablet, Take 1 tablet by mouth once daily., Disp: , Rfl:     vitamin D 1000 units Tab, Take 1,000 Units by mouth once daily., Disp: , Rfl:     The 10-year ASCVD risk score (Soraya TRUDY Jr., et al., 2013) is: 0.9%    Values used to calculate the score:      Age: 55 years      Sex: Female      Is Non- : No      Diabetic: No      Tobacco smoker: No      Systolic Blood Pressure: 102 mmHg      Is BP treated: No      HDL Cholesterol: 66 mg/dL      Total Cholesterol: 172 mg/dL     Lab Results   Component Value Date    HGBA1C 5.1 06/04/2020    HGBA1C 5.2 09/06/2018     Lab Results   Component Value Date    LDLCALC 95.2 06/04/2020    CREATININE 0.8 06/04/2020   Labs 2020 rev.    Review of Systems   Constitutional: Negative for activity change, fatigue and fever.   HENT: Negative for congestion, hearing loss, rhinorrhea and trouble swallowing.    Eyes: Negative for photophobia and visual disturbance.        She notes age-related vision changes.    Respiratory: Negative for cough, chest tightness, shortness of breath and wheezing.    Cardiovascular: Positive for palpitations. Negative for chest pain and leg swelling.   Gastrointestinal: Negative for blood in stool, constipation and diarrhea.   Genitourinary: Negative for difficulty urinating and dysuria.   Musculoskeletal: Positive for arthralgias (pain going down the R leg (mostly at night)). Negative for back pain, joint swelling and neck pain.   Neurological: Negative for dizziness, weakness, light-headedness, numbness and headaches.   Psychiatric/Behavioral: Negative for dysphoric mood and sleep disturbance. The patient is not nervous/anxious.         She's noticed a slight difference since adjusting her wellbutrin dose. A few more days with mood changes.           Objective:      Physical Exam  Vitals signs and nursing note reviewed.   Constitutional:       General: She is not in acute distress.     Appearance: She is well-developed.   HENT:      Head: Normocephalic and atraumatic.      Right Ear: External ear normal.      Left Ear: External ear normal.      Nose: Nose normal.      Mouth/Throat:      Pharynx: No oropharyngeal exudate.   Eyes:      Conjunctiva/sclera: Conjunctivae normal.      Pupils: Pupils are equal, round, and reactive to light.   Neck:      Musculoskeletal: Normal range of motion and neck supple.      Thyroid: No thyromegaly.   Cardiovascular:      Rate and Rhythm: Normal rate and regular rhythm.   Pulmonary:      Effort: Pulmonary effort is normal. No respiratory distress.      Breath sounds: Normal breath sounds. No wheezing.   Abdominal:      General: Bowel sounds are normal. There is no distension.      Palpations: Abdomen is soft. There is no mass.      Tenderness: There is no abdominal tenderness. There is no guarding or rebound.   Musculoskeletal: Normal range of motion.         General: No swelling.   Lymphadenopathy:      Cervical: No cervical adenopathy.   Skin:      General: Skin is warm and dry.      Findings: No erythema.   Neurological:      Mental Status: She is alert and oriented to person, place, and time.   Psychiatric:         Mood and Affect: Mood normal.         Thought Content: Thought content normal.             Screening recommendations appropriate to age and health status were reviewed.    Assessment & Plan:    Routine general medical examination at a health care facility  Comments:  Anticipatory guidance reviewed  Orders:  -     EKG 12-lead; Future    Atherosclerosis of native coronary artery without angina pectoris, unspecified whether native or transplanted heart  Comments:  Update calcium score for continued monitoring  Orders:  -     CT Cardiac Scoring; Future; Expected date: 06/17/2020    Special screening for malignant neoplasms, colon  -     Case request GI: COLONOSCOPY    Hemorrhoids, unspecified hemorrhoid type  Comments:  Continue topicals or suppository as needed for relief.  Discussed review with general surgery for definitive management should they persist    Other orders  -     hydrocortisone (PROCTOZONE-HC) 2.5 % rectal cream; Place rectally 2 (two) times daily as needed for Hemorrhoids. PLACE RECTALLY TWICE DAILY AS DIRECTED  Dispense: 30 g; Refill: 0  -     hydrocortisone (ANUSOL-HC) 25 mg suppository; Place 1 suppository (25 mg total) rectally 2 (two) times daily. for 10 days  Dispense: 20 suppository; Refill: 1

## 2020-06-24 ENCOUNTER — HOSPITAL ENCOUNTER (OUTPATIENT)
Dept: RADIOLOGY | Facility: HOSPITAL | Age: 55
Discharge: HOME | End: 2020-06-24
Attending: FAMILY MEDICINE
Payer: COMMERCIAL

## 2020-06-24 ENCOUNTER — OFFICE VISIT (OUTPATIENT)
Dept: PAIN MEDICINE | Facility: CLINIC | Age: 55
End: 2020-06-24
Payer: COMMERCIAL

## 2020-06-24 ENCOUNTER — HOSPITAL ENCOUNTER (OUTPATIENT)
Dept: RADIOLOGY | Facility: HOSPITAL | Age: 55
Discharge: HOME | End: 2020-06-24
Attending: SPECIALIST
Payer: COMMERCIAL

## 2020-06-24 VITALS
DIASTOLIC BLOOD PRESSURE: 62 MMHG | BODY MASS INDEX: 26.37 KG/M2 | SYSTOLIC BLOOD PRESSURE: 100 MMHG | RESPIRATION RATE: 20 BRPM | TEMPERATURE: 99 F | HEART RATE: 79 BPM | WEIGHT: 163.38 LBS

## 2020-06-24 DIAGNOSIS — I25.10 ATHEROSCLEROSIS OF NATIVE CORONARY ARTERY WITHOUT ANGINA PECTORIS, UNSPECIFIED WHETHER NATIVE OR TRANSPLANTED HEART: ICD-10-CM

## 2020-06-24 DIAGNOSIS — G89.29 CHRONIC RIGHT-SIDED LOW BACK PAIN WITHOUT SCIATICA: ICD-10-CM

## 2020-06-24 DIAGNOSIS — M47.816 LUMBAR SPONDYLOSIS: Primary | ICD-10-CM

## 2020-06-24 DIAGNOSIS — M54.50 CHRONIC RIGHT-SIDED LOW BACK PAIN WITHOUT SCIATICA: ICD-10-CM

## 2020-06-24 DIAGNOSIS — Z13.820 SCREENING FOR OSTEOPOROSIS: ICD-10-CM

## 2020-06-24 PROCEDURE — 75571 CT CALCIUM SCORING CARDIAC: ICD-10-PCS | Mod: 26,,, | Performed by: RADIOLOGY

## 2020-06-24 PROCEDURE — 3008F BODY MASS INDEX DOCD: CPT | Mod: CPTII,S$GLB,, | Performed by: ANESTHESIOLOGY

## 2020-06-24 PROCEDURE — 3008F PR BODY MASS INDEX (BMI) DOCUMENTED: ICD-10-PCS | Mod: CPTII,S$GLB,, | Performed by: ANESTHESIOLOGY

## 2020-06-24 PROCEDURE — 77080 DEXA BONE DENSITY SPINE HIP: ICD-10-PCS | Mod: 26,,, | Performed by: RADIOLOGY

## 2020-06-24 PROCEDURE — 75571 CT HRT W/O DYE W/CA TEST: CPT | Mod: 26,,, | Performed by: RADIOLOGY

## 2020-06-24 PROCEDURE — 99214 PR OFFICE/OUTPT VISIT, EST, LEVL IV, 30-39 MIN: ICD-10-PCS | Mod: S$GLB,,, | Performed by: ANESTHESIOLOGY

## 2020-06-24 PROCEDURE — 75571 CT HRT W/O DYE W/CA TEST: CPT | Mod: TC,PO

## 2020-06-24 PROCEDURE — 99999 PR PBB SHADOW E&M-EST. PATIENT-LVL III: ICD-10-PCS | Mod: PBBFAC,,, | Performed by: ANESTHESIOLOGY

## 2020-06-24 PROCEDURE — 77080 DXA BONE DENSITY AXIAL: CPT | Mod: 26,,, | Performed by: RADIOLOGY

## 2020-06-24 PROCEDURE — 99999 PR PBB SHADOW E&M-EST. PATIENT-LVL III: CPT | Mod: PBBFAC,,, | Performed by: ANESTHESIOLOGY

## 2020-06-24 PROCEDURE — 99214 OFFICE O/P EST MOD 30 MIN: CPT | Mod: S$GLB,,, | Performed by: ANESTHESIOLOGY

## 2020-06-24 PROCEDURE — 77080 DXA BONE DENSITY AXIAL: CPT | Mod: TC,PO

## 2020-06-24 NOTE — LETTER
June 24, 2020      Grace Cuello MD  [address]  [place] [address]           [facility]  [address] [facility] [address]  Phone: [phone]  Fax: [phone]          Patient: Georgia Gomez   MR Number: [id number]   YOB: 1965   Date of Visit: 6/24/2020       Dear Dr. Grace Cuello:    Thank you for referring Georgia Gomez to me for evaluation. Attached you will find relevant portions of my assessment and plan of care.    If you have questions, please do not hesitate to call me. I look forward to following Georgia Gomez along with you.    Sincerely,    Oz Pizano MD    Enclosure  CC:  No Recipients    If you would like to receive this communication electronically, please contact [email or web address] or [phone] to request more information on [employer] access.    For providers and/or their staff who would like to refer a patient to [facility], please contact us through our one-stop-shop provider referral line, [facility], at [phone].    If you feel you have received this communication in error or would no longer like to receive these types of communications, please e-mail [email or web address]

## 2020-06-24 NOTE — PROGRESS NOTES
[facility] Follow Up Evaluation    Referred by: Dr. Cuello  Reason for referral: back pain    CC:   Chief Complaint   Patient presents with    Hip Pain    Leg Pain      Last 3 PDI Scores 6/24/2020 8/1/2019   Pain Disability Index (PDI) 0 9     Interval HPI 6/24/20: Ms. Gomez returns to the office for follow up.  Today she reports she continues to have right sided lower back pain, intermittent, dull, aching, 6/10.  She has some referred pain to the right thigh.  Denies any numbness or weakness.  Her pain is worse in the morning and worse with standing and back extension.  She has completed formal PT in the past which didn't make a big difference in her pain.  Over the past 6 weeks she continues home exercise and NSAIDs prn.    HPI:   Georgia Gomez is a 55 y.o. female who complains of back pain    Onset: about 3 months  Inciting Event: none  Progression: since onset, pain is stable  Current Pain Score: 4/10  Timing: constant  Quality: sharp  Radiation: no  Associated numbness or weakness: no numbness, no weakness  Exacerbated by: standing  Allievated by: sitting, laying down  Is Pain Level Acceptable?: No    Previous Therapies:  PT/OT: yes, currently  HEP:   Interventions:   Surgery:  Medications:   - NSAIDS: aleve  - MSK Relaxants:   - TCAs:   - SNRIs:   - Topicals:   - Anticonvulsants:  - Opioids:     History:    Current Outpatient Medications:     biotin 5,000 mcg TbDL, Take 1 tablet by mouth once daily. , Disp: , Rfl:     buPROPion (WELLBUTRIN SR) 200 MG SR12, TAKE 1 TABLET BY MOUTH TWICE A DAY, Disp: 180 tablet, Rfl: 1    estradiol 0.05 mg/24 hr td ptsw (VIVELLE-DOT) 0.05 mg/24 hr, Place 1 patch onto the skin twice a week. , Disp: , Rfl:     hydrocortisone (ANUSOL-HC) 25 mg suppository, Place 1 suppository (25 mg total) rectally 2 (two) times daily. for 10 days, Disp: 20 suppository, Rfl: 1    hydrocortisone (PROCTOZONE-HC) 2.5 % rectal cream, Place rectally 2 (two) times daily as needed for  Hemorrhoids. PLACE RECTALLY TWICE DAILY AS DIRECTED, Disp: 30 g, Rfl: 0    multivitamin (ONE DAILY MULTIVITAMIN) per tablet, Take 1 tablet by mouth once daily., Disp: , Rfl:     vitamin D 1000 units Tab, Take 1,000 Units by mouth once daily., Disp: , Rfl:     Past Medical History:   Diagnosis Date    Arthritis     Headache(784.0)     Rash        Past Surgical History:   Procedure Laterality Date    HYSTERECTOMY  10/2014    uterine fibroids       Family History   Problem Relation Age of Onset    Diabetes Mother     Hypertension Mother     Cancer Father         lung ca    Heart disease Father     Hyperlipidemia Father     Hypertension Father     Cancer Sister         Non Hodgkin's lymphoma    No Known Problems Daughter     No Known Problems Son        Social History     Socioeconomic History    Marital status:      Spouse name: Not on file    Number of children: Not on file    Years of education: Not on file    Highest education level: Not on file   Occupational History    Not on file   Social Needs    Financial resource strain: Not hard at all    Food insecurity     Worry: Never true     Inability: Never true    Transportation needs     Medical: No     Non-medical: No   Tobacco Use    Smoking status: Never Smoker    Smokeless tobacco: Never Used   Substance and Sexual Activity    Alcohol use: Yes     Frequency: Monthly or less     Binge frequency: Never     Comment: occ    Drug use: No    Sexual activity: Yes     Partners: Male     Birth control/protection: See Surgical Hx   Lifestyle    Physical activity     Days per week: 5 days     Minutes per session: 40 min    Stress: Only a little   Relationships    Social connections     Talks on phone: Twice a week     Gets together: Three times a week     Attends Yarsani service: Not on file     Active member of club or organization: No     Attends meetings of clubs or organizations: Never     Relationship status:    Other  Topics Concern    Are you pregnant or think you may be? Not Asked    Breast-feeding Not Asked   Social History Narrative    She currently works as a .        Review of patient's allergies indicates:  No Known Allergies    Review of Systems:  General ROS: negative for - fever  Psychological ROS: negative for - hostility  Hematological and Lymphatic ROS: negative for - bleeding problems  Endocrine ROS: negative for - unexpected weight changes  Respiratory ROS: no cough, shortness of breath, or wheezing  Cardiovascular ROS: no chest pain or dyspnea on exertion  Gastrointestinal ROS: no abdominal pain, change in bowel habits, or black or bloody stools  Musculoskeletal ROS: negative for - muscular weakness  Neurological ROS: negative for - bowel and bladder control changes, impaired coordination/balance or numbness/tingling  Dermatological ROS: negative for rash    Physical Exam:  Vitals:    06/24/20 0835   BP: 100/62   Pulse: 79   Resp: 20   Temp: 98.8 °F (37.1 °C)   TempSrc: Temporal   Weight: 74.1 kg (163 lb 5.8 oz)   PainSc:   4   PainLoc: Hip     Body mass index is 26.37 kg/m².     Gen: NAD  Gait: gait intact  Psych:  Mood appropriate for given condition  HEENT: eyes anicteric   GI: Abd soft  CV: RRR  Lungs: breathing unlabored   ROM: limited AROM of the L spine in all planes, full ROM at ankles, knees and hips  Lumbar flexion 90 degrees, extension 50 degrees, side bending 30 degrees.    Sensation: intact to light touch in all dermatomes tested from L2-S1 bilaterally  Reflexes: 2+ b/l patella and 0/0 b/l achilles  Palpation: Diffusely tender over lumbar paraspinals  +TTP over the right greater trochanter and right SI joint  Tone: normal in the b/l knees and hips   Skin: intact  Extremities: No edema in b/l ankles or hands  Provacative tests: - SLR, + axial facet loading on the right, + JASON on the right       Right Left   L2/3 Iliacus Hip flexion  5  5   L3/4 Qudratus Femoris Knee Extension  5  5    L4/5 Tib Anterior Ankle Dorsiflexion   5  5   L5/S1 Extensor Hallicus Longus Great toe extension  5  5                 S1/S2 Gastroc/Soleus Plantar Flexion  5  5       Imaging:  MRI lumbar spine 7/23/19  FINDINGS:  Vertebral column: The vertebral bodies maintain normal height.  Question of minimal superior endplate compression deformity of L1 on plain films was likely related to positioning.  There is no acute or chronic fracture.  There is mild disc space narrowing anteriorly at the L1-2 and L2-3 levels.  There is a small chronic Schmorl's node in the anterior inferior endplate of L2.  There is a similar small chronic anterior inferior endplate Schmorl's node at L5.  There is mild anterior endplate osteophyte formation most apparent at the L1-2 and L2-3 levels.  Baseline marrow signal is normal.    Spinal canal, conus, epidural space: The spinal canal is developmentally normal.  The conus terminates at the level of L1 and is normal in contour and signal intensity.  There is no abnormal epidural soft tissue mass or fluid collection.    Findings by level:    On the sagittal images, the T11-12 level is normal.    T12-L1: There is a minimal disc bulge.  There is no spinal canal or significant foraminal stenosis.  L1-2: There is a mild diffuse disc bulge with subtle right posterolateral dorsal fissure.  There is no spinal canal or significant foraminal stenosis.  L2-3: There is a mild disc bulge with subtle dorsal annular fissure.  There is no spinal canal or significant foraminal stenosis.  L3-4: There is a mild disc bulge and mild facet joint arthropathy.  There is a subtle dorsal annular fissure.  There is no spinal canal or significant foraminal stenosis.  L4-5: There is a diffuse disc bulge with osteophytic ridging slightly eccentric to the right.  There is mild-to-moderate facet joint arthropathy with ligamentum flavum thickening.  There is no spinal stenosis.  There is mild crowding of the left lateral recess.   There is mild bilateral foraminal stenosis.  L5-S1: There is a mild disc bulge with osteophytic ridging eccentric to the right.  There is mild-to-moderate facet joint arthropathy.  There is a small facet joint effusion bilaterally.  There is no spinal stenosis.  There is moderate bilateral foraminal stenosis.    Soft tissues, other: The prevertebral soft tissues are normal.  The aorta is normal in caliber.  There are round T2 hyperintense lesions in the right kidney likely representing cyst in the superior pole but incompletely included and evaluated on the study.    Labs:  BMP  Lab Results   Component Value Date     06/04/2020    K 4.0 06/04/2020     06/04/2020    CO2 24 06/04/2020    BUN 21 (H) 06/04/2020    CREATININE 0.8 06/04/2020    CALCIUM 9.0 06/04/2020    ANIONGAP 7 (L) 06/04/2020    ESTGFRAFRICA >60.0 06/04/2020    EGFRNONAA >60.0 06/04/2020     Lab Results   Component Value Date    ALT 15 06/04/2020    AST 20 06/04/2020    ALKPHOS 66 06/04/2020    BILITOT 0.3 06/04/2020       Assessment:  Problem List Items Addressed This Visit        Neuro    Lumbar spondylosis - Primary      Other Visit Diagnoses     Chronic right-sided low back pain without sciatica              Treatment Plan:  55 y.o. year old female with PMH JUSTIN on CPAP, depression presents to the office with back pain.  She has had back pain since the beginning of May and it has been stable.  She denies any previous lumbar surgery or injections.  Today her pain is 4/10, constant, sharp, in her lower back.  She denies any radicular pain or radiculopathy.  Her pain is worse with standing and back extension and relieved with sitting and laying down.  She is currently doing PT and has done 2 sessions so far.  She has also tried aleve with only mild relief.  On exam 5/5 strength b/l LE's, 2+ b/l patella 0/0 b/l achilles, + b/l axial facet loading, - SLR.  MRI lumbar spine is consistent with multilevel facet arthropathy, subtle dorsal  annular fissure at L3-4 and L4-5 diffuse disc bulge with osteophytic ridging slightly eccentric to the right.  I think her primary pain generator is related to her lumbar facet arthropathy, although she may have some small contribution of discogenic pain.  Discussed treatment options of continued conservative care vs interventions such as lumbar medial branch block.  At this time she would like the continue conservative care with PT and NSAIDs.  She will call and follow up with her pain does not improve or worsens.    6/24/20 - Ms. Gomez returns to the office for follow up.  Today she reports she continues to have right sided lower back pain, intermittent, dull, aching, 6/10.  She has some referred pain to the right thigh.  Denies any numbness or weakness.  Her pain is worse in the morning and worse with standing and back extension.  She has completed formal PT in the past which didn't make a big difference in her pain.  Over the past 6 weeks she continues home exercise and NSAIDs prn.  On exam she has 5/5 strength b/l LE's, 2+ b/l patellar and 0/0 b/l achilles dtr.  Her pain is reproduced with right sided axial facet loading.  She also has some tenderness over the right SI joint and right GTB to palpation which is likely causing the referred pain to her thigh.  We reviewed her lumbar MRI and she has moderate right sided facet arthropathy on the right at L4/5 and L5/S1.  I think her primary pain generator is from her lumbar spondylosis.  Also on the differential is sacroiliitis and greater trochanteric bursitis.      Procedures: Offered 1st diagnostic right L4/5 and L5/S1 medial branch blocks.  She will take some time to think about it as she would like to coordinate it with colonoscopy to limit her time off of work.   PT/OT/HEP: she has completed formal PT and over the past 6 weeks she continues home exercise and NSAIDs prn.  Medications: continue aleve prn  Labs: Reviewed and medications are appropriately dosed  for current hepatorenal function.  Imaging: No additional recommended at this time.    : Not applicable    Oz Pizano M.D.  Interventional Pain Medicine / Anesthesiology

## 2020-06-26 ENCOUNTER — TELEPHONE (OUTPATIENT)
Dept: GASTROENTEROLOGY | Facility: CLINIC | Age: 55
End: 2020-06-26

## 2020-06-26 NOTE — TELEPHONE ENCOUNTER
----- Message from Josefina Vitale sent at 6/26/2020  3:57 PM CDT -----  Type: Needs Medical Advice  Who Called:  new patient  Best Call Back Number: [phone]  Additional Information: please give call back to schedule colonoscopy. Referred by Dr. Cuello

## 2020-07-06 ENCOUNTER — TELEPHONE (OUTPATIENT)
Dept: GASTROENTEROLOGY | Facility: CLINIC | Age: 55
End: 2020-07-06

## 2020-07-06 NOTE — TELEPHONE ENCOUNTER
----- Message from Yancy Ballard sent at 7/6/2020  3:33 PM CDT -----  Regarding: Appointment  Contact: Patient  Type:  Appointment Request    Name of Caller:  Patient  When is the first available appointment? N/A  Symptoms: colonoscopy  Best Call Back Number:  [phone]  Additional Information:     Pt stated no one had tried to contact her back to schedule colonoscopy, advised her message was sent via [employer], says she does not use that much.  Requesting a phone call to schedule.

## 2020-07-14 DIAGNOSIS — Z01.812 PRE-PROCEDURE LAB EXAM: ICD-10-CM

## 2020-07-14 NOTE — TELEPHONE ENCOUNTER
Scheduled colonoscopy and COVID testing. Pt stated that she would come  instructions after her COVID testing.

## 2020-07-17 ENCOUNTER — LAB VISIT (OUTPATIENT)
Dept: FAMILY MEDICINE | Facility: CLINIC | Age: 55
End: 2020-07-17
Payer: COMMERCIAL

## 2020-07-17 DIAGNOSIS — Z01.812 PRE-PROCEDURE LAB EXAM: ICD-10-CM

## 2020-07-17 PROCEDURE — U0003 INFECTIOUS AGENT DETECTION BY NUCLEIC ACID (DNA OR RNA); SEVERE ACUTE RESPIRATORY SYNDROME CORONAVIRUS 2 (SARS-COV-2) (CORONAVIRUS DISEASE [COVID-19]), AMPLIFIED PROBE TECHNIQUE, MAKING USE OF HIGH THROUGHPUT TECHNOLOGIES AS DESCRIBED BY CMS-2020-01-R: HCPCS

## 2020-07-18 LAB — SARS-COV-2 RNA RESP QL NAA+PROBE: NOT DETECTED

## 2020-07-20 ENCOUNTER — ANESTHESIA (OUTPATIENT)
Dept: ENDOSCOPY | Facility: HOSPITAL | Age: 55
End: 2020-07-20
Payer: COMMERCIAL

## 2020-07-20 ENCOUNTER — ANESTHESIA EVENT (OUTPATIENT)
Dept: ENDOSCOPY | Facility: HOSPITAL | Age: 55
End: 2020-07-20
Payer: COMMERCIAL

## 2020-07-20 ENCOUNTER — HOSPITAL ENCOUNTER (OUTPATIENT)
Facility: HOSPITAL | Age: 55
Discharge: HOME | End: 2020-07-20
Attending: INTERNAL MEDICINE | Admitting: INTERNAL MEDICINE
Payer: COMMERCIAL

## 2020-07-20 DIAGNOSIS — Z12.11 COLON CANCER SCREENING: ICD-10-CM

## 2020-07-20 PROCEDURE — D9220A PRA ANESTHESIA: Mod: ANES,,, | Performed by: ANESTHESIOLOGY

## 2020-07-20 PROCEDURE — 25000003 PHARM REV CODE 250: Mod: PO | Performed by: NURSE ANESTHETIST, CERTIFIED REGISTERED

## 2020-07-20 PROCEDURE — 63600175 PHARM REV CODE 636 W HCPCS: Mod: PO | Performed by: NURSE ANESTHETIST, CERTIFIED REGISTERED

## 2020-07-20 PROCEDURE — 63600175 PHARM REV CODE 636 W HCPCS: Mod: PO | Performed by: INTERNAL MEDICINE

## 2020-07-20 PROCEDURE — D9220A PRA ANESTHESIA: ICD-10-PCS | Mod: CRNA,,, | Performed by: NURSE ANESTHETIST, CERTIFIED REGISTERED

## 2020-07-20 PROCEDURE — G0121 COLON CA SCRN NOT HI RSK IND: ICD-10-PCS | Mod: ,,, | Performed by: INTERNAL MEDICINE

## 2020-07-20 PROCEDURE — 37000008 HC ANESTHESIA 1ST 15 MINUTES: Mod: PO | Performed by: INTERNAL MEDICINE

## 2020-07-20 PROCEDURE — D9220A PRA ANESTHESIA: ICD-10-PCS | Mod: ANES,,, | Performed by: ANESTHESIOLOGY

## 2020-07-20 PROCEDURE — 37000009 HC ANESTHESIA EA ADD 15 MINS: Mod: PO | Performed by: INTERNAL MEDICINE

## 2020-07-20 PROCEDURE — G0121 COLON CA SCRN NOT HI RSK IND: HCPCS | Mod: PO | Performed by: INTERNAL MEDICINE

## 2020-07-20 PROCEDURE — G0121 COLON CA SCRN NOT HI RSK IND: HCPCS | Mod: ,,, | Performed by: INTERNAL MEDICINE

## 2020-07-20 PROCEDURE — D9220A PRA ANESTHESIA: Mod: CRNA,,, | Performed by: NURSE ANESTHETIST, CERTIFIED REGISTERED

## 2020-07-20 RX ORDER — PROPOFOL 10 MG/ML
VIAL (ML) INTRAVENOUS
Status: DISCONTINUED | OUTPATIENT
Start: 2020-07-20 | End: 2020-07-20

## 2020-07-20 RX ORDER — SODIUM CHLORIDE, SODIUM LACTATE, POTASSIUM CHLORIDE, CALCIUM CHLORIDE 600; 310; 30; 20 MG/100ML; MG/100ML; MG/100ML; MG/100ML
INJECTION, SOLUTION INTRAVENOUS CONTINUOUS
Status: DISCONTINUED | OUTPATIENT
Start: 2020-07-21 | End: 2020-07-20 | Stop reason: HOSPADM

## 2020-07-20 RX ORDER — SODIUM CHLORIDE 0.9 % (FLUSH) 0.9 %
10 SYRINGE (ML) INJECTION
Status: DISCONTINUED | OUTPATIENT
Start: 2020-07-20 | End: 2020-07-20 | Stop reason: HOSPADM

## 2020-07-20 RX ORDER — LIDOCAINE HCL/PF 100 MG/5ML
SYRINGE (ML) INTRAVENOUS
Status: DISCONTINUED | OUTPATIENT
Start: 2020-07-20 | End: 2020-07-20

## 2020-07-20 RX ORDER — PROPOFOL 10 MG/ML
VIAL (ML) INTRAVENOUS CONTINUOUS PRN
Status: DISCONTINUED | OUTPATIENT
Start: 2020-07-20 | End: 2020-07-20

## 2020-07-20 RX ADMIN — LIDOCAINE HYDROCHLORIDE 50 MG: 20 INJECTION, SOLUTION INTRAVENOUS at 11:07

## 2020-07-20 RX ADMIN — SODIUM CHLORIDE, SODIUM LACTATE, POTASSIUM CHLORIDE, AND CALCIUM CHLORIDE: .6; .31; .03; .02 INJECTION, SOLUTION INTRAVENOUS at 10:07

## 2020-07-20 RX ADMIN — PROPOFOL 50 MG: 10 INJECTION, EMULSION INTRAVENOUS at 11:07

## 2020-07-20 RX ADMIN — PROPOFOL 70 MG: 10 INJECTION, EMULSION INTRAVENOUS at 11:07

## 2020-07-20 RX ADMIN — PROPOFOL 150 MCG/KG/MIN: 10 INJECTION, EMULSION INTRAVENOUS at 11:07

## 2020-07-20 NOTE — DISCHARGE INSTRUCTIONS
Recovery After Procedural Sedation (Adult)   You have been given medicine by vein to make you sleep during your surgery. This may have included both a pain medicine and sleeping medicine. Most of the effects have worn off. But you may still have some drowsiness for the next 6 to 8 hours.  Home care  Follow these guidelines when you get home:  · For the next 8 hours, you should be watched by a responsible adult. This person should make sure your condition is not getting worse.  · Don't drink any alcohol for the next 24 hours.  · Don't drive, operate dangerous machinery, or make important business or personal decisions during the next 24 hours.  · To prevent injury or falls, use caution when standing and walking for at least 24 hours after your procedure.  Note: Your healthcare provider may tell you not to take any medicine by mouth for pain or sleep in the next 4 hours. These medicines may react with the medicines you were given in the hospital. This could cause a much stronger response than usual.  Follow-up care  Follow up with your healthcare provider if you are not alert and back to your usual level of activity within 12 hours.  When to seek medical advice  Call your healthcare provider right away if any of these occur:  · Drowsiness gets worse  · Weakness or dizziness gets worse  · Repeated vomiting  · You can't be awakened  · Fever  · New rash  [employer] last reviewed this educational content on 9/1/2019  © 3428-7758 [employer]. [address] [place] [address]. All rights reserved. This information is not intended as a substitute for professional medical care. Always follow your healthcare professional's instructions.          PROBIOTICS:  Now that your colon is so cleaned out, now is a good time for a round of PROBIOTICS.  Eat a container of Greek Yogurt, such as OIKOS or CHOBANI,  Or Activia or Dannon    Greek Yogurt.    Or Take a similar Probiotic product such as Align or Culturelle  or Jazlyn-Q, every day for a month.                  (The products listed are non-prescription, but you may need to ask the pharmacist for their location.)  Repeat this at least 5-6 times a year.          High-Fiber Diet  Fiber is in fruits, vegetables, cereals, and grains. Fiber passes through your body undigested. A high-fiber diet helps food move through your intestinal tract. The added bulk is helpful in preventing constipation. In people with diverticulosis, fiber helps clean out the pouches along the colon wall. It also prevents new pouches from forming. A high-fiber diet reduces the risk of colon cancer. It also lowers blood cholesterol and prevents high blood sugar in people with diabetes.    The fiber-rich foods listed below should be part of your diet. If you are not used to high-fiber foods, start with 1 or 2 foods from this list. Every 3 to 4 days add a new one to your diet. Do this until you are eating 4 high-fiber foods per day. This should give you 20 to 35 grams of fiber a day. It is also important to drink a lot of water when you are on this diet. You should have 6 to 8 glasses of water a day. Water makes the fiber swell and increases the benefit.  Foods high in dietary fiber  The following foods are high in dietary fiber:  · Breads. Breads made with 100% whole-wheat flour; masha, wheat, or rye crackers; whole-grain tortillas, bran muffins.  · Cereals. Whole-grain and bran cereals with bran (shredded wheat, wheat flakes, raisin bran, corn bran); oatmeal, rolled oats, granola, and brown rice.  · Fruits. Fresh fruits and their edible skins (pears, prunes, raisins, berries, apples, and apricots); bananas, citrus fruit, mangoes, pineapple; and prune juice.  · Nuts. Any nuts and seeds.  · Vegetables. Best served raw or lightly cooked. All types, especially: green peas, celery, eggplant, potatoes, spinach, broccoli, [place] sprouts, winter squash, carrots, cauliflower, soybeans, lentils, and fresh and  dried beans of all kinds.  · Other. Popcorn, any spices.  Date Last Reviewed: 8/1/2016  © 3628-0842 [employer]. [address] [place] [address], [place] [address]. All rights reserved. This information is not intended as a substitute for professional medical care. Always follow your healthcare professional's instructions.

## 2020-07-20 NOTE — ANESTHESIA PREPROCEDURE EVALUATION
07/20/2020  Georgia Gomez is a 55 y.o., female.    Anesthesia Evaluation          Review of Systems  Anesthesia Hx:  No problems with previous Anesthesia   Social:  Non-Smoker    Cardiovascular:   Exercise tolerance: good    Pulmonary:   Sleep Apnea, CPAP    Renal/:  Renal/ Normal     Hepatic/GI:   Bowel Prep. Denies GERD.    Musculoskeletal:   Arthritis     Neurological:   Neuromuscular Disease, Headaches   Peripheral Neuropathy    Endocrine:  Endocrine Normal        Physical Exam  General:  Well nourished    Airway/Jaw/Neck:  Airway Findings: Mouth Opening: Normal Tongue: Normal  General Airway Assessment: Adult  Mallampati: II  TM Distance: Normal, at least 6 cm       Chest/Lungs:  Chest/Lungs Clear    Heart/Vascular:  Heart Findings: Normal            Anesthesia Plan  Type of Anesthesia, risks & benefits discussed:  Anesthesia Type:  general  Patient's Preference:   Intra-op Monitoring Plan: standard ASA monitors  Intra-op Monitoring Plan Comments:   Post Op Pain Control Plan: IV/PO Opioids PRN  Post Op Pain Control Plan Comments:   Induction:   IV  Beta Blocker:  Patient is not currently on a Beta-Blocker (No further documentation required).       Informed Consent: Patient understands risks and agrees with Anesthesia plan.  Questions answered. Anesthesia consent signed with patient.  ASA Score: 2     Day of Surgery Review of History & Physical:    H&P update referred to the provider.         Ready For Surgery From Anesthesia Perspective.

## 2020-07-20 NOTE — BRIEF OP NOTE
Discharge Note  Short Stay      SUMMARY     Admit Date: 7/20/2020    Attending Physician: Lalo Jimenez Jr., MD     Discharge Physician: Lalo Jimenez Jr., MD    Discharge Date: 7/20/2020 11:51 AM    Final Diagnosis: Screening [Z13.9]  Impression:          - Non-bleeding internal hemorrhoids.                        - Redundant colon.                        - The examination was otherwise normal.                        - The examined portion of the ileum was normal.                        - No specimens collected.   Recommendation:      - Discharge patient to home.                        - High fiber diet.                        - Use fiber, for example Citrucel, Fibercon, Konsyl                        or Metamucil.                        - Take a PROBIOTIC, such as a carton of GREEK YOGURT                        (Chobani or Oikos, or Activia or Dannon); or tablets                        of ALIGN or CULTURELLE or VASU-Q (all                        non-prescription), every day for a month.                        - Repeat colonoscopy in 10 years for screening                        purposes.                        - Continue present medications.                        - Patient has a contact number available for                        emergencies. The signs and symptoms of potential                        delayed complications were discussed with the                        patient. Return to normal activities tomorrow.                        Written discharge instructions were provided to the                        patient.                        - Return to normal activities tomorrow.   Lalo Jimenez MD   7/20/2020   Disposition: HOME OR SELF CARE    Patient Instructions:   Current Discharge Medication List      CONTINUE these medications which have NOT CHANGED    Details   biotin 5,000 mcg TbDL Take 1 tablet by mouth once daily.       buPROPion (WELLBUTRIN SR) 200 MG SR12 TAKE 1 TABLET BY MOUTH TWICE A  DAY  Qty: 180 tablet, Refills: 1      estradiol 0.05 mg/24 hr td ptsw (VIVELLE-DOT) 0.05 mg/24 hr Place 1 patch onto the skin twice a week.       hydrocortisone (PROCTOZONE-HC) 2.5 % rectal cream Place rectally 2 (two) times daily as needed for Hemorrhoids. PLACE RECTALLY TWICE DAILY AS DIRECTED  Qty: 30 g, Refills: 0      multivitamin (ONE DAILY MULTIVITAMIN) per tablet Take 1 tablet by mouth once daily.      vitamin D 1000 units Tab Take 1,000 Units by mouth once daily.             Discharge Procedure Orders (must include Diet, Follow-up, Activity)    Follow Up:  Follow up with PCP as per your routine.  Please follow a high fiber diet.  Activity as tolerated.    No driving day of procedure.    PROBIOTICS:  Now that your colon is so cleaned out, now is a good time for a round of PROBIOTICS.  Eat a container of Greek Yogurt, such as OIKOS or CHOBANI,  Or Activia or Dannon    Greek Yogurt.    Or Take a similar Probiotic product such as Align or Culturelle or Jazlyn-Q, every day for a month.                  (The products listed are non-prescription, but you may need to ask the pharmacist for their location.)  Repeat this at least 5-6 times a year.

## 2020-07-20 NOTE — ANESTHESIA POSTPROCEDURE EVALUATION
Anesthesia Post Evaluation    Patient: Georgia Gomez    Procedure(s) Performed: Procedure(s) (LRB):  COLONOSCOPY (N/A)    Final Anesthesia Type: general    Patient location during evaluation: PACU  Patient participation: Yes- Able to Participate  Level of consciousness: awake and alert and oriented  Post-procedure vital signs: reviewed and stable  Pain management: adequate  Airway patency: patent    PONV status at discharge: No PONV  Anesthetic complications: no      Cardiovascular status: blood pressure returned to baseline  Respiratory status: unassisted, spontaneous ventilation and room air  Hydration status: euvolemic  Follow-up not needed.          Vitals Value Taken Time   BP 90/48 07/20/20 1137   Temp 36.8 °C (98.2 °F) 07/20/20 1137   Pulse 71 07/20/20 1137   Resp 14 07/20/20 1137   SpO2 100 % 07/20/20 1137         No case tracking events are documented in the log.      Pain/Kasia Score: Kasia Score: 9 (7/20/2020 11:46 AM)

## 2020-07-20 NOTE — H&P
History & Physical - Short Stay  Gastroenterology      SUBJECTIVE:     Procedure: Colonoscopy    Chief Complaint/Indication for Procedure: Screening    History of Present Illness:  Asymptomatic    Office Visit    6/17/2020  [facility]     Grace Cuello MD  Family Medicine  Routine general medical examination at a health care facility +3 more  Dx  Annual Exam  Reason for Visit      Progress Notes  Grace Cuello MD (Physician)   Family Medicine   6/17/2020  8:40 AM   Signed     Subjective:       Patient ID: Georgia Gomez is a 55 y.o. female.     Chief Complaint: Annual Exam       Georgia Gomez is in the office for annual exam.     HPI  No updates to medical hx.       Past Medical History:   Diagnosis Date    Arthritis      Headache(784.0)      Rash       Notes that she's getting leg pains at night. Not a muscle cramp, but can wake her up. R>L. Extends from quad to mid calf. No leg swelling or color change. Occurring for at least several months.   We previously decreased her wellbutrin from 200mg bid to 150bid bc of palpitations. They still occur occasionally.    Current Outpatient Medications:     biotin 5,000 mcg TbDL, Take 1 tablet by mouth once daily. , Disp: , Rfl:     buPROPion (WELLBUTRIN SR) 200 MG SR12, TAKE 1 TABLET BY MOUTH TWICE A DAY, Disp: 180 tablet, Rfl: 1    estradiol 0.05 mg/24 hr td ptsw (VIVELLE-DOT) 0.05 mg/24 hr, Place 1 patch onto the skin twice a week. , Disp: , Rfl:     multivitamin (ONE DAILY MULTIVITAMIN) per tablet, Take 1 tablet by mouth once daily., Disp: , Rfl:     vitamin D 1000 units Tab, Take 1,000 Units by mouth once daily., Disp: , Rfl:      The 10-year ASCVD risk score (Soraya TRUDY Jr., et al., 2013) is: 0.9%    Values used to calculate the score:      Age: 55 years      Sex: Female      Is Non- : No      Diabetic: No      Tobacco smoker: No      Systolic Blood Pressure: 102 mmHg      Is BP treated: No       HDL Cholesterol: 66 mg/dL      Total Cholesterol: 172 mg/dL      Screening recommendations appropriate to age and health status were reviewed.     Assessment & Plan:     Routine general medical examination at a health care facility  Comments:  Anticipatory guidance reviewed  Orders:  -     EKG 12-lead; Future     Atherosclerosis of native coronary artery without angina pectoris, unspecified whether native or transplanted heart  Comments:  Update calcium score for continued monitoring  Orders:  -     CT Cardiac Scoring; Future; Expected date: 06/17/2020     Special screening for malignant neoplasms, colon  -     Case request GI: COLONOSCOPY     Hemorrhoids, unspecified hemorrhoid type  Comments:  Continue topicals or suppository as needed for relief.  Discussed review with general surgery for definitive management should they persist     Other orders  -     hydrocortisone (PROCTOZONE-HC) 2.5 % rectal cream; Place rectally 2 (two) times daily as needed for Hemorrhoids. PLACE RECTALLY TWICE DAILY AS DIRECTED  Dispense: 30 g; Refill: 0  -     hydrocortisone (ANUSOL-HC) 25 mg suppository; Place 1 suppository (25 mg total) rectally 2 (two) times daily. for 10 days  Dispense: 20 suppository; Refill: 1          PTA Medications   Medication Sig    biotin 5,000 mcg TbDL Take 1 tablet by mouth once daily.     buPROPion (WELLBUTRIN SR) 200 MG SR12 TAKE 1 TABLET BY MOUTH TWICE A DAY    estradiol 0.05 mg/24 hr td ptsw (VIVELLE-DOT) 0.05 mg/24 hr Place 1 patch onto the skin twice a week.     hydrocortisone (PROCTOZONE-HC) 2.5 % rectal cream Place rectally 2 (two) times daily as needed for Hemorrhoids. PLACE RECTALLY TWICE DAILY AS DIRECTED    multivitamin (ONE DAILY MULTIVITAMIN) per tablet Take 1 tablet by mouth once daily.    vitamin D 1000 units Tab Take 1,000 Units by mouth once daily.       Review of patient's allergies indicates:  No Known Allergies     Past Medical History:   Diagnosis Date    Arthritis      "Headache(784.0)     Rash      Past Surgical History:   Procedure Laterality Date    HYSTERECTOMY  10/2014    uterine fibroids     Family History   Problem Relation Age of Onset    Diabetes Mother     Hypertension Mother     Cancer Father         lung ca    Heart disease Father     Hyperlipidemia Father     Hypertension Father     Cancer Sister         Non Hodgkin's lymphoma    No Known Problems Daughter     No Known Problems Son      Social History     Tobacco Use    Smoking status: Never Smoker    Smokeless tobacco: Never Used   Substance Use Topics    Alcohol use: Yes     Frequency: Monthly or less     Binge frequency: Never     Comment: occ    Drug use: No         OBJECTIVE:     Vital Signs (Most Recent)  Temp: 98.1 °F (36.7 °C) (07/20/20 1026)  Pulse: 83 (07/20/20 1026)  Resp: 16 (07/20/20 1026)  BP: (!) 110/57(Repeat BP) (07/20/20 1039)  SpO2: 97 % (07/20/20 1026)    Physical Exam:  :Ht 5' 6" (1.676 m)   Wt 74.5 kg (164 lb 2.1 oz)   BMI 26.49 kg/m²                                                       GENERAL:  Comfortable, in no acute distress.                                 HEENT EXAM:  Nonicteric.  No adenopathy.  Oropharynx is clear.               NECK:  Supple.                                                               LUNGS:  Clear.                                                               CARDIAC:  Regular rate and rhythm.  S1, S2.  No murmur.                      ABDOMEN:  Soft, positive bowel sounds, nontender.  No hepatosplenomegaly or masses.  No rebound or guarding.                                             EXTREMITIES:  No edema.     MENTAL STATUS:  Alert and oriented.    ASSESSMENT/PLAN:     Assessment: Colorectal cancer screening    Plan: Colonoscopy    Anesthesia Plan:   MAC / General Anaesthesia    ASA Grade: ASA 2 - Patient with mild systemic disease with no functional limitations    MALLAMPATI SCORE: I (soft palate, uvula, fauces, and tonsillar pillars visible)    "

## 2020-07-20 NOTE — TRANSFER OF CARE
"Anesthesia Transfer of Care Note    Patient: Georgia Gomez    Procedure(s) Performed: Procedure(s) (LRB):  COLONOSCOPY (N/A)    Patient location: PACU    Anesthesia Type: general    Transport from OR: Transported from OR on 6-10 L/min O2 by face mask with adequate spontaneous ventilation    Post pain: adequate analgesia    Post assessment: tolerated procedure well and no apparent anesthetic complications    Post vital signs: stable    Level of consciousness: awake, oriented and alert    Nausea/Vomiting: no nausea/vomiting    Complications: none    Transfer of care protocol was followed      Last vitals:   Visit Vitals  BP (!) 110/57 (BP Location: Left arm, Patient Position: Lying)   Pulse 83   Temp 36.7 °C (98.1 °F) (Skin)   Resp 16   Ht 5' 6" (1.676 m)   Wt 71.7 kg (158 lb)   LMP 10/01/2014   SpO2 97%   Breastfeeding No   BMI 25.50 kg/m²     " No Yes

## 2020-07-20 NOTE — [employer] PATIENT INSTRUCTIONS
Discharge Summary/Instructions for after Colonoscopy without   Biopsy/Polypectomy  Georgia Gomez    Monday, July 20, 2020  Lalo Jimenez MD  RESTRICTIONS ON ACTIVITY:  - Do not drive a car or operate machinery until the day after the procedure.      - The following day: return to full activity including work.  - For  3 days: No heavy lifting, straining or running.  - Diet: You may eat solid foods, but no gassy foods (i.e. beans, broccoli,   cabbage, etc).  TREATMENT FOR COMMON SIDE EFFECTS:  - Mild abdominal pain and bloating or excessive gas: rest, eat lightly and   use a heating pad.  SYMPTOMS TO WATCH FOR AND REPORT TO YOUR PHYSICIAN:  1. Severe abdominal pain.  2. Fever within 24 hours after a procedure.  3. A large amount of rectal bleeding. (A small amount of blood from the   rectum is not serious, especially if hemorrhoids are present.  3.  Because air was put into your colon during the procedure, expelling   large amounts of air from your rectum is normal.  4.  You may not have a bowel movement for 1-3 days because of the   colonoscopy prep.  This is normal.  5.  Call immediately if you notice any of the following:   Chills and/or fever over 101   Persistent vomiting   Severe abdominal pain, other than gas cramps   Severe chest pain   Black, tarry stools   Any bleeding - exceeding one tablespoon  Your doctor recommends these additional instructions:  Eat a high fiber diet.   Take a fiber supplement, for example Citrucel, Fibercon, Konsyl or   Metamucil.   Take a PROBIOTIC, such as a carton of GREEK YOGURT (Chobani or Oikos,  or   Activia or Dannon);  or tablets of ALIGN or CULTURELLE or VASU-Q (all   non-prescription), every day for a month.   And repeat this at least 4-6   times a year.  Your physician has recommended a repeat colonoscopy in 10 years for   screening purposes.  None  If you have any questions or problems, please call your physician.  EMERGENCY PHONE NUMBER: [phone]  LAB  RESULTS: Call in two (2) weeks for lab results, [phone]  ___________________________________________  Nurse Signature  ___________________________________________  Patient/Designated Responsible Party Signature  Lalo Jimenez MD  7/20/2020 11:49:38 AM  This report has been verified and signed electronically.  [employer]

## 2020-07-21 VITALS
WEIGHT: 158 LBS | HEART RATE: 70 BPM | TEMPERATURE: 98 F | SYSTOLIC BLOOD PRESSURE: 102 MMHG | DIASTOLIC BLOOD PRESSURE: 60 MMHG | RESPIRATION RATE: 13 BRPM | BODY MASS INDEX: 25.39 KG/M2 | OXYGEN SATURATION: 100 % | HEIGHT: 66 IN

## 2021-04-27 ENCOUNTER — OFFICE VISIT (OUTPATIENT)
Dept: FAMILY MEDICINE | Facility: CLINIC | Age: 56
End: 2021-04-27
Payer: COMMERCIAL

## 2021-04-27 VITALS
BODY MASS INDEX: 27.76 KG/M2 | WEIGHT: 172.75 LBS | TEMPERATURE: 98 F | HEART RATE: 72 BPM | HEIGHT: 66 IN | DIASTOLIC BLOOD PRESSURE: 76 MMHG | SYSTOLIC BLOOD PRESSURE: 116 MMHG | RESPIRATION RATE: 14 BRPM

## 2021-04-27 DIAGNOSIS — R51.9 INTRACTABLE EPISODIC HEADACHE, UNSPECIFIED HEADACHE TYPE: Primary | ICD-10-CM

## 2021-04-27 PROCEDURE — 99213 OFFICE O/P EST LOW 20 MIN: CPT | Mod: 25,S$GLB,, | Performed by: FAMILY MEDICINE

## 2021-04-27 PROCEDURE — 96372 PR INJECTION,THERAP/PROPH/DIAG2ST, IM OR SUBCUT: ICD-10-PCS | Mod: S$GLB,,, | Performed by: FAMILY MEDICINE

## 2021-04-27 PROCEDURE — 1125F PR PAIN SEVERITY QUANTIFIED, PAIN PRESENT: ICD-10-PCS | Mod: S$GLB,,, | Performed by: FAMILY MEDICINE

## 2021-04-27 PROCEDURE — 1125F AMNT PAIN NOTED PAIN PRSNT: CPT | Mod: S$GLB,,, | Performed by: FAMILY MEDICINE

## 2021-04-27 PROCEDURE — 99213 PR OFFICE/OUTPT VISIT, EST, LEVL III, 20-29 MIN: ICD-10-PCS | Mod: 25,S$GLB,, | Performed by: FAMILY MEDICINE

## 2021-04-27 PROCEDURE — 96372 THER/PROPH/DIAG INJ SC/IM: CPT | Mod: S$GLB,,, | Performed by: FAMILY MEDICINE

## 2021-04-27 PROCEDURE — 99999 PR PBB SHADOW E&M-EST. PATIENT-LVL IV: ICD-10-PCS | Mod: PBBFAC,,, | Performed by: FAMILY MEDICINE

## 2021-04-27 PROCEDURE — 99999 PR PBB SHADOW E&M-EST. PATIENT-LVL IV: CPT | Mod: PBBFAC,,, | Performed by: FAMILY MEDICINE

## 2021-04-27 PROCEDURE — 3008F BODY MASS INDEX DOCD: CPT | Mod: CPTII,S$GLB,, | Performed by: FAMILY MEDICINE

## 2021-04-27 PROCEDURE — 3008F PR BODY MASS INDEX (BMI) DOCUMENTED: ICD-10-PCS | Mod: CPTII,S$GLB,, | Performed by: FAMILY MEDICINE

## 2021-04-27 RX ORDER — IBUPROFEN 200 MG
200 TABLET ORAL EVERY 6 HOURS PRN
COMMUNITY

## 2021-04-27 RX ORDER — KETOROLAC TROMETHAMINE 30 MG/ML
30 INJECTION, SOLUTION INTRAMUSCULAR; INTRAVENOUS
Status: COMPLETED | OUTPATIENT
Start: 2021-04-27 | End: 2021-04-27

## 2021-04-27 RX ORDER — TESTOSTERONE GEL, 1% 10 MG/G
5 GEL TRANSDERMAL DAILY
COMMUNITY

## 2021-04-27 RX ORDER — BUTALBITAL, ACETAMINOPHEN AND CAFFEINE 50; 325; 40 MG/1; MG/1; MG/1
1 TABLET ORAL 2 TIMES DAILY PRN
Qty: 20 TABLET | Refills: 1 | Status: SHIPPED | OUTPATIENT
Start: 2021-04-27 | End: 2021-05-27

## 2021-04-27 RX ADMIN — KETOROLAC TROMETHAMINE 30 MG: 30 INJECTION, SOLUTION INTRAMUSCULAR; INTRAVENOUS at 10:04

## 2021-07-21 ENCOUNTER — IMMUNIZATION (OUTPATIENT)
Dept: FAMILY MEDICINE | Facility: CLINIC | Age: 56
End: 2021-07-21
Payer: COMMERCIAL

## 2021-07-21 DIAGNOSIS — Z23 NEED FOR VACCINATION: Primary | ICD-10-CM

## 2021-07-21 PROCEDURE — 0031A COVID-19,VECTOR-NR,RS-AD26,PF,0.5 ML DOSE VACCINE (JANSSEN): CPT | Mod: PBBFAC | Performed by: FAMILY MEDICINE

## 2021-07-28 ENCOUNTER — OFFICE VISIT (OUTPATIENT)
Dept: URGENT CARE | Facility: CLINIC | Age: 56
End: 2021-07-28
Payer: COMMERCIAL

## 2021-07-28 VITALS
HEART RATE: 94 BPM | WEIGHT: 165 LBS | HEIGHT: 66 IN | RESPIRATION RATE: 15 BRPM | SYSTOLIC BLOOD PRESSURE: 102 MMHG | OXYGEN SATURATION: 98 % | BODY MASS INDEX: 26.52 KG/M2 | DIASTOLIC BLOOD PRESSURE: 58 MMHG | TEMPERATURE: 98 F

## 2021-07-28 DIAGNOSIS — U07.1 COVID-19: ICD-10-CM

## 2021-07-28 DIAGNOSIS — Z11.9 SCREENING EXAMINATION FOR UNSPECIFIED INFECTIOUS DISEASE: Primary | ICD-10-CM

## 2021-07-28 DIAGNOSIS — U07.1 COVID-19 VIRUS DETECTED: ICD-10-CM

## 2021-07-28 LAB
CTP QC/QA: YES
SARS-COV-2 RDRP RESP QL NAA+PROBE: POSITIVE

## 2021-07-28 PROCEDURE — 3008F PR BODY MASS INDEX (BMI) DOCUMENTED: ICD-10-PCS | Mod: CPTII,S$GLB,, | Performed by: INTERNAL MEDICINE

## 2021-07-28 PROCEDURE — 1159F PR MEDICATION LIST DOCUMENTED IN MEDICAL RECORD: ICD-10-PCS | Mod: CPTII,S$GLB,, | Performed by: INTERNAL MEDICINE

## 2021-07-28 PROCEDURE — 99214 OFFICE O/P EST MOD 30 MIN: CPT | Mod: S$GLB,,, | Performed by: INTERNAL MEDICINE

## 2021-07-28 PROCEDURE — 3008F BODY MASS INDEX DOCD: CPT | Mod: CPTII,S$GLB,, | Performed by: INTERNAL MEDICINE

## 2021-07-28 PROCEDURE — 1160F PR REVIEW ALL MEDS BY PRESCRIBER/CLIN PHARMACIST DOCUMENTED: ICD-10-PCS | Mod: CPTII,S$GLB,, | Performed by: INTERNAL MEDICINE

## 2021-07-28 PROCEDURE — 99214 PR OFFICE/OUTPT VISIT, EST, LEVL IV, 30-39 MIN: ICD-10-PCS | Mod: S$GLB,,, | Performed by: INTERNAL MEDICINE

## 2021-07-28 PROCEDURE — U0002: ICD-10-PCS | Mod: QW,S$GLB,, | Performed by: INTERNAL MEDICINE

## 2021-07-28 PROCEDURE — U0002 COVID-19 LAB TEST NON-CDC: HCPCS | Mod: QW,S$GLB,, | Performed by: INTERNAL MEDICINE

## 2021-07-28 PROCEDURE — 1160F RVW MEDS BY RX/DR IN RCRD: CPT | Mod: CPTII,S$GLB,, | Performed by: INTERNAL MEDICINE

## 2021-07-28 PROCEDURE — 1159F MED LIST DOCD IN RCRD: CPT | Mod: CPTII,S$GLB,, | Performed by: INTERNAL MEDICINE

## 2021-07-28 RX ORDER — PRASTERONE 6.5 MG/1
INSERT VAGINAL
COMMUNITY
Start: 2021-05-12 | End: 2021-08-17

## 2021-07-29 ENCOUNTER — NURSE TRIAGE (OUTPATIENT)
Dept: ADMINISTRATIVE | Facility: CLINIC | Age: 56
End: 2021-07-29

## 2021-08-07 ENCOUNTER — NURSE TRIAGE (OUTPATIENT)
Dept: ADMINISTRATIVE | Facility: CLINIC | Age: 56
End: 2021-08-07

## 2021-08-17 ENCOUNTER — OFFICE VISIT (OUTPATIENT)
Dept: FAMILY MEDICINE | Facility: CLINIC | Age: 56
End: 2021-08-17
Payer: COMMERCIAL

## 2021-08-17 VITALS
HEART RATE: 96 BPM | RESPIRATION RATE: 16 BRPM | WEIGHT: 170 LBS | DIASTOLIC BLOOD PRESSURE: 84 MMHG | HEIGHT: 66 IN | SYSTOLIC BLOOD PRESSURE: 116 MMHG | TEMPERATURE: 99 F | BODY MASS INDEX: 27.32 KG/M2 | OXYGEN SATURATION: 98 %

## 2021-08-17 DIAGNOSIS — G93.31 POST VIRAL SYNDROME: Primary | ICD-10-CM

## 2021-08-17 PROCEDURE — 99214 OFFICE O/P EST MOD 30 MIN: CPT | Mod: S$GLB,,, | Performed by: FAMILY MEDICINE

## 2021-08-17 PROCEDURE — 3008F PR BODY MASS INDEX (BMI) DOCUMENTED: ICD-10-PCS | Mod: CPTII,S$GLB,, | Performed by: FAMILY MEDICINE

## 2021-08-17 PROCEDURE — 3008F BODY MASS INDEX DOCD: CPT | Mod: CPTII,S$GLB,, | Performed by: FAMILY MEDICINE

## 2021-08-17 PROCEDURE — 1159F PR MEDICATION LIST DOCUMENTED IN MEDICAL RECORD: ICD-10-PCS | Mod: CPTII,S$GLB,, | Performed by: FAMILY MEDICINE

## 2021-08-17 PROCEDURE — 1125F AMNT PAIN NOTED PAIN PRSNT: CPT | Mod: CPTII,S$GLB,, | Performed by: FAMILY MEDICINE

## 2021-08-17 PROCEDURE — 1160F RVW MEDS BY RX/DR IN RCRD: CPT | Mod: CPTII,S$GLB,, | Performed by: FAMILY MEDICINE

## 2021-08-17 PROCEDURE — 1159F MED LIST DOCD IN RCRD: CPT | Mod: CPTII,S$GLB,, | Performed by: FAMILY MEDICINE

## 2021-08-17 PROCEDURE — 1125F PR PAIN SEVERITY QUANTIFIED, PAIN PRESENT: ICD-10-PCS | Mod: CPTII,S$GLB,, | Performed by: FAMILY MEDICINE

## 2021-08-17 PROCEDURE — 3079F DIAST BP 80-89 MM HG: CPT | Mod: CPTII,S$GLB,, | Performed by: FAMILY MEDICINE

## 2021-08-17 PROCEDURE — 1160F PR REVIEW ALL MEDS BY PRESCRIBER/CLIN PHARMACIST DOCUMENTED: ICD-10-PCS | Mod: CPTII,S$GLB,, | Performed by: FAMILY MEDICINE

## 2021-08-17 PROCEDURE — 99999 PR PBB SHADOW E&M-EST. PATIENT-LVL IV: ICD-10-PCS | Mod: PBBFAC,,, | Performed by: FAMILY MEDICINE

## 2021-08-17 PROCEDURE — 3074F SYST BP LT 130 MM HG: CPT | Mod: CPTII,S$GLB,, | Performed by: FAMILY MEDICINE

## 2021-08-17 PROCEDURE — 3074F PR MOST RECENT SYSTOLIC BLOOD PRESSURE < 130 MM HG: ICD-10-PCS | Mod: CPTII,S$GLB,, | Performed by: FAMILY MEDICINE

## 2021-08-17 PROCEDURE — 99999 PR PBB SHADOW E&M-EST. PATIENT-LVL IV: CPT | Mod: PBBFAC,,, | Performed by: FAMILY MEDICINE

## 2021-08-17 PROCEDURE — 99214 PR OFFICE/OUTPT VISIT, EST, LEVL IV, 30-39 MIN: ICD-10-PCS | Mod: S$GLB,,, | Performed by: FAMILY MEDICINE

## 2021-08-17 PROCEDURE — 3079F PR MOST RECENT DIASTOLIC BLOOD PRESSURE 80-89 MM HG: ICD-10-PCS | Mod: CPTII,S$GLB,, | Performed by: FAMILY MEDICINE

## 2021-08-17 RX ORDER — FLUTICASONE PROPIONATE AND SALMETEROL 113; 14 UG/1; UG/1
2 POWDER, METERED RESPIRATORY (INHALATION) 2 TIMES DAILY
Qty: 1 EACH | Refills: 2 | Status: SHIPPED | OUTPATIENT
Start: 2021-08-17 | End: 2022-03-04

## 2021-08-24 ENCOUNTER — TELEPHONE (OUTPATIENT)
Dept: FAMILY MEDICINE | Facility: CLINIC | Age: 56
End: 2021-08-24

## 2021-08-24 DIAGNOSIS — Z00.00 ROUTINE GENERAL MEDICAL EXAMINATION AT A HEALTH CARE FACILITY: Primary | ICD-10-CM

## 2021-08-26 ENCOUNTER — LAB VISIT (OUTPATIENT)
Dept: LAB | Facility: HOSPITAL | Age: 56
End: 2021-08-26
Attending: FAMILY MEDICINE
Payer: COMMERCIAL

## 2021-08-26 DIAGNOSIS — Z00.00 ROUTINE GENERAL MEDICAL EXAMINATION AT A HEALTH CARE FACILITY: ICD-10-CM

## 2021-08-26 LAB
25(OH)D3+25(OH)D2 SERPL-MCNC: 52 NG/ML (ref 30–96)
ALBUMIN SERPL BCP-MCNC: 4 G/DL (ref 3.5–5.2)
ALP SERPL-CCNC: 61 U/L (ref 55–135)
ALT SERPL W/O P-5'-P-CCNC: 15 U/L (ref 10–44)
ANION GAP SERPL CALC-SCNC: 11 MMOL/L (ref 8–16)
AST SERPL-CCNC: 20 U/L (ref 10–40)
BILIRUB SERPL-MCNC: 0.5 MG/DL (ref 0.1–1)
BUN SERPL-MCNC: 19 MG/DL (ref 6–20)
CALCIUM SERPL-MCNC: 9.3 MG/DL (ref 8.7–10.5)
CHLORIDE SERPL-SCNC: 105 MMOL/L (ref 95–110)
CHOLEST SERPL-MCNC: 165 MG/DL (ref 120–199)
CHOLEST/HDLC SERPL: 3.1 {RATIO} (ref 2–5)
CO2 SERPL-SCNC: 21 MMOL/L (ref 23–29)
CREAT SERPL-MCNC: 0.7 MG/DL (ref 0.5–1.4)
ERYTHROCYTE [DISTWIDTH] IN BLOOD BY AUTOMATED COUNT: 12.1 % (ref 11.5–14.5)
EST. GFR  (AFRICAN AMERICAN): >60 ML/MIN/1.73 M^2
EST. GFR  (NON AFRICAN AMERICAN): >60 ML/MIN/1.73 M^2
GLUCOSE SERPL-MCNC: 83 MG/DL (ref 70–110)
HCT VFR BLD AUTO: 38.8 % (ref 37–48.5)
HDLC SERPL-MCNC: 54 MG/DL (ref 40–75)
HDLC SERPL: 32.7 % (ref 20–50)
HGB BLD-MCNC: 13 G/DL (ref 12–16)
LDLC SERPL CALC-MCNC: 98.6 MG/DL (ref 63–159)
MCH RBC QN AUTO: 29.4 PG (ref 27–31)
MCHC RBC AUTO-ENTMCNC: 33.5 G/DL (ref 32–36)
MCV RBC AUTO: 88 FL (ref 82–98)
NONHDLC SERPL-MCNC: 111 MG/DL
PLATELET # BLD AUTO: 241 K/UL (ref 150–450)
PMV BLD AUTO: 9.4 FL (ref 9.2–12.9)
POTASSIUM SERPL-SCNC: 3.9 MMOL/L (ref 3.5–5.1)
PROT SERPL-MCNC: 7.4 G/DL (ref 6–8.4)
RBC # BLD AUTO: 4.42 M/UL (ref 4–5.4)
SODIUM SERPL-SCNC: 137 MMOL/L (ref 136–145)
T4 FREE SERPL-MCNC: 0.77 NG/DL (ref 0.71–1.51)
TRIGL SERPL-MCNC: 62 MG/DL (ref 30–150)
TSH SERPL DL<=0.005 MIU/L-ACNC: 1.48 UIU/ML (ref 0.4–4)
VIT B12 SERPL-MCNC: 533 PG/ML (ref 210–950)
WBC # BLD AUTO: 5.39 K/UL (ref 3.9–12.7)

## 2021-08-26 PROCEDURE — 36415 COLL VENOUS BLD VENIPUNCTURE: CPT | Mod: PN | Performed by: FAMILY MEDICINE

## 2021-08-26 PROCEDURE — 84439 ASSAY OF FREE THYROXINE: CPT | Performed by: FAMILY MEDICINE

## 2021-08-26 PROCEDURE — 80061 LIPID PANEL: CPT | Performed by: FAMILY MEDICINE

## 2021-08-26 PROCEDURE — 82607 VITAMIN B-12: CPT | Performed by: FAMILY MEDICINE

## 2021-08-26 PROCEDURE — 80053 COMPREHEN METABOLIC PANEL: CPT | Performed by: FAMILY MEDICINE

## 2021-08-26 PROCEDURE — 84443 ASSAY THYROID STIM HORMONE: CPT | Performed by: FAMILY MEDICINE

## 2021-08-26 PROCEDURE — 82306 VITAMIN D 25 HYDROXY: CPT | Performed by: FAMILY MEDICINE

## 2021-08-26 PROCEDURE — 85027 COMPLETE CBC AUTOMATED: CPT | Performed by: FAMILY MEDICINE

## 2021-09-13 ENCOUNTER — TELEPHONE (OUTPATIENT)
Dept: FAMILY MEDICINE | Facility: CLINIC | Age: 56
End: 2021-09-13

## 2021-09-14 ENCOUNTER — TELEPHONE (OUTPATIENT)
Dept: FAMILY MEDICINE | Facility: CLINIC | Age: 56
End: 2021-09-14

## 2021-10-04 RX ORDER — HYDROCORTISONE 25 MG/G
CREAM TOPICAL
Qty: 28.35 G | Refills: 1 | Status: SHIPPED | OUTPATIENT
Start: 2021-10-04 | End: 2022-10-27 | Stop reason: SDUPTHER

## 2022-01-26 ENCOUNTER — TELEPHONE (OUTPATIENT)
Dept: FAMILY MEDICINE | Facility: CLINIC | Age: 57
End: 2022-01-26
Payer: COMMERCIAL

## 2022-01-26 NOTE — TELEPHONE ENCOUNTER
----- Message from Oliva Garza sent at 1/26/2022  4:02 PM CST -----  Contact: pt  Type: Needs Medical Advice  Who Called:  pt   Symptoms (please be specific):  Covid positive  Best Call Back Number: [phone]    Additional Information: please call pt, has questions regarding Covid and returning to work

## 2022-03-03 ENCOUNTER — PATIENT MESSAGE (OUTPATIENT)
Dept: FAMILY MEDICINE | Facility: CLINIC | Age: 57
End: 2022-03-03
Payer: COMMERCIAL

## 2022-03-03 ENCOUNTER — TELEPHONE (OUTPATIENT)
Dept: FAMILY MEDICINE | Facility: CLINIC | Age: 57
End: 2022-03-03
Payer: COMMERCIAL

## 2022-03-03 NOTE — TELEPHONE ENCOUNTER
----- Message from Veronica Contreras MA sent at 3/3/2022  1:47 PM CST -----  Type: Needs Medical Advice  Who Called:  pt    Best Call Back Number: [phone] (home)     Additional Information: pt scheduled with Dr Flor for 3/9 but she is having a sore throat/feels like glass when she swallows--would like to be seen sooner if possible by Dr Cuello or Dr Carson--please advise--thank you

## 2022-03-04 ENCOUNTER — OFFICE VISIT (OUTPATIENT)
Dept: FAMILY MEDICINE | Facility: CLINIC | Age: 57
End: 2022-03-04
Payer: COMMERCIAL

## 2022-03-04 DIAGNOSIS — J04.0 LARYNGITIS: ICD-10-CM

## 2022-03-04 DIAGNOSIS — J01.00 ACUTE MAXILLARY SINUSITIS, RECURRENCE NOT SPECIFIED: Primary | ICD-10-CM

## 2022-03-04 DIAGNOSIS — J02.8 PHARYNGITIS DUE TO OTHER ORGANISM: ICD-10-CM

## 2022-03-04 PROCEDURE — 99213 PR OFFICE/OUTPT VISIT, EST, LEVL III, 20-29 MIN: ICD-10-PCS | Mod: 95,,, | Performed by: NURSE PRACTITIONER

## 2022-03-04 PROCEDURE — 99213 OFFICE O/P EST LOW 20 MIN: CPT | Mod: 95,,, | Performed by: NURSE PRACTITIONER

## 2022-03-04 RX ORDER — CEFUROXIME AXETIL 500 MG/1
500 TABLET ORAL EVERY 12 HOURS
Qty: 14 TABLET | Refills: 0 | Status: SHIPPED | OUTPATIENT
Start: 2022-03-04 | End: 2022-03-11

## 2022-03-04 NOTE — Clinical Note
Grace Cuello MD,  I saw your patient today in [facility]. If you have any questions, please do not hesitate to contact me.  Thank you!  ALEX Gibson, [facility]

## 2022-03-04 NOTE — PROGRESS NOTES
"Georgia Gomez is a 56 y.o. female patient of Grace Cuello MD who presents to the clinic today for a Virtual Visit.    The patient location is: [place], [place]  The chief complaint leading to consultation is: can't talk and occ cough  Visit type: audiovisual  Total time spent with patient: 11 minutes.  Each patient to whom he or she provides medical services by telemedicine is:  (1) informed of the relationship between the physician and patient and the respective role of any other health care provider with respect to management of the patient; and (2) notified that he or she may decline to receive medical services by telemedicine and may withdraw from such care at any time.    13 minutes of total time spent on the encounter, which includes face to face time and non-face to face time preparing to see the patient (eg, review of tests), Obtaining and/or reviewing separately obtained history, Documenting clinical information in the electronic or other health record, Independently interpreting results (not separately reported) and communicating results to the patient/family/caregiver, or Care coordination (not separately reported).     HPI    Pt, who is not known to me, reports a new problem to me: had ST 3x in the same week.  This 3rd time the voice also went.  Felt like "glass" yesterday morning.  Soreness is a little better.  Has to strain to talke.  Slight runny nose--took Mucinex. .    Answers for HPI/ROS submitted by the patient on 3/4/2022  Chronicity: new  Onset: in the past 7 days  Progression since onset: gradually worsening  Pain worse on: neither  Fever: no fever  Pain - numeric: 5/10  abdominal pain: No  congestion: Yes  cough: Yes  diarrhea: No  drooling: No  ear discharge: No  ear pain: No  headaches: No  hoarse voice: Yes  neck pain: No  plugged ear sensation: Yes  shortness of breath: No  stridor: No  swollen glands: No  trouble swallowing: No  vomiting: No  strep: No  mono: No  Improvement on " treatment: no relief  Pain severity: mild        These symptoms began 2 days  ago and status is improved some..     Symptoms are + acute.    Pt denies the following symptoms:  Fever, body aches.    Aggravating factors include talking .    Relieving factors include none .    OTC Medications tried are Mucinex.    Prescription medications taken for symptoms are none.    Pertinent medical history:  Had Covid twice--had J&J vaccine. 1 mo later she was sick for a month.  6 months later she got it again--sick for a month.    Smoking status:  Nosmoker    ROS    Constitutional:   No  fever, no fatigue, no change in appetite.    Head:   No headache  Ears:   no pain.  Ears itch.  Eyes:  No sxs  Nose:   No sinus pain, some congestion, some runny nose, + post nasal drip.  Throat:  decreasing ST pain, no difficulty swallowing.    Heart:  No palpitations, chest pain.    Lungs:  No difficulty breathing, some coughing, thick green  sputum production.              Symptoms are community acquired.    GI/:  No sxs    MS:  No new bone, joint or muscle problems.    Skin:  No rashes, itching.    Past Medical History:   Diagnosis Date    Allergic rhinitis     Arthritis     Headache(784.0)     Rash        Current Outpatient Medications:     biotin 5,000 mcg TbDL, Take 1 tablet by mouth once daily. , Disp: , Rfl:     buPROPion (WELLBUTRIN SR) 200 MG SR12, TAKE 1 TABLET BY MOUTH TWICE DAILY, Disp: 180 tablet, Rfl: 1    estradiol 0.05 mg/24 hr td ptsw (VIVELLE-DOT) 0.05 mg/24 hr, Place 1 patch onto the skin twice a week. , Disp: , Rfl:     fluticasone propion-salmeteroL 113-14 mcg/actuation AePB, Inhale 2 each into the lungs 2 (two) times daily., Disp: 1 each, Rfl: 2    ibuprofen (ADVIL,MOTRIN) 200 MG tablet, Take 200 mg by mouth every 6 (six) hours as needed for Pain., Disp: , Rfl:     multivitamin (ONE DAILY MULTIVITAMIN) per tablet, Take 1 tablet by mouth once daily., Disp: , Rfl:     NIACIN ORAL, Take by mouth once daily.,  Disp: , Rfl:     PROCTOSOL HC 2.5 % rectal cream, PLACE RECTALLY 2 (TWO) TIMES DAILY AS NEEDED FOR HEMORRHOIDS. PLACE RECTALLY TWICE DAILY AS DIRECTED, Disp: 28.35 g, Rfl: 1    testosterone (ANDROGEL) 1 % (50 mg/5 gram) GlPk, Apply 5 g topically once daily., Disp: , Rfl:     vitamin D 1000 units Tab, Take 1,000 Units by mouth once daily., Disp: , Rfl:       PHYSICAL EXAM    There were no vitals filed for this visit.  Vital signs not taken per patient.  Patient's questionnaire (if available), medications & PMH all reviewed today.    Gen:  Alert, coop 56 y.o. female patient in no acute distress, is mildly ill-appearing.           Well developed, well-nourished  Psych:   Behavior and affect appropriate for the situation and setting.  HENT:   Normocephalic, atraumatic.  Symmetrical facial movements.    Eyes without visible discharge or swelling.  No pain on palpation to frontal sinuses on patient self-exam.  No pain on palpation to maxillary sinuses on patient self-exam.  No sneezing during the visit.  Voice with hoarseness noted.  Resp:   Respiratory effort symmetrical.  No retractions  No increased work of breathing  No audible wheezes  Talks in full sentences.  Some coughing during the interaction today.  CV:   No isidro oral cyanosis  MSK:  Head and upper extremity movements smooth and even.  Neuro:  Responds promptly to questions showing good insight.  Skin:   No observed rashes/bruises    Acute maxillary sinusitis, recurrence not specified    Laryngitis    Pharyngitis due to other organism  -     cefUROXime (CEFTIN) 500 MG tablet; Take 1 tablet (500 mg total) by mouth every 12 (twelve) hours. for 7 days  Dispense: 14 tablet; Refill: 0        Pt today presents with ST that started about a week ago, resolved x 2 and is back now and getting worse.  Also has considerable sinus symptoms with d/c and pressure. .  Additionally, no fever, earache.      This is a new problem to me.  No work up is planned.        Pt  advised to perform comfort measures recommended on patient instruction sheet, which were reviewed at the time of the visit..    If not better in 3 days, the patient is advised to call here, PCP office or go for an in-person/follow up evaluation.  If worse or concerns, the patient is advised to call for advise to this office or the PCP office or call [facility] or go to the nearest URGENT CARE or ER.  Explained exam findings, diagnosis and treatment plan to patient.  Questions answered and patient states understanding.

## 2022-03-05 NOTE — PATIENT INSTRUCTIONS
"Your symptoms today are consistent with recurrent sore throat and sinus symptoms.  Because it has come and gone over the past week, it could be a bacterial infection, as this is more typical of a bacterium, coming in after a virus has already invaded.    Ceftin antibiotic prescribed for the infection.  Start it today.  If you get stomach upset from antibiotics, try taking probiotic or eating a yogurt with active culture to prevent stomach upset while on the antibiotic.    Comfort measures:  Increase fluids  Get plenty of rest  Vaporizer or frequent showers may be helpful.  Take tylenol of ibuprofen as needed for pain or fever  For thick mucus secretions, you can take over-the-counter guaifenesin (Mucinex, plain Robitussin).  Drink plenty of water while taking this to help loosen mucus.  To suppress the cough you may use a cough product with dextromethorphan (delsym or a product with DM designation).  The following is also prescribed for the cough benzonatate  If you have high blood pressure or atrial fibrillation, avoid any over the counter medications with "D" or decongestant.  (Medications with "DM", dextromethorphan, are ok to take if you have heart problems.)    Salt water gargles.  Saline nasal spray  Wash cloth with warm water placed over the sinus area can lessen discomfort and pressure.  Sleep with head of bed elevated to decrease drainage, cough and congestion.    I recommend frequent handwashing with soap under running water for 20 seconds to limit spread of infection to others     If you are not better in 3 days, if worse or you have concerns or questions, please do not hesitate to call.  You can reach us at [phone] Monday through Friday 7 a.m. to 6:30 p.m.  Saturday 9 a.m. to 4:30 p.m. Sunday 9 a.m. to 2:30 p.m. (except holidays). Or call Grace Cuello MD's office.    Evenings and weekends [facility] is also available if symptoms worsen or fail to improve.  When you call the number, a " registered nurse answers and takes your information.  The nurse can look at your medical record and make recommendations or call the on call physician, if warranted.     There are many urgent cares available for [facility] patients.  Use this link to find the locations nearest you:    [email or web address]    [facility] include:    Urgent Cares associated with [facility] are open M-F evenings and on the weekends, as well.    [facility] Address: [address] [place] [address] Phone: [phone]    [facility] Address:  [address] [place] [address] [place] [address] Phone: [phone]    [facility] Address:  [address] [place], [place] [address] [address] Phone: [phone]    Thank you for using the [facility]!    Lana Devi, ESTELA, CNP, FNP-BC  [facility]

## 2022-03-24 ENCOUNTER — TELEPHONE (OUTPATIENT)
Dept: FAMILY MEDICINE | Facility: CLINIC | Age: 57
End: 2022-03-24
Payer: COMMERCIAL

## 2022-03-24 RX ORDER — METHYLPREDNISOLONE 4 MG/1
TABLET ORAL
Qty: 21 EACH | Refills: 0 | Status: SHIPPED | OUTPATIENT
Start: 2022-03-24 | End: 2022-04-14

## 2022-03-24 NOTE — TELEPHONE ENCOUNTER
Spoke with pt, she is c/o sore throat and cough for last several weeks  She had a VV with Lana Devi on 03/04/22,  who gave her cefuroxime 500mg q12h  She had brief improvement but these sx returned  She is taking claritin daily thinking it could be allergies  She is asking what she should do next

## 2022-03-24 NOTE — TELEPHONE ENCOUNTER
----- Message from Adelita Chavez sent at 3/24/2022  8:42 AM CDT -----  Contact: pt  Type:  Same Day Appointment Request    Caller is requesting a same day appointment.  Caller declined first available appointment listed below.      Name of Caller:  pt  When is the first available appointment?  april  Symptoms:  lump in throat horrible cough  Best Call Back Number:  [phone]   Additional Information: need to be seen today please

## 2022-06-01 ENCOUNTER — OFFICE VISIT (OUTPATIENT)
Dept: URGENT CARE | Facility: CLINIC | Age: 57
End: 2022-06-01
Payer: COMMERCIAL

## 2022-06-01 ENCOUNTER — NURSE TRIAGE (OUTPATIENT)
Dept: ADMINISTRATIVE | Facility: CLINIC | Age: 57
End: 2022-06-01
Payer: COMMERCIAL

## 2022-06-01 VITALS
RESPIRATION RATE: 16 BRPM | TEMPERATURE: 97 F | HEART RATE: 78 BPM | WEIGHT: 169 LBS | HEIGHT: 66 IN | SYSTOLIC BLOOD PRESSURE: 110 MMHG | DIASTOLIC BLOOD PRESSURE: 60 MMHG | OXYGEN SATURATION: 98 % | BODY MASS INDEX: 27.16 KG/M2

## 2022-06-01 DIAGNOSIS — Z91.038 ALLERGIC REACTION TO INSECT BITE: Primary | ICD-10-CM

## 2022-06-01 PROCEDURE — 3074F SYST BP LT 130 MM HG: CPT | Mod: CPTII,S$GLB,, | Performed by: INTERNAL MEDICINE

## 2022-06-01 PROCEDURE — 96372 PR INJECTION,THERAP/PROPH/DIAG2ST, IM OR SUBCUT: ICD-10-PCS | Mod: S$GLB,,, | Performed by: INTERNAL MEDICINE

## 2022-06-01 PROCEDURE — 99214 OFFICE O/P EST MOD 30 MIN: CPT | Mod: 25,S$GLB,, | Performed by: INTERNAL MEDICINE

## 2022-06-01 PROCEDURE — 1159F PR MEDICATION LIST DOCUMENTED IN MEDICAL RECORD: ICD-10-PCS | Mod: CPTII,S$GLB,, | Performed by: INTERNAL MEDICINE

## 2022-06-01 PROCEDURE — 3078F DIAST BP <80 MM HG: CPT | Mod: CPTII,S$GLB,, | Performed by: INTERNAL MEDICINE

## 2022-06-01 PROCEDURE — 3008F BODY MASS INDEX DOCD: CPT | Mod: CPTII,S$GLB,, | Performed by: INTERNAL MEDICINE

## 2022-06-01 PROCEDURE — 3074F PR MOST RECENT SYSTOLIC BLOOD PRESSURE < 130 MM HG: ICD-10-PCS | Mod: CPTII,S$GLB,, | Performed by: INTERNAL MEDICINE

## 2022-06-01 PROCEDURE — 3008F PR BODY MASS INDEX (BMI) DOCUMENTED: ICD-10-PCS | Mod: CPTII,S$GLB,, | Performed by: INTERNAL MEDICINE

## 2022-06-01 PROCEDURE — 3078F PR MOST RECENT DIASTOLIC BLOOD PRESSURE < 80 MM HG: ICD-10-PCS | Mod: CPTII,S$GLB,, | Performed by: INTERNAL MEDICINE

## 2022-06-01 PROCEDURE — 96372 THER/PROPH/DIAG INJ SC/IM: CPT | Mod: S$GLB,,, | Performed by: INTERNAL MEDICINE

## 2022-06-01 PROCEDURE — 99214 PR OFFICE/OUTPT VISIT, EST, LEVL IV, 30-39 MIN: ICD-10-PCS | Mod: 25,S$GLB,, | Performed by: INTERNAL MEDICINE

## 2022-06-01 PROCEDURE — 1159F MED LIST DOCD IN RCRD: CPT | Mod: CPTII,S$GLB,, | Performed by: INTERNAL MEDICINE

## 2022-06-01 RX ORDER — DEXAMETHASONE SODIUM PHOSPHATE 100 MG/10ML
10 INJECTION INTRAMUSCULAR; INTRAVENOUS
Status: COMPLETED | OUTPATIENT
Start: 2022-06-01 | End: 2022-06-01

## 2022-06-01 RX ORDER — PREDNISONE 20 MG/1
20 TABLET ORAL DAILY
Qty: 5 TABLET | Refills: 0 | Status: SHIPPED | OUTPATIENT
Start: 2022-06-02 | End: 2022-06-07

## 2022-06-01 RX ADMIN — DEXAMETHASONE SODIUM PHOSPHATE 10 MG: 100 INJECTION INTRAMUSCULAR; INTRAVENOUS at 09:06

## 2022-06-01 NOTE — PROGRESS NOTES
"Subjective:       Patient ID: Georgia Gomez is a 57 y.o. female.    Vitals:  height is 5' 6" (1.676 m) and weight is 76.7 kg (169 lb). Her oral temperature is 97 °F (36.1 °C). Her blood pressure is 110/60 and her pulse is 78. Her respiration is 16 and oxygen saturation is 98%.     Chief Complaint: Insect Bite    Pt present today with Bug Bite to Lt Hand. Noticed 2-3 days ago.    Hand Injury   Her dominant hand is their left hand. The incident occurred at home. The pain is present in the left hand. The quality of the pain is described as aching. The pain does not radiate. The pain is at a severity of 0/10. The patient is experiencing no pain. The pain has been constant since the incident. Pertinent negatives include no chest pain, muscle weakness, numbness or tingling. Nothing aggravates the symptoms. She has tried nothing for the symptoms.       Cardiovascular: Negative for chest pain.   Skin: Positive for erythema (left hand swelling).   Neurological: Negative for numbness.       Objective:      Physical Exam   Constitutional: She is oriented to person, place, and time. She appears well-developed. She is cooperative.  Non-toxic appearance. She does not appear ill. No distress.   HENT:   Head: Normocephalic and atraumatic.   Ears:   Right Ear: Hearing, tympanic membrane, external ear and ear canal normal.   Left Ear: Hearing, tympanic membrane, external ear and ear canal normal.   Nose: Nose normal. No mucosal edema, rhinorrhea or nasal deformity. No epistaxis. Right sinus exhibits no maxillary sinus tenderness and no frontal sinus tenderness. Left sinus exhibits no maxillary sinus tenderness and no frontal sinus tenderness.   Mouth/Throat: Uvula is midline, oropharynx is clear and moist and mucous membranes are normal. No trismus in the jaw. Normal dentition. No uvula swelling. No oropharyngeal exudate, posterior oropharyngeal edema or posterior oropharyngeal erythema.   Eyes: Conjunctivae and lids are normal. No " scleral icterus.   Neck: Trachea normal and phonation normal. Neck supple. No edema present. No erythema present. No neck rigidity present.   Cardiovascular: Normal rate, regular rhythm, normal heart sounds and normal pulses.   Pulmonary/Chest: Effort normal and breath sounds normal. No respiratory distress. She has no decreased breath sounds. She has no rhonchi.   Abdominal: Normal appearance.   Musculoskeletal: Normal range of motion.         General: No deformity. Normal range of motion.   Neurological: She is alert and oriented to person, place, and time. She exhibits normal muscle tone. Coordination normal.   Skin: Skin is warm, dry, intact, not diaphoretic, not pale and rash. erythema (left hand swelling)   Psychiatric: Her speech is normal and behavior is normal. Judgment and thought content normal.   Nursing note and vitals reviewed.        Assessment:       1. Allergic reaction to insect bite          Plan:         Allergic reaction to insect bite  -     dexamethasone injection 10 mg  -     predniSONE (DELTASONE) 20 MG tablet; Take 1 tablet (20 mg total) by mouth once daily. for 5 days  Dispense: 5 tablet; Refill: 0      Patient Instructions   If your condition worsens we recommend that you receive another evaluation at the emergency room immediately or contact your primary medical clinics after hours call service to discuss your concerns. You must understand that you've received an Urgent Care treatment only and that you may be released before all of your medical problems are known or treated. You, the patient, will arrange for follow up care as instructed.  Drink plenty of Fluids  Wash hands frequently using mild antibacterial soap lathering for at least 15 seconds then rinse  Get plenty of Rest  Follow up in 1-2 weeks with Primary Care physician if not significantly better.   If you are not allergic please take Tylenol every 4-6 hours as needed and/or Ibuprofen every 6-8 hours as needed, over the counter  for pain or fever.      My notes were dictated with [employer] Fluency Software. Any misspellings or nonsensical grammar should be attributed to its use and allowances made for errors and typographic syntactical error(s).

## 2022-10-18 ENCOUNTER — PATIENT OUTREACH (OUTPATIENT)
Dept: ADMINISTRATIVE | Facility: HOSPITAL | Age: 57
End: 2022-10-18
Payer: COMMERCIAL

## 2022-10-27 ENCOUNTER — OFFICE VISIT (OUTPATIENT)
Dept: FAMILY MEDICINE | Facility: CLINIC | Age: 57
End: 2022-10-27
Payer: COMMERCIAL

## 2022-10-27 VITALS
HEART RATE: 66 BPM | HEIGHT: 66 IN | BODY MASS INDEX: 27.93 KG/M2 | DIASTOLIC BLOOD PRESSURE: 64 MMHG | WEIGHT: 173.81 LBS | SYSTOLIC BLOOD PRESSURE: 104 MMHG

## 2022-10-27 DIAGNOSIS — M54.16 LUMBAR RADICULOPATHY: ICD-10-CM

## 2022-10-27 DIAGNOSIS — Z00.00 ROUTINE GENERAL MEDICAL EXAMINATION AT A HEALTH CARE FACILITY: Primary | ICD-10-CM

## 2022-10-27 DIAGNOSIS — E66.3 OVERWEIGHT: ICD-10-CM

## 2022-10-27 DIAGNOSIS — R07.89 CHEST DISCOMFORT: ICD-10-CM

## 2022-10-27 PROBLEM — U07.1 COVID-19: Status: RESOLVED | Noted: 2021-07-28 | Resolved: 2022-10-27

## 2022-10-27 PROCEDURE — 1160F PR REVIEW ALL MEDS BY PRESCRIBER/CLIN PHARMACIST DOCUMENTED: ICD-10-PCS | Mod: CPTII,S$GLB,, | Performed by: FAMILY MEDICINE

## 2022-10-27 PROCEDURE — 99999 PR PBB SHADOW E&M-EST. PATIENT-LVL III: CPT | Mod: PBBFAC,,, | Performed by: FAMILY MEDICINE

## 2022-10-27 PROCEDURE — 93005 ELECTROCARDIOGRAM TRACING: CPT | Mod: S$GLB,,, | Performed by: FAMILY MEDICINE

## 2022-10-27 PROCEDURE — 1159F MED LIST DOCD IN RCRD: CPT | Mod: CPTII,S$GLB,, | Performed by: FAMILY MEDICINE

## 2022-10-27 PROCEDURE — 99213 OFFICE O/P EST LOW 20 MIN: CPT | Mod: S$GLB,,, | Performed by: FAMILY MEDICINE

## 2022-10-27 PROCEDURE — 93005 EKG 12-LEAD: ICD-10-PCS | Mod: S$GLB,,, | Performed by: FAMILY MEDICINE

## 2022-10-27 PROCEDURE — 93010 ELECTROCARDIOGRAM REPORT: CPT | Mod: S$GLB,,, | Performed by: INTERNAL MEDICINE

## 2022-10-27 PROCEDURE — 1160F RVW MEDS BY RX/DR IN RCRD: CPT | Mod: CPTII,S$GLB,, | Performed by: FAMILY MEDICINE

## 2022-10-27 PROCEDURE — 99213 PR OFFICE/OUTPT VISIT, EST, LEVL III, 20-29 MIN: ICD-10-PCS | Mod: S$GLB,,, | Performed by: FAMILY MEDICINE

## 2022-10-27 PROCEDURE — 3078F DIAST BP <80 MM HG: CPT | Mod: CPTII,S$GLB,, | Performed by: FAMILY MEDICINE

## 2022-10-27 PROCEDURE — 1159F PR MEDICATION LIST DOCUMENTED IN MEDICAL RECORD: ICD-10-PCS | Mod: CPTII,S$GLB,, | Performed by: FAMILY MEDICINE

## 2022-10-27 PROCEDURE — 3074F SYST BP LT 130 MM HG: CPT | Mod: CPTII,S$GLB,, | Performed by: FAMILY MEDICINE

## 2022-10-27 PROCEDURE — 99999 PR PBB SHADOW E&M-EST. PATIENT-LVL III: ICD-10-PCS | Mod: PBBFAC,,, | Performed by: FAMILY MEDICINE

## 2022-10-27 PROCEDURE — 93010 EKG 12-LEAD: ICD-10-PCS | Mod: S$GLB,,, | Performed by: INTERNAL MEDICINE

## 2022-10-27 PROCEDURE — 3074F PR MOST RECENT SYSTOLIC BLOOD PRESSURE < 130 MM HG: ICD-10-PCS | Mod: CPTII,S$GLB,, | Performed by: FAMILY MEDICINE

## 2022-10-27 PROCEDURE — 3078F PR MOST RECENT DIASTOLIC BLOOD PRESSURE < 80 MM HG: ICD-10-PCS | Mod: CPTII,S$GLB,, | Performed by: FAMILY MEDICINE

## 2022-10-27 RX ORDER — HYDROCORTISONE ACETATE 25 MG/1
25 SUPPOSITORY RECTAL 2 TIMES DAILY
Qty: 20 SUPPOSITORY | Refills: 0 | Status: SHIPPED | OUTPATIENT
Start: 2022-10-27 | End: 2022-11-06

## 2022-10-27 RX ORDER — BUPROPION HYDROCHLORIDE 200 MG/1
TABLET, EXTENDED RELEASE ORAL
Qty: 180 TABLET | Refills: 3 | Status: SHIPPED | OUTPATIENT
Start: 2022-10-27 | End: 2023-08-08

## 2022-10-27 RX ORDER — HYDROCORTISONE 25 MG/G
CREAM TOPICAL
Qty: 28.35 G | Refills: 1 | Status: SHIPPED | OUTPATIENT
Start: 2022-10-27

## 2022-10-27 NOTE — PROGRESS NOTES
Subjective:       Patient ID: Georgia Gomez is a 57 y.o. female.    Chief Complaint: Annual Exam and Hair Loss (Hair loss x few mo. Would like to check thyroid.)      Georgia Gomez is in the office for annual exam.    HPI  Medical hx reviewed.  Past Medical History:   Diagnosis Date    Allergic rhinitis     Arthritis     Headache(784.0)     Rash      She notes some concern re hair thinning. Initially noticed after covid in Jan. Has been taking biotin. Lab in gyn/silva office with normal tsh, but testosterone was a little high.    1 episode of racing heart beat when she was driving. Relatively infrequent, but last month it occurred several times. She might also sometimes noticed a pinch sensation in the mid chest.   No hx of gerd, but not sure if that's what she's experiencing.     Sciatic notch pain. Saw pain mgmt in 2020 re lumbar radicular sx. MRI consistent.     Current Outpatient Medications:     estradiol 0.05 mg/24 hr td ptsw (VIVELLE-DOT) 0.05 mg/24 hr, Place 1 patch onto the skin twice a week. , Disp: , Rfl:     testosterone (ANDROGEL) 1 % (50 mg/5 gram) GlPk, Apply 5 g topically once daily., Disp: , Rfl:     biotin 5,000 mcg TbDL, Take 1 tablet by mouth once daily. , Disp: , Rfl:     buPROPion (WELLBUTRIN SR) 200 MG SR12, TAKE 1 TABLET BY MOUTH TWICE DAILY, Disp: 180 tablet, Rfl: 3    hydrocortisone (ANUSOL-HC) 25 mg suppository, Place 1 suppository (25 mg total) rectally 2 (two) times daily. for 10 days, Disp: 20 suppository, Rfl: 0    hydrocortisone (PROCTOSOL HC) 2.5 % rectal cream, PLACE RECTALLY 2 (TWO) TIMES DAILY AS NEEDED FOR HEMORRHOIDS. PLACE RECTALLY TWICE DAILY AS DIRECTED Strength: 2.5 %, Disp: 28.35 g, Rfl: 1    ibuprofen (ADVIL,MOTRIN) 200 MG tablet, Take 200 mg by mouth every 6 (six) hours as needed for Pain., Disp: , Rfl:     multivitamin (THERAGRAN) per tablet, Take 1 tablet by mouth once daily., Disp: , Rfl:     semaglutide (OZEMPIC) 0.25 mg or 0.5 mg(2 mg/1.5 mL) pen injector,  Inject 0.25 mg into the skin every 7 days., Disp: 1 pen, Rfl: 5    vitamin D 1000 units Tab, Take 1,000 Units by mouth once daily., Disp: , Rfl:     The 10-year ASCVD risk score (Heath SANCHEZ, et al., 2019) is: 1.4%    Values used to calculate the score:      Age: 57 years      Sex: Female      Is Non- : No      Diabetic: No      Tobacco smoker: No      Systolic Blood Pressure: 104 mmHg      Is BP treated: No      HDL Cholesterol: 54 mg/dL      Total Cholesterol: 165 mg/dL     Lab Results   Component Value Date    HGBA1C 5.1 06/04/2020    HGBA1C 5.2 09/06/2018     Lab Results   Component Value Date    LDLCALC 98.6 08/26/2021    CREATININE 0.7 08/26/2021   Labs 2021 rev.    Review of Systems   Constitutional:  Negative for activity change and unexpected weight change.   HENT:  Negative for hearing loss, rhinorrhea and trouble swallowing.    Eyes:  Negative for discharge and visual disturbance.   Respiratory:  Negative for chest tightness and wheezing.    Cardiovascular:  Positive for chest pain and palpitations. Negative for leg swelling.   Gastrointestinal:  Positive for constipation. Negative for blood in stool, diarrhea and vomiting.   Endocrine: Negative for polydipsia and polyuria.   Genitourinary:  Negative for difficulty urinating, dysuria, hematuria and menstrual problem.   Musculoskeletal:  Negative for arthralgias, joint swelling and neck pain.        R sciatic notch pain.   L 3rd finger, DIP pain. Using topical voltaren prn.    Neurological:  Negative for weakness and headaches.   Psychiatric/Behavioral:  Negative for confusion and dysphoric mood.          Objective:      Physical Exam  Vitals and nursing note reviewed.   Constitutional:       General: She is not in acute distress.     Appearance: Normal appearance. She is well-developed.   HENT:      Head: Normocephalic and atraumatic.      Right Ear: Tympanic membrane and external ear normal.      Left Ear: Tympanic membrane and  external ear normal.      Nose: Nose normal.      Mouth/Throat:      Pharynx: No oropharyngeal exudate.   Eyes:      Conjunctiva/sclera: Conjunctivae normal.      Pupils: Pupils are equal, round, and reactive to light.   Neck:      Thyroid: No thyromegaly.   Cardiovascular:      Rate and Rhythm: Normal rate and regular rhythm.   Pulmonary:      Effort: Pulmonary effort is normal. No respiratory distress.      Breath sounds: Normal breath sounds. No wheezing.   Abdominal:      General: Bowel sounds are normal. There is no distension.      Palpations: Abdomen is soft. There is no mass.      Tenderness: There is no abdominal tenderness. There is no guarding or rebound.   Musculoskeletal:         General: Normal range of motion.      Cervical back: Normal range of motion and neck supple.      Right lower leg: No edema.      Left lower leg: No edema.   Lymphadenopathy:      Cervical: No cervical adenopathy.   Skin:     General: Skin is warm and dry.   Neurological:      General: No focal deficit present.      Mental Status: She is alert and oriented to person, place, and time.      Cranial Nerves: No cranial nerve deficit.   Psychiatric:         Mood and Affect: Mood normal.         Behavior: Behavior normal.           Screening recommendations appropriate to age and health status were reviewed.    Assessment & Plan:    Routine general medical examination at a health care facility  Comments:  anticipatory guidance reviewed  Orders:  -     Testosterone; Future; Expected date: 10/27/2022  -     TSH; Future; Expected date: 10/27/2022  -     CBC Without Differential; Future; Expected date: 10/27/2022  -     Comprehensive Metabolic Panel; Future; Expected date: 10/27/2022  -     Hemoglobin A1C; Future; Expected date: 10/27/2022  -     Lipid Panel; Future; Expected date: 10/27/2022    Chest discomfort  Comments:  update ekg and stress test  Orders:  -     EKG 12-lead; Future  -     Stress Echo Which stress agent will be used?  Treadmill Exercise; Color Flow Doppler? No; Future    Lumbar radiculopathy  Comments:  can schedule with pain/amber when ready to discuss [place]    Overweight  Comments:  encouraged healthy approach to weight with lifestyle modifications  trial ozempic, low dose  Orders:  -     semaglutide (OZEMPIC) 0.25 mg or 0.5 mg(2 mg/1.5 mL) pen injector; Inject 0.25 mg into the skin every 7 days.  Dispense: 1 pen; Refill: 5    Other orders  -     hydrocortisone (ANUSOL-HC) 25 mg suppository; Place 1 suppository (25 mg total) rectally 2 (two) times daily. for 10 days  Dispense: 20 suppository; Refill: 0  -     hydrocortisone (PROCTOSOL HC) 2.5 % rectal cream; PLACE RECTALLY 2 (TWO) TIMES DAILY AS NEEDED FOR HEMORRHOIDS. PLACE RECTALLY TWICE DAILY AS DIRECTED Strength: 2.5 %  Dispense: 28.35 g; Refill: 1  -     buPROPion (WELLBUTRIN SR) 200 MG SR12; TAKE 1 TABLET BY MOUTH TWICE DAILY  Dispense: 180 tablet; Refill: 3

## 2022-10-31 ENCOUNTER — CLINICAL SUPPORT (OUTPATIENT)
Dept: CARDIOLOGY | Facility: HOSPITAL | Age: 57
End: 2022-10-31
Attending: FAMILY MEDICINE
Payer: COMMERCIAL

## 2022-10-31 VITALS — HEIGHT: 66 IN | WEIGHT: 173 LBS | BODY MASS INDEX: 27.8 KG/M2

## 2022-10-31 DIAGNOSIS — R07.89 CHEST DISCOMFORT: ICD-10-CM

## 2022-10-31 LAB
BSA FOR ECHO PROCEDURE: 1.91 M2
CV ECHO LV RWT: 0.3 CM
CV STRESS BASE HR: 79 BPM
DIASTOLIC BLOOD PRESSURE: 65 MMHG
DOP CALC LVOT AREA: 3.4 CM2
DOP CALC LVOT DIAMETER: 2.09 CM
DOP CALC LVOT PEAK VEL: 0.97 M/S
DOP CALC LVOT STROKE VOLUME: 69.61 CM3
DOP CALCLVOT PEAK VEL VTI: 20.3 CM
E WAVE DECELERATION TIME: 130.31 MSEC
E/A RATIO: 0.82
E/E' RATIO: 6.09 M/S
ECHO LV POSTERIOR WALL: 0.73 CM (ref 0.6–1.1)
EJECTION FRACTION: 60 %
FRACTIONAL SHORTENING: 31 % (ref 28–44)
INTERVENTRICULAR SEPTUM: 0.61 CM (ref 0.6–1.1)
LA MAJOR: 3.06 CM
LA MINOR: 3.56 CM
LA WIDTH: 3.9 CM
LEFT ATRIUM SIZE: 2.76 CM
LEFT ATRIUM VOLUME INDEX: 16 ML/M2
LEFT ATRIUM VOLUME: 30.11 CM3
LEFT INTERNAL DIMENSION IN SYSTOLE: 3.41 CM (ref 2.1–4)
LEFT VENTRICLE DIASTOLIC VOLUME INDEX: 61.01 ML/M2
LEFT VENTRICLE DIASTOLIC VOLUME: 114.69 ML
LEFT VENTRICLE MASS INDEX: 56 G/M2
LEFT VENTRICLE SYSTOLIC VOLUME INDEX: 25.4 ML/M2
LEFT VENTRICLE SYSTOLIC VOLUME: 47.82 ML
LEFT VENTRICULAR INTERNAL DIMENSION IN DIASTOLE: 4.93 CM (ref 3.5–6)
LEFT VENTRICULAR MASS: 105.99 G
LV LATERAL E/E' RATIO: 5.15 M/S
LV SEPTAL E/E' RATIO: 7.44 M/S
LVOT MG: 1.96 MMHG
LVOT MV: 0.66 CM/S
MV PEAK A VEL: 0.82 M/S
MV PEAK E VEL: 0.67 M/S
MV STENOSIS PRESSURE HALF TIME: 37.79 MS
MV VALVE AREA P 1/2 METHOD: 5.82 CM2
OHS CV CPX 1 MINUTE RECOVERY HEART RATE: 127 BPM
OHS CV CPX 85 PERCENT MAX PREDICTED HEART RATE MALE: 132
OHS CV CPX ESTIMATED METS: 11
OHS CV CPX MAX PREDICTED HEART RATE: 156
OHS CV CPX PATIENT IS FEMALE: 1
OHS CV CPX PATIENT IS MALE: 0
OHS CV CPX PEAK DIASTOLIC BLOOD PRESSURE: 66 MMHG
OHS CV CPX PEAK HEAR RATE: 173 BPM
OHS CV CPX PEAK RATE PRESSURE PRODUCT: NORMAL
OHS CV CPX PEAK SYSTOLIC BLOOD PRESSURE: 166 MMHG
OHS CV CPX PERCENT MAX PREDICTED HEART RATE ACHIEVED: 111
OHS CV CPX RATE PRESSURE PRODUCT PRESENTING: 8927
RA MAJOR: 3.27 CM
SINUS: 2.76 CM
STJ: 2.77 CM
STRESS ECHO POST EXERCISE DUR MIN: 6 MINUTES
STRESS ECHO POST EXERCISE DUR SEC: 6 SECONDS
SYSTOLIC BLOOD PRESSURE: 113 MMHG
TDI LATERAL: 0.13 M/S
TDI SEPTAL: 0.09 M/S
TDI: 0.11 M/S

## 2022-10-31 PROCEDURE — 93351 STRESS TTE COMPLETE: CPT | Mod: 26,,, | Performed by: INTERNAL MEDICINE

## 2022-10-31 PROCEDURE — 93351 STRESS ECHO (CUPID ONLY): ICD-10-PCS | Mod: 26,,, | Performed by: INTERNAL MEDICINE

## 2022-10-31 PROCEDURE — 93351 STRESS TTE COMPLETE: CPT | Mod: PO

## 2022-11-01 ENCOUNTER — PATIENT MESSAGE (OUTPATIENT)
Dept: FAMILY MEDICINE | Facility: CLINIC | Age: 57
End: 2022-11-01
Payer: COMMERCIAL

## 2022-11-14 ENCOUNTER — PATIENT MESSAGE (OUTPATIENT)
Dept: FAMILY MEDICINE | Facility: CLINIC | Age: 57
End: 2022-11-14
Payer: COMMERCIAL

## 2022-11-18 RX ORDER — TIRZEPATIDE 2.5 MG/.5ML
2.5 INJECTION, SOLUTION SUBCUTANEOUS
Qty: 3 PEN | Refills: 0 | Status: SHIPPED | OUTPATIENT
Start: 2022-11-18 | End: 2022-12-09 | Stop reason: SDUPTHER

## 2022-11-18 RX ORDER — HYDROCORTISONE ACETATE 25 MG/1
25 SUPPOSITORY RECTAL 2 TIMES DAILY
Qty: 20 SUPPOSITORY | Refills: 0 | Status: SHIPPED | OUTPATIENT
Start: 2022-11-18 | End: 2022-11-28

## 2022-12-09 ENCOUNTER — PATIENT MESSAGE (OUTPATIENT)
Dept: FAMILY MEDICINE | Facility: CLINIC | Age: 57
End: 2022-12-09
Payer: COMMERCIAL

## 2022-12-09 RX ORDER — TIRZEPATIDE 2.5 MG/.5ML
2.5 INJECTION, SOLUTION SUBCUTANEOUS
Qty: 3 PEN | Refills: 1 | Status: SHIPPED | OUTPATIENT
Start: 2022-12-09 | End: 2022-12-19

## 2022-12-09 NOTE — TELEPHONE ENCOUNTER
No new care gaps identified.  [facility] Embedded Care Gaps. Reference number: [id number]. 12/09/2022   1:40:22 PM CST

## 2023-02-06 ENCOUNTER — TELEPHONE (OUTPATIENT)
Dept: FAMILY MEDICINE | Facility: CLINIC | Age: 58
End: 2023-02-06
Payer: COMMERCIAL

## 2023-02-06 ENCOUNTER — PATIENT MESSAGE (OUTPATIENT)
Dept: FAMILY MEDICINE | Facility: CLINIC | Age: 58
End: 2023-02-06
Payer: COMMERCIAL

## 2023-02-06 NOTE — TELEPHONE ENCOUNTER
Luanne Cuello!  Kinsey lost 20 lbs and wanted to discuss weaning off or doing a maintenance dose. Kinsey stayed on 2.5 and was religiously taking it Fridays for 9-10 weeks except last Friday, I didnt do it until this morning.   Please advise on dosage, do I need to do blood work or should I come in to see you?  Thank you!    Please be advise

## 2023-02-09 NOTE — TELEPHONE ENCOUNTER
Congrats on the weight loss.   There is no set protocol for discontinuing as she is already at the lowest dose. Can discontinue entirely when ready

## 2023-06-09 ENCOUNTER — OFFICE VISIT (OUTPATIENT)
Dept: FAMILY MEDICINE | Facility: CLINIC | Age: 58
End: 2023-06-09
Payer: COMMERCIAL

## 2023-06-09 ENCOUNTER — LAB VISIT (OUTPATIENT)
Dept: LAB | Facility: HOSPITAL | Age: 58
End: 2023-06-09
Attending: FAMILY MEDICINE
Payer: COMMERCIAL

## 2023-06-09 ENCOUNTER — TELEPHONE (OUTPATIENT)
Dept: AUDIOLOGY | Facility: CLINIC | Age: 58
End: 2023-06-09
Payer: COMMERCIAL

## 2023-06-09 VITALS
DIASTOLIC BLOOD PRESSURE: 62 MMHG | SYSTOLIC BLOOD PRESSURE: 104 MMHG | BODY MASS INDEX: 22.36 KG/M2 | HEART RATE: 70 BPM | HEIGHT: 66 IN | OXYGEN SATURATION: 97 % | WEIGHT: 139.13 LBS

## 2023-06-09 DIAGNOSIS — Z00.00 ROUTINE HEALTH MAINTENANCE: Primary | ICD-10-CM

## 2023-06-09 DIAGNOSIS — M85.80 OSTEOPENIA, UNSPECIFIED LOCATION: ICD-10-CM

## 2023-06-09 DIAGNOSIS — Z00.00 ROUTINE GENERAL MEDICAL EXAMINATION AT A HEALTH CARE FACILITY: ICD-10-CM

## 2023-06-09 DIAGNOSIS — M25.512 ACUTE PAIN OF LEFT SHOULDER: ICD-10-CM

## 2023-06-09 DIAGNOSIS — H57.12 EYE PAIN, LEFT: ICD-10-CM

## 2023-06-09 DIAGNOSIS — H93.11 TINNITUS OF RIGHT EAR: ICD-10-CM

## 2023-06-09 LAB
ALBUMIN SERPL BCP-MCNC: 4 G/DL (ref 3.5–5.2)
ALP SERPL-CCNC: 67 U/L (ref 55–135)
ALT SERPL W/O P-5'-P-CCNC: 17 U/L (ref 10–44)
ANION GAP SERPL CALC-SCNC: 6 MMOL/L (ref 8–16)
AST SERPL-CCNC: 21 U/L (ref 10–40)
BILIRUB SERPL-MCNC: 0.4 MG/DL (ref 0.1–1)
BUN SERPL-MCNC: 14 MG/DL (ref 6–20)
CALCIUM SERPL-MCNC: 9.4 MG/DL (ref 8.7–10.5)
CHLORIDE SERPL-SCNC: 104 MMOL/L (ref 95–110)
CHOLEST SERPL-MCNC: 159 MG/DL (ref 120–199)
CHOLEST/HDLC SERPL: 3 {RATIO} (ref 2–5)
CO2 SERPL-SCNC: 27 MMOL/L (ref 23–29)
CREAT SERPL-MCNC: 0.8 MG/DL (ref 0.5–1.4)
ERYTHROCYTE [DISTWIDTH] IN BLOOD BY AUTOMATED COUNT: 12.7 % (ref 11.5–14.5)
EST. GFR  (NO RACE VARIABLE): >60 ML/MIN/1.73 M^2
ESTIMATED AVG GLUCOSE: 97 MG/DL (ref 68–131)
GLUCOSE SERPL-MCNC: 84 MG/DL (ref 70–110)
HBA1C MFR BLD: 5 % (ref 4–5.6)
HCT VFR BLD AUTO: 39.8 % (ref 37–48.5)
HDLC SERPL-MCNC: 53 MG/DL (ref 40–75)
HDLC SERPL: 33.3 % (ref 20–50)
HGB BLD-MCNC: 12.9 G/DL (ref 12–16)
LDLC SERPL CALC-MCNC: 94 MG/DL (ref 63–159)
MCH RBC QN AUTO: 28.4 PG (ref 27–31)
MCHC RBC AUTO-ENTMCNC: 32.4 G/DL (ref 32–36)
MCV RBC AUTO: 88 FL (ref 82–98)
NONHDLC SERPL-MCNC: 106 MG/DL
PLATELET # BLD AUTO: 223 K/UL (ref 150–450)
PMV BLD AUTO: 9.7 FL (ref 9.2–12.9)
POTASSIUM SERPL-SCNC: 3.6 MMOL/L (ref 3.5–5.1)
PROT SERPL-MCNC: 7.2 G/DL (ref 6–8.4)
RBC # BLD AUTO: 4.54 M/UL (ref 4–5.4)
SODIUM SERPL-SCNC: 137 MMOL/L (ref 136–145)
TESTOST SERPL-MCNC: 41 NG/DL (ref 5–73)
TRIGL SERPL-MCNC: 60 MG/DL (ref 30–150)
TSH SERPL DL<=0.005 MIU/L-ACNC: 1.45 UIU/ML (ref 0.4–4)
WBC # BLD AUTO: 4.97 K/UL (ref 3.9–12.7)

## 2023-06-09 PROCEDURE — 1159F MED LIST DOCD IN RCRD: CPT | Mod: CPTII,S$GLB,, | Performed by: FAMILY MEDICINE

## 2023-06-09 PROCEDURE — 99396 PREV VISIT EST AGE 40-64: CPT | Mod: S$GLB,,, | Performed by: FAMILY MEDICINE

## 2023-06-09 PROCEDURE — 83036 HEMOGLOBIN GLYCOSYLATED A1C: CPT | Performed by: FAMILY MEDICINE

## 2023-06-09 PROCEDURE — 3074F PR MOST RECENT SYSTOLIC BLOOD PRESSURE < 130 MM HG: ICD-10-PCS | Mod: CPTII,S$GLB,, | Performed by: FAMILY MEDICINE

## 2023-06-09 PROCEDURE — 3008F PR BODY MASS INDEX (BMI) DOCUMENTED: ICD-10-PCS | Mod: CPTII,S$GLB,, | Performed by: FAMILY MEDICINE

## 2023-06-09 PROCEDURE — 80053 COMPREHEN METABOLIC PANEL: CPT | Performed by: FAMILY MEDICINE

## 2023-06-09 PROCEDURE — 80061 LIPID PANEL: CPT | Performed by: FAMILY MEDICINE

## 2023-06-09 PROCEDURE — 1160F RVW MEDS BY RX/DR IN RCRD: CPT | Mod: CPTII,S$GLB,, | Performed by: FAMILY MEDICINE

## 2023-06-09 PROCEDURE — 99213 PR OFFICE/OUTPT VISIT, EST, LEVL III, 20-29 MIN: ICD-10-PCS | Mod: 25,S$GLB,, | Performed by: FAMILY MEDICINE

## 2023-06-09 PROCEDURE — 36415 COLL VENOUS BLD VENIPUNCTURE: CPT | Mod: PN | Performed by: FAMILY MEDICINE

## 2023-06-09 PROCEDURE — 3008F BODY MASS INDEX DOCD: CPT | Mod: CPTII,S$GLB,, | Performed by: FAMILY MEDICINE

## 2023-06-09 PROCEDURE — 3074F SYST BP LT 130 MM HG: CPT | Mod: CPTII,S$GLB,, | Performed by: FAMILY MEDICINE

## 2023-06-09 PROCEDURE — 99396 PR PREVENTIVE VISIT,EST,40-64: ICD-10-PCS | Mod: S$GLB,,, | Performed by: FAMILY MEDICINE

## 2023-06-09 PROCEDURE — 3078F DIAST BP <80 MM HG: CPT | Mod: CPTII,S$GLB,, | Performed by: FAMILY MEDICINE

## 2023-06-09 PROCEDURE — 1159F PR MEDICATION LIST DOCUMENTED IN MEDICAL RECORD: ICD-10-PCS | Mod: CPTII,S$GLB,, | Performed by: FAMILY MEDICINE

## 2023-06-09 PROCEDURE — 99213 OFFICE O/P EST LOW 20 MIN: CPT | Mod: 25,S$GLB,, | Performed by: FAMILY MEDICINE

## 2023-06-09 PROCEDURE — 99999 PR PBB SHADOW E&M-EST. PATIENT-LVL V: CPT | Mod: PBBFAC,,, | Performed by: FAMILY MEDICINE

## 2023-06-09 PROCEDURE — 1160F PR REVIEW ALL MEDS BY PRESCRIBER/CLIN PHARMACIST DOCUMENTED: ICD-10-PCS | Mod: CPTII,S$GLB,, | Performed by: FAMILY MEDICINE

## 2023-06-09 PROCEDURE — 84443 ASSAY THYROID STIM HORMONE: CPT | Performed by: FAMILY MEDICINE

## 2023-06-09 PROCEDURE — 99999 PR PBB SHADOW E&M-EST. PATIENT-LVL V: ICD-10-PCS | Mod: PBBFAC,,, | Performed by: FAMILY MEDICINE

## 2023-06-09 PROCEDURE — 84403 ASSAY OF TOTAL TESTOSTERONE: CPT | Performed by: FAMILY MEDICINE

## 2023-06-09 PROCEDURE — 3078F PR MOST RECENT DIASTOLIC BLOOD PRESSURE < 80 MM HG: ICD-10-PCS | Mod: CPTII,S$GLB,, | Performed by: FAMILY MEDICINE

## 2023-06-09 PROCEDURE — 85027 COMPLETE CBC AUTOMATED: CPT | Performed by: FAMILY MEDICINE

## 2023-06-09 RX ORDER — ESTRADIOL 0.07 MG/D
FILM, EXTENDED RELEASE TRANSDERMAL
COMMUNITY
End: 2023-12-21

## 2023-06-09 NOTE — PROGRESS NOTES
Subjective:       Patient ID: Georgia Gomez is a 58 y.o. female.    Chief Complaint: Annual Exam      Georgia Gomez is in the office for annual exam.    HPI  Medical hx reviewed.   Past Medical History:   Diagnosis Date    Allergic rhinitis     Arthritis     Headache(784.0)     Rash      Down 30# with mounjaro, off now for a few months and feeling well. Maintaining without issue.   Upcoming colpo with gyn/silva for +HPV.   Gets sharp pains in her L eye, resolves within a minute. Occurs every few weeks now for several months. Maybe more noticeable floaters on the L.    L shoulder pain for a few weeks.     Current Outpatient Medications:     buPROPion (WELLBUTRIN SR) 200 MG SR12, TAKE 1 TABLET BY MOUTH TWICE DAILY, Disp: 180 tablet, Rfl: 3    estradioL (VIVELLE-DOT) 0.075 mg/24 hr, estradiol 0.075 mg/24 hr semiweekly transdermal patch  APPLY 1 PATCH TO SKIN TWICE WEEKLY, Disp: , Rfl:     estradiol 0.05 mg/24 hr td ptsw (VIVELLE-DOT) 0.05 mg/24 hr, Place 1 patch onto the skin twice a week. , Disp: , Rfl:     hydrocortisone (PROCTOSOL HC) 2.5 % rectal cream, PLACE RECTALLY 2 (TWO) TIMES DAILY AS NEEDED FOR HEMORRHOIDS. PLACE RECTALLY TWICE DAILY AS DIRECTED Strength: 2.5 %, Disp: 28.35 g, Rfl: 1    multivitamin (THERAGRAN) per tablet, Take 1 tablet by mouth once daily., Disp: , Rfl:     testosterone (ANDROGEL) 1 % (50 mg/5 gram) GlPk, Apply 5 g topically once daily., Disp: , Rfl:     vitamin D 1000 units Tab, Take 1,000 Units by mouth once daily., Disp: , Rfl:     biotin 5,000 mcg TbDL, Take 1 tablet by mouth once daily. , Disp: , Rfl:     ibuprofen (ADVIL,MOTRIN) 200 MG tablet, Take 200 mg by mouth every 6 (six) hours as needed for Pain., Disp: , Rfl:     tirzepatide (MOUNJARO) 2.5 mg/0.5 mL PnIj, INJECT 2.5 MG INTO THE SKIN EVERY 7 DAYS (Patient not taking: Reported on 6/9/2023), Disp: 2 pen, Rfl: 2    The 10-year ASCVD risk score ([place] DK, et al., 2019) is: 1.5%    Values used to calculate the score:       Age: 58 years      Sex: Female      Is Non- : No      Diabetic: No      Tobacco smoker: No      Systolic Blood Pressure: 104 mmHg      Is BP treated: No      HDL Cholesterol: 54 mg/dL      Total Cholesterol: 165 mg/dL     Lab Results   Component Value Date    HGBA1C 5.1 06/04/2020    HGBA1C 5.2 09/06/2018     Lab Results   Component Value Date    LDLCALC 98.6 08/26/2021    CREATININE 0.7 08/26/2021   Labs 2021 rev.     Review of Systems   Constitutional:  Negative for fatigue and unexpected weight change (down 30# with mounjaro).   HENT:  Positive for tinnitus. Negative for hearing loss, rhinorrhea and trouble swallowing.    Eyes:  Positive for pain. Negative for discharge and visual disturbance.   Respiratory:  Negative for cough and wheezing.    Cardiovascular:  Positive for palpitations (occ). Negative for chest pain and leg swelling.   Gastrointestinal:  Positive for constipation. Negative for blood in stool and diarrhea.   Endocrine: Negative for polydipsia and polyuria.   Genitourinary:  Negative for difficulty urinating, dysuria and hematuria.   Musculoskeletal:  Positive for myalgias. Negative for arthralgias, joint swelling and neck pain.        L 3rd finger, DIP pain. Using topical voltaren prn.    Neurological:  Negative for weakness and headaches.   Psychiatric/Behavioral:  Negative for dysphoric mood and sleep disturbance.        Objective:      Physical Exam  Vitals and nursing note reviewed.   Constitutional:       General: She is not in acute distress.     Appearance: Normal appearance. She is well-developed.   HENT:      Head: Normocephalic and atraumatic.      Right Ear: Tympanic membrane and external ear normal.      Left Ear: Tympanic membrane and external ear normal.      Nose: Nose normal.      Mouth/Throat:      Pharynx: No oropharyngeal exudate.   Eyes:      Conjunctiva/sclera: Conjunctivae normal.      Pupils: Pupils are equal, round, and reactive to light.   Neck:       Thyroid: No thyromegaly.   Cardiovascular:      Rate and Rhythm: Normal rate and regular rhythm.   Pulmonary:      Effort: Pulmonary effort is normal. No respiratory distress.      Breath sounds: Normal breath sounds. No wheezing.   Abdominal:      General: Bowel sounds are normal. There is no distension.      Palpations: Abdomen is soft. There is no mass.      Tenderness: There is no abdominal tenderness. There is no guarding or rebound.   Musculoskeletal:         General: Tenderness (L biceps tendon) present.      Cervical back: Neck supple.      Right lower leg: No edema.      Left lower leg: No edema.   Lymphadenopathy:      Cervical: No cervical adenopathy.   Skin:     General: Skin is warm and dry.   Neurological:      General: No focal deficit present.      Mental Status: She is alert and oriented to person, place, and time.      Cranial Nerves: No cranial nerve deficit.   Psychiatric:         Mood and Affect: Mood normal.         Behavior: Behavior normal.           Screening recommendations appropriate to age and health status were reviewed.    Assessment & Plan:    Routine health maintenance  Comments:  anticipatory guidance reviewed    Eye pain, left  -     Ambulatory referral/consult to Ophthalmology; Future; Expected date: 06/16/2023    Acute pain of left shoulder  Comments:  discussed topical nsaids, ice, rest for biceps tendonitis    Tinnitus of right ear  Comments:  flonase x 2 weeks for trial  schedule hearing eval  Orders:  -     Ambulatory referral/consult to Audiology; Future; Expected date: 06/16/2023    Osteopenia, unspecified location  Comments:  increase vit D to 4000iu/day  cont calcium, 1200mg daily  needs weight-bearing exercises

## 2023-06-09 NOTE — TELEPHONE ENCOUNTER
Called and left a message to schedule her hearing test.    ----- Message from Yeimy Chavez sent at 6/9/2023 11:23 AM CDT -----  Contact: pt [phone]  Type:  Sooner Appointment Request    Caller is requesting a sooner appointment.  Caller declined first available appointment listed below.  Caller will not accept being placed on the waitlist and is requesting a message be sent to doctor.    Name of Caller:  pt  Best Call Back Number:  [phone]    Additional Information:  Pt is calling trying to schedule an appt.Please call back and advise.

## 2023-06-26 ENCOUNTER — OFFICE VISIT (OUTPATIENT)
Dept: OPTOMETRY | Facility: CLINIC | Age: 58
End: 2023-06-26
Payer: COMMERCIAL

## 2023-06-26 ENCOUNTER — CLINICAL SUPPORT (OUTPATIENT)
Dept: AUDIOLOGY | Facility: CLINIC | Age: 58
End: 2023-06-26
Payer: COMMERCIAL

## 2023-06-26 DIAGNOSIS — H04.123 DRY EYE SYNDROME OF BILATERAL LACRIMAL GLANDS: Primary | ICD-10-CM

## 2023-06-26 DIAGNOSIS — H57.12 EYE PAIN, LEFT: ICD-10-CM

## 2023-06-26 DIAGNOSIS — H93.11 TINNITUS OF RIGHT EAR: ICD-10-CM

## 2023-06-26 DIAGNOSIS — H52.4 PRESBYOPIA OF BOTH EYES: ICD-10-CM

## 2023-06-26 DIAGNOSIS — H90.3 ASYMMETRIC SNHL (SENSORINEURAL HEARING LOSS): Primary | ICD-10-CM

## 2023-06-26 DIAGNOSIS — H43.393 VITREOUS FLOATERS OF BOTH EYES: ICD-10-CM

## 2023-06-26 PROCEDURE — 92004 PR EYE EXAM, NEW PATIENT,COMPREHESV: ICD-10-PCS | Mod: S$GLB,,,

## 2023-06-26 PROCEDURE — 99999 PR PBB SHADOW E&M-EST. PATIENT-LVL II: ICD-10-PCS | Mod: PBBFAC,,, | Performed by: AUDIOLOGIST

## 2023-06-26 PROCEDURE — 99999 PR PBB SHADOW E&M-EST. PATIENT-LVL II: CPT | Mod: PBBFAC,,, | Performed by: AUDIOLOGIST

## 2023-06-26 PROCEDURE — 3044F PR MOST RECENT HEMOGLOBIN A1C LEVEL <7.0%: ICD-10-PCS | Mod: CPTII,S$GLB,,

## 2023-06-26 PROCEDURE — 99999 PR PBB SHADOW E&M-EST. PATIENT-LVL III: CPT | Mod: PBBFAC,,,

## 2023-06-26 PROCEDURE — 1159F MED LIST DOCD IN RCRD: CPT | Mod: CPTII,S$GLB,,

## 2023-06-26 PROCEDURE — 92557 PR COMPREHENSIVE HEARING TEST: ICD-10-PCS | Mod: S$GLB,,, | Performed by: AUDIOLOGIST

## 2023-06-26 PROCEDURE — 92567 PR TYMPA2METRY: ICD-10-PCS | Mod: S$GLB,,, | Performed by: AUDIOLOGIST

## 2023-06-26 PROCEDURE — 92015 PR REFRACTION: ICD-10-PCS | Mod: S$GLB,,,

## 2023-06-26 PROCEDURE — 92557 COMPREHENSIVE HEARING TEST: CPT | Mod: S$GLB,,, | Performed by: AUDIOLOGIST

## 2023-06-26 PROCEDURE — 99999 PR PBB SHADOW E&M-EST. PATIENT-LVL III: ICD-10-PCS | Mod: PBBFAC,,,

## 2023-06-26 PROCEDURE — 3044F HG A1C LEVEL LT 7.0%: CPT | Mod: CPTII,S$GLB,,

## 2023-06-26 PROCEDURE — 92567 TYMPANOMETRY: CPT | Mod: S$GLB,,, | Performed by: AUDIOLOGIST

## 2023-06-26 PROCEDURE — 92004 COMPRE OPH EXAM NEW PT 1/>: CPT | Mod: S$GLB,,,

## 2023-06-26 PROCEDURE — 92015 DETERMINE REFRACTIVE STATE: CPT | Mod: S$GLB,,,

## 2023-06-26 PROCEDURE — 1159F PR MEDICATION LIST DOCUMENTED IN MEDICAL RECORD: ICD-10-PCS | Mod: CPTII,S$GLB,,

## 2023-06-26 NOTE — PROGRESS NOTES
HPI    New pt here for annual. Last exam- 1 year @ [facility]    Pt c/o sharp pain in OS x 2-3 months. Pt sts it happens ever 2 weeks or   so, feels like a knife. Pt wears glasses for reading only. Pt floaters OS,   no changes. Pt denies flashes. Pt using Systane for dryness prn.  Last edited by Ale Julio, OD on 6/26/2023 12:02 PM.            Assessment /Plan     For exam results, see Encounter Report.    Dry eye syndrome of bilateral lacrimal glands    Eye pain, left  -     Ambulatory referral/consult to Ophthalmology    Vitreous floaters of both eyes    Presbyopia of both eyes      1-2.  1+ diffuse SPK with moderate debris in tear film OU. Ed pt on nature of dry eye and chronicity of disease. Recommended pt increase use of Systane to BID-QID daily and incorporate gel drop at bedtime (cautioned on blur). Ed pt that the stabbing pain she often experiences is likely secondary to excessive dryness. Ed pt to call or message if no improvement or worsening of symptoms. Otherwise, monitor.    3. Ed pt on floaters and their etiology. Reviewed signs and symptoms of a RD with pt and ed to RTC asap if experienced.    4. Excellent uncorrected DVA, ok to continue with OTC readers if pt prefers (+2.00-+2.25 recommended). Dispensed spec rx for SVN only. Monitor yearly for changes.    RTC: 1 year for comprehensive exam or sooner prn

## 2023-06-26 NOTE — Clinical Note
Dr. Cuello,   Please see the attached audiogram and audiology encounter notes for Georgia Gomez.  Based on the results, an ENT consult is recommended due to the asymmetrical SNHL.  Also recommended would be right-sided amplification pending medical clearance, hearing protection for all loud sounds and annual audiogram to monitor hearing loss.   If you could enter the referral to ENT that would be appreciated.   Thank you,  Katerin

## 2023-06-26 NOTE — PROGRESS NOTES
Georgia Gomez was seen 06/26/2023 for an audiological evaluation.      Pt reported intermittent tinnitus in her right ear for the past six months to one year.      Otoscopy revealed clear view of both external ear canals and tympanic membranes.  No obstructive cerumen present in either ear canal.       Audiogram results revealed a mild asymmetrical sensorineural hearing loss for the right ear and a mild sensorineural hearing loss at 6KHz for the left ear.  Speech Reception Thresholds were 20 dBHL for the right ear and 10 dBHL for the left ear.  Word recognition scores were excellent for the right ear and excellent for the left ear.   Tympanograms were Type A for the right ear and Type A for the left ear.    Audiogram results were reviewed in detail with patient and all questions were answered. Results will be reviewed by ENT and/or referring provider at the completion of this note.

## 2023-08-08 RX ORDER — BUPROPION HYDROCHLORIDE 200 MG/1
TABLET, EXTENDED RELEASE ORAL
Qty: 180 TABLET | Refills: 3 | Status: SHIPPED | OUTPATIENT
Start: 2023-08-08 | End: 2023-09-27

## 2023-08-08 NOTE — TELEPHONE ENCOUNTER
Refill Decision Note   Georgia Gomez  is requesting a refill authorization.  Brief Assessment and Rationale for Refill:  Approve     Medication Therapy Plan:         Comments:     Note composed:6:25 PM 08/08/2023             Appointments     Last Visit   6/9/2023 Grace Cuello MD   Next Visit   Visit date not found Grace Cuello MD

## 2023-08-08 NOTE — TELEPHONE ENCOUNTER
No care due was identified.  [facility] Embedded Care Due Messages. Reference number: [id number].   8/08/2023 8:05:20 AM CDT

## 2023-09-15 RX ORDER — TIRZEPATIDE 2.5 MG/.5ML
INJECTION, SOLUTION SUBCUTANEOUS
Qty: 12 PEN | Refills: 0 | Status: SHIPPED | OUTPATIENT
Start: 2023-09-15 | End: 2023-12-21

## 2023-09-15 NOTE — TELEPHONE ENCOUNTER
No care due was identified.  [facility] Embedded Care Due Messages. Reference number: [id number].   9/15/2023 3:41:46 PM CDT

## 2023-09-15 NOTE — TELEPHONE ENCOUNTER
Refill Decision Note   Georgia Gomez  is requesting a refill authorization.  Brief Assessment and Rationale for Refill:  Approve     Medication Therapy Plan:         Comments:     Note composed:3:55 PM 09/15/2023

## 2023-09-27 RX ORDER — BUPROPION HYDROCHLORIDE 200 MG/1
TABLET, EXTENDED RELEASE ORAL
Qty: 180 TABLET | Refills: 2 | Status: SHIPPED | OUTPATIENT
Start: 2023-09-27

## 2023-09-27 NOTE — TELEPHONE ENCOUNTER
No care due was identified.  [facility] Embedded Care Due Messages. Reference number: [id number].   9/27/2023 9:11:33 AM CDT

## 2023-09-27 NOTE — TELEPHONE ENCOUNTER
Refill Decision Note   Georgia Gomez  is requesting a refill authorization.  Brief Assessment and Rationale for Refill:  Approve     Medication Therapy Plan:         Comments:     Note composed:2:18 PM 09/27/2023

## 2023-12-21 ENCOUNTER — OFFICE VISIT (OUTPATIENT)
Dept: URGENT CARE | Facility: CLINIC | Age: 58
End: 2023-12-21
Payer: COMMERCIAL

## 2023-12-21 VITALS
OXYGEN SATURATION: 99 % | BODY MASS INDEX: 22.34 KG/M2 | DIASTOLIC BLOOD PRESSURE: 60 MMHG | RESPIRATION RATE: 18 BRPM | HEIGHT: 66 IN | SYSTOLIC BLOOD PRESSURE: 101 MMHG | HEART RATE: 81 BPM | WEIGHT: 139 LBS | TEMPERATURE: 98 F

## 2023-12-21 DIAGNOSIS — R05.9 COUGH, UNSPECIFIED TYPE: ICD-10-CM

## 2023-12-21 DIAGNOSIS — J32.9 SINUSITIS, UNSPECIFIED CHRONICITY, UNSPECIFIED LOCATION: Primary | ICD-10-CM

## 2023-12-21 LAB
CTP QC/QA: YES
POC MOLECULAR INFLUENZA A AGN: NEGATIVE
POC MOLECULAR INFLUENZA B AGN: NEGATIVE
POC RSV RAPID ANT MOLECULAR: NEGATIVE
SARS-COV-2 AG RESP QL IA.RAPID: NEGATIVE

## 2023-12-21 PROCEDURE — 87634 POCT RESPIRATORY SYNCYTIAL VIRUS BY MOLECULAR: ICD-10-PCS | Mod: QW,S$GLB,, | Performed by: PHYSICIAN ASSISTANT

## 2023-12-21 PROCEDURE — 87502 POCT INFLUENZA A/B MOLECULAR: ICD-10-PCS | Mod: QW,S$GLB,, | Performed by: PHYSICIAN ASSISTANT

## 2023-12-21 PROCEDURE — 87502 INFLUENZA DNA AMP PROBE: CPT | Mod: QW,S$GLB,, | Performed by: PHYSICIAN ASSISTANT

## 2023-12-21 PROCEDURE — 87634 RSV DNA/RNA AMP PROBE: CPT | Mod: QW,S$GLB,, | Performed by: PHYSICIAN ASSISTANT

## 2023-12-21 PROCEDURE — 99213 PR OFFICE/OUTPT VISIT, EST, LEVL III, 20-29 MIN: ICD-10-PCS | Mod: S$GLB,,, | Performed by: PHYSICIAN ASSISTANT

## 2023-12-21 PROCEDURE — 87811 SARS CORONAVIRUS 2 ANTIGEN POCT, MANUAL READ: ICD-10-PCS | Mod: QW,S$GLB,, | Performed by: PHYSICIAN ASSISTANT

## 2023-12-21 PROCEDURE — 99213 OFFICE O/P EST LOW 20 MIN: CPT | Mod: S$GLB,,, | Performed by: PHYSICIAN ASSISTANT

## 2023-12-21 PROCEDURE — 87811 SARS-COV-2 COVID19 W/OPTIC: CPT | Mod: QW,S$GLB,, | Performed by: PHYSICIAN ASSISTANT

## 2023-12-21 RX ORDER — PREDNISONE 10 MG/1
TABLET ORAL
Qty: 11 TABLET | Refills: 0 | Status: SHIPPED | OUTPATIENT
Start: 2023-12-21 | End: 2024-01-26

## 2023-12-21 RX ORDER — AZITHROMYCIN 250 MG/1
TABLET, FILM COATED ORAL
Qty: 6 TABLET | Refills: 0 | Status: SHIPPED | OUTPATIENT
Start: 2023-12-21 | End: 2023-12-26

## 2023-12-21 RX ORDER — PROMETHAZINE HYDROCHLORIDE AND DEXTROMETHORPHAN HYDROBROMIDE 6.25; 15 MG/5ML; MG/5ML
5 SYRUP ORAL EVERY 4 HOURS PRN
Qty: 240 ML | Refills: 0 | Status: SHIPPED | OUTPATIENT
Start: 2023-12-21 | End: 2023-12-31

## 2023-12-21 NOTE — PROGRESS NOTES
"Subjective:      Patient ID: Georgia Gomez is a 58 y.o. female.    Vitals:  height is 5' 6" (1.676 m) and weight is 63 kg (139 lb). Her temperature is 98.1 °F (36.7 °C). Her blood pressure is 101/60 and her pulse is 81. Her respiration is 18 and oxygen saturation is 99%.     Chief Complaint: Nasal Congestion (13 days with this cold  Blowing green last 3 days   Occasional cough - w/green mucus - Entered by patient)    58 year old female presents today with nasal congestion, nasal drainage, blowing green, post nasal drip, edema of eyes, body aches, watery eyes, ears are itching, cough with green sputum. Symptoms started 13 days ago. Treatments at home includes Afrin with no relief. Close exposure to RSV from grand kids 2 weeks ago.     Sinus Problem  This is a new problem. The current episode started 1 to 4 weeks ago. The problem is unchanged. There has been no fever. Associated symptoms include congestion, coughing, sinus pressure and a sore throat. Pertinent negatives include no chills, diaphoresis, ear pain, headaches, hoarse voice, neck pain, shortness of breath, sneezing or swollen glands. Past treatments include oral decongestants.       Constitution: Negative for chills, sweating, fatigue and fever.   HENT:  Positive for congestion, postnasal drip, sinus pain, sinus pressure and sore throat. Negative for ear pain, drooling, trouble swallowing and voice change.    Neck: Negative for neck pain, neck stiffness, painful lymph nodes and neck swelling.   Cardiovascular:  Negative for chest pain, leg swelling, palpitations, sob on exertion and passing out.   Eyes:  Negative for eye pain, eye redness, photophobia, double vision, blurred vision and eyelid swelling.   Respiratory:  Positive for cough. Negative for chest tightness, sputum production, bloody sputum, shortness of breath, stridor and wheezing.    Gastrointestinal:  Negative for abdominal pain, abdominal bloating, nausea, vomiting, constipation, diarrhea " and heartburn.   Musculoskeletal:  Negative for joint pain, joint swelling, abnormal ROM of joint, back pain, muscle cramps and muscle ache.   Skin:  Negative for rash and hives.   Allergic/Immunologic: Negative for seasonal allergies, food allergies, hives, itching and sneezing.   Neurological:  Negative for dizziness, light-headedness, passing out, loss of balance, headaches, altered mental status, loss of consciousness and seizures.   Hematologic/Lymphatic: Negative for swollen lymph nodes.   Psychiatric/Behavioral:  Negative for altered mental status and nervous/anxious. The patient is not nervous/anxious.       Objective:     Physical Exam   Constitutional: She is oriented to person, place, and time. She appears well-developed. She is cooperative.  Non-toxic appearance. She does not appear ill. No distress.   HENT:   Head: Normocephalic and atraumatic.   Ears:   Right Ear: Hearing, tympanic membrane, external ear and ear canal normal.   Left Ear: Hearing, tympanic membrane, external ear and ear canal normal.   Nose: Mucosal edema and rhinorrhea present. No nasal deformity. No epistaxis. Right sinus exhibits no maxillary sinus tenderness and no frontal sinus tenderness. Left sinus exhibits no maxillary sinus tenderness and no frontal sinus tenderness.   Mouth/Throat: Uvula is midline and mucous membranes are normal. No trismus in the jaw. Normal dentition. No uvula swelling. Posterior oropharyngeal erythema and cobblestoning present. No oropharyngeal exudate, posterior oropharyngeal edema or tonsillar abscesses. No tonsillar exudate.   Eyes: Conjunctivae and lids are normal. No scleral icterus.   Neck: Trachea normal and phonation normal. Neck supple. No edema present. No erythema present. No neck rigidity present.   Cardiovascular: Normal rate, regular rhythm, normal heart sounds and normal pulses.   Pulmonary/Chest: Effort normal and breath sounds normal. No accessory muscle usage or stridor. No respiratory  distress. She has no decreased breath sounds. She has no wheezes. She has no rhonchi. She has no rales.   Abdominal: Normal appearance.   Musculoskeletal: Normal range of motion.         General: No deformity or edema. Normal range of motion.   Lymphadenopathy:     She has no cervical adenopathy.   Neurological: She is alert and oriented to person, place, and time. She exhibits normal muscle tone. Coordination normal.   Skin: Skin is warm, dry, intact, not diaphoretic, not pale and no rash. Capillary refill takes less than 2 seconds.   Psychiatric: Her speech is normal and behavior is normal. Judgment and thought content normal.   Nursing note and vitals reviewed.      Assessment:     1. Sinusitis, unspecified chronicity, unspecified location    2. Cough, unspecified type        Plan:       Sinusitis, unspecified chronicity, unspecified location    Cough, unspecified type  -     POCT RSV by Molecular  -     SARS Coronavirus 2 Antigen, POCT Manual Read  -     POCT Influenza A/B MOLECULAR    Other orders  -     predniSONE (DELTASONE) 10 MG tablet; Take 40mg for 1 days, take 30mg for 1 days, take 20mg for 1 days, take 10mg for 2 days  Dispense: 11 tablet; Refill: 0  -     azithromycin (Z-KAUSHIK) 250 MG tablet; Take 2 tablets by mouth on day 1; Take 1 tablet by mouth on days 2-5  Dispense: 6 tablet; Refill: 0  -     promethazine-dextromethorphan (PROMETHAZINE-DM) 6.25-15 mg/5 mL Syrp; Take 5 mLs by mouth every 4 (four) hours as needed (cough).  Dispense: 240 mL; Refill: 0      Patient Instructions   OVER THE COUNTER RECOMMENDATIONS/SUGGESTIONS.     Make sure to stay well hydrated.     Use Nasal Saline to mechanically move any post nasal drip from your eustachian tube or from the back of your throat.     Use warm salt water gargles to ease your throat pain. Warm salt water gargles as needed for sore throat-  1/2 tsp salt to 1 cup warm water, gargle as desired.     Use an antihistamine such as Claritin, Zyrtec or Allegra to  dry you out.      Use pseudoephedrine (behind the counter) to decongest. Pseudoephedrine  30 mg up to 240 mg /day. It can raise your blood pressure and give you palpitations.     Use mucinex (guaifenisin) to break up mucous up to 2400mg/day to loosen any mucous.   The mucinex DM pill has a cough suppressant that can be sedating. It can be used at night to stop the tickle at the back of your throat.  You can use Mucinex D (it has guaifenesin and a high dose of pseudoephedrine) in the mornings to help decongest.        Use Flonase 1-2 sprays/nostril per day. It is a local acting steroid nasal spray, if you develop a bloody nose, stop using the medication immediately.     Sometimes Nyquil at night is beneficial to help you get some rest, however it is sedating and it does have an antihistamine, and tylenol.     Honey is a natural cough suppressant that can be used.     Tylenol up to 4,000 mg a day is safe for short periods and can be used for body aches, pain, and fever. However in high doses and prolonged use it can cause liver irritation.     Ibuprofen is a non-steroidal anti-inflammatory that can be used for body aches, pain, and fever.However it can also cause stomach irritation if over used.     INSTRUCTIONS:  - Rest.  - Drink plenty of fluids.  - Take Tylenol and/or Ibuprofen as directed as needed for fever/pain.  Do not take more than the recommended dose.  - follow up with your PCP within the next 1-2 weeks as needed.  - You must understand that you have received an Urgent Care treatment only and that you may be released before all of your medical problems are known or treated.   - You, the patient, will arrange for follow up care as instructed.   - If your condition worsens or fails to improve we recommend that you receive another evaluation at the ER immediately or contact your PCP to discuss your concerns.   - You can call [phone] or [phone] to help schedule an appointment with the appropriate  provider.     -If you smoke cigarettes, it would be beneficial for you to stop.

## 2023-12-21 NOTE — PATIENT INSTRUCTIONS
OVER THE COUNTER RECOMMENDATIONS/SUGGESTIONS.     Make sure to stay well hydrated.     Use Nasal Saline to mechanically move any post nasal drip from your eustachian tube or from the back of your throat.     Use warm salt water gargles to ease your throat pain. Warm salt water gargles as needed for sore throat-  1/2 tsp salt to 1 cup warm water, gargle as desired.     Use an antihistamine such as Claritin, Zyrtec or Allegra to dry you out.      Use pseudoephedrine (behind the counter) to decongest. Pseudoephedrine  30 mg up to 240 mg /day. It can raise your blood pressure and give you palpitations.     Use mucinex (guaifenisin) to break up mucous up to 2400mg/day to loosen any mucous.   The mucinex DM pill has a cough suppressant that can be sedating. It can be used at night to stop the tickle at the back of your throat.  You can use Mucinex D (it has guaifenesin and a high dose of pseudoephedrine) in the mornings to help decongest.        Use Flonase 1-2 sprays/nostril per day. It is a local acting steroid nasal spray, if you develop a bloody nose, stop using the medication immediately.     Sometimes Nyquil at night is beneficial to help you get some rest, however it is sedating and it does have an antihistamine, and tylenol.     Honey is a natural cough suppressant that can be used.     Tylenol up to 4,000 mg a day is safe for short periods and can be used for body aches, pain, and fever. However in high doses and prolonged use it can cause liver irritation.     Ibuprofen is a non-steroidal anti-inflammatory that can be used for body aches, pain, and fever.However it can also cause stomach irritation if over used.     INSTRUCTIONS:  - Rest.  - Drink plenty of fluids.  - Take Tylenol and/or Ibuprofen as directed as needed for fever/pain.  Do not take more than the recommended dose.  - follow up with your PCP within the next 1-2 weeks as needed.  - You must understand that you have received an Urgent Care treatment  only and that you may be released before all of your medical problems are known or treated.   - You, the patient, will arrange for follow up care as instructed.   - If your condition worsens or fails to improve we recommend that you receive another evaluation at the ER immediately or contact your PCP to discuss your concerns.   - You can call [phone] or [phone] to help schedule an appointment with the appropriate provider.     -If you smoke cigarettes, it would be beneficial for you to stop.

## 2024-01-12 ENCOUNTER — OFFICE VISIT (OUTPATIENT)
Dept: FAMILY MEDICINE | Facility: CLINIC | Age: 59
End: 2024-01-12
Payer: COMMERCIAL

## 2024-01-12 VITALS
RESPIRATION RATE: 18 BRPM | OXYGEN SATURATION: 98 % | HEART RATE: 76 BPM | WEIGHT: 143.44 LBS | BODY MASS INDEX: 23.15 KG/M2 | SYSTOLIC BLOOD PRESSURE: 112 MMHG | DIASTOLIC BLOOD PRESSURE: 68 MMHG

## 2024-01-12 DIAGNOSIS — R51.9 CHRONIC NONINTRACTABLE HEADACHE, UNSPECIFIED HEADACHE TYPE: Primary | ICD-10-CM

## 2024-01-12 DIAGNOSIS — G89.29 CHRONIC NONINTRACTABLE HEADACHE, UNSPECIFIED HEADACHE TYPE: Primary | ICD-10-CM

## 2024-01-12 PROCEDURE — 99213 OFFICE O/P EST LOW 20 MIN: CPT | Mod: 25,S$GLB,, | Performed by: STUDENT IN AN ORGANIZED HEALTH CARE EDUCATION/TRAINING PROGRAM

## 2024-01-12 PROCEDURE — 3074F SYST BP LT 130 MM HG: CPT | Mod: CPTII,S$GLB,, | Performed by: STUDENT IN AN ORGANIZED HEALTH CARE EDUCATION/TRAINING PROGRAM

## 2024-01-12 PROCEDURE — 1160F RVW MEDS BY RX/DR IN RCRD: CPT | Mod: CPTII,S$GLB,, | Performed by: STUDENT IN AN ORGANIZED HEALTH CARE EDUCATION/TRAINING PROGRAM

## 2024-01-12 PROCEDURE — 3008F BODY MASS INDEX DOCD: CPT | Mod: CPTII,S$GLB,, | Performed by: STUDENT IN AN ORGANIZED HEALTH CARE EDUCATION/TRAINING PROGRAM

## 2024-01-12 PROCEDURE — 1159F MED LIST DOCD IN RCRD: CPT | Mod: CPTII,S$GLB,, | Performed by: STUDENT IN AN ORGANIZED HEALTH CARE EDUCATION/TRAINING PROGRAM

## 2024-01-12 PROCEDURE — 96372 THER/PROPH/DIAG INJ SC/IM: CPT | Mod: S$GLB,,, | Performed by: STUDENT IN AN ORGANIZED HEALTH CARE EDUCATION/TRAINING PROGRAM

## 2024-01-12 PROCEDURE — 99999 PR PBB SHADOW E&M-EST. PATIENT-LVL III: CPT | Mod: PBBFAC,,, | Performed by: STUDENT IN AN ORGANIZED HEALTH CARE EDUCATION/TRAINING PROGRAM

## 2024-01-12 PROCEDURE — 3078F DIAST BP <80 MM HG: CPT | Mod: CPTII,S$GLB,, | Performed by: STUDENT IN AN ORGANIZED HEALTH CARE EDUCATION/TRAINING PROGRAM

## 2024-01-12 RX ORDER — KETOROLAC TROMETHAMINE 30 MG/ML
15 INJECTION, SOLUTION INTRAMUSCULAR; INTRAVENOUS
Status: DISCONTINUED | OUTPATIENT
Start: 2024-01-12 | End: 2024-01-12

## 2024-01-12 RX ORDER — KETOROLAC TROMETHAMINE 30 MG/ML
30 INJECTION, SOLUTION INTRAMUSCULAR; INTRAVENOUS
Status: COMPLETED | OUTPATIENT
Start: 2024-01-12 | End: 2024-01-12

## 2024-01-12 RX ADMIN — KETOROLAC TROMETHAMINE 30 MG: 30 INJECTION, SOLUTION INTRAMUSCULAR; INTRAVENOUS at 03:01

## 2024-01-12 NOTE — PROGRESS NOTES
Plan:     Georgia was seen today for headache.    Diagnoses and all orders for this visit:    Chronic nonintractable headache, unspecified headache type:  Suspect headache is due to patient not using her CPAP.  Patient will restart CPAP tonight.  She will send us an update after a few days.  Advised patient to go to nearest emergency room for further evaluation and treatment if any of the following, but not limited to, symptoms develop: worsening headache, dizziness, lightheadedness, vision changes, focal neurologic symptoms, or any other concerning symptom.  Patient agreeable to plan, expressed understanding, all questions answered.  -     ketorolac injection 30 mg       Follow up if symptoms worsen or fail to improve.    Monserrat Quintana MD  01/12/2024    Subjective:      Patient ID: Georgia Gomez is a 58 y.o. female    Chief Complaint   Patient presents with    Headache     X8 days, upon waking, 6 Tylenol or Advil       HPI  58 y.o. female with a PMHx as documented below presents to clinic today for the following:    Patient reports 8 day history of persistent headache of moderate intensity, primarily localized to the temporal region bilaterally (R>L).  Onset of headache not associated with any particular event or injury.  No associated photophobia, phonophobia, dizziness, lightheadedness, vision changes, focal neurologic symptoms.  Patient states that the headache does not wake her up from sleep.  No tenderness to bilateral temples with light touch.  Positional changes do not change headache intensity.  No changes to environmental exposures.  No changes in medication regimens.  Patient reports managing symptoms with ibuprofen 200 mg t.i.d. p.r.n..    Only identifiable contributing factor is that patient has not used her CPAP in about 2 weeks (initially stopped 2/2 congestion, which has now resolved).    ROS  Constitutional:  Negative for chills and fever.   Respiratory:  Negative for shortness of breath.     Cardiovascular:  Negative for chest pain.   Gastrointestinal:  Negative for abdominal pain, constipation, diarrhea, nausea and vomiting.     Current Outpatient Medications   Medication Instructions    biotin 5,000 mcg TbDL 1 tablet, Oral, Daily    buPROPion (WELLBUTRIN SR) 200 MG SR12 TAKE 1 TABLET BY MOUTH TWICE DAILY    estradiol 0.05 mg/24 hr td ptsw (VIVELLE-DOT) 0.05 mg/24 hr 1 patch, Transdermal, Twice weekly    hydrocortisone (PROCTOSOL HC) 2.5 % rectal cream PLACE RECTALLY 2 (TWO) TIMES DAILY AS NEEDED FOR HEMORRHOIDS. PLACE RECTALLY TWICE DAILY AS DIRECTED Strength: 2.5 %    ibuprofen (ADVIL,MOTRIN) 200 mg, Oral, Every 6 hours PRN    multivitamin (THERAGRAN) per tablet 1 tablet, Oral, Daily    predniSONE (DELTASONE) 10 MG tablet Take 40mg for 1 days, take 30mg for 1 days, take 20mg for 1 days, take 10mg for 2 days    testosterone (ANDROGEL) 5 g, Topical (Top), Daily    vitamin D (VITAMIN D3) 1,000 Units, Oral, Daily      Past Medical History:   Diagnosis Date    Allergic rhinitis     Arthritis     Headache(784.0)     Rash      Past Surgical History:   Procedure Laterality Date    COLONOSCOPY N/A 07/20/2020    Procedure: COLONOSCOPY;  Surgeon: Lalo Jimenez Jr., MD;  Location: [facility];  Service: Endoscopy;  Laterality: N/A;    HYSTERECTOMY  10/2014    uterine fibroids     Review of patient's allergies indicates:  No Known Allergies    Family History   Problem Relation Age of Onset    Diabetes Mother     Hypertension Mother     Cancer Father         lung ca    Heart disease Father     Hyperlipidemia Father     Hypertension Father     Cancer Sister         Non Hodgkin's lymphoma    No Known Problems Daughter     No Known Problems Son     Glaucoma Neg Hx     Retinal detachment Neg Hx     Macular degeneration Neg Hx      Social History     Tobacco Use    Smoking status: Never    Smokeless tobacco: Never   Substance Use Topics    Alcohol use: Yes     Comment: occ    Drug use: No     Currently on File with  [facility]:   Most Recent Immunizations   Administered Date(s) Administered    COVID-19, vector-nr, rS-Ad26, PF (Franny) 07/21/2021    Influenza - Quadrivalent - PF *Preferred* (6 months and older) 10/25/2023    Tdap 06/17/2016     Objective:      Vitals:    01/12/24 1429   BP: 112/68   BP Location: Right arm   Patient Position: Sitting   Pulse: 76   Resp: 18   SpO2: 98%   Weight: 65.1 kg (143 lb 6.6 oz)     Body mass index is 23.15 kg/m².    Physical Exam   Constitutional:       General: No acute distress.  HENT:      Head: Normocephalic and atraumatic.   Pulmonary:      Effort: Pulmonary effort is normal. No respiratory distress.   Neurological:      General: No focal deficit present.      Mental Status: Alert and oriented to person, place, and time. Mental status is at baseline.    Assessment:       1. Chronic nonintractable headache, unspecified headache type        Monserrat Quintana MD  [facility]  Office: [phone]   Fax: [phone]  01/12/2024      Disclaimer: This note was partly generated using dictation software which may occasionally result in transcription errors.    Total time spent on this encounter includes face to face time and non-face to face time preparing to see the patient (eg, review of tests), obtaining and/or reviewing separately obtained history, documenting clinical information in the electronic or other health record, independently interpreting results, and communicating results to the patient/family/caregiver, or care coordinator.

## 2024-01-26 ENCOUNTER — OFFICE VISIT (OUTPATIENT)
Dept: FAMILY MEDICINE | Facility: CLINIC | Age: 59
End: 2024-01-26
Payer: COMMERCIAL

## 2024-01-26 VITALS — HEIGHT: 66 IN | BODY MASS INDEX: 22.98 KG/M2 | WEIGHT: 143 LBS

## 2024-01-26 DIAGNOSIS — G89.29 CHRONIC NONINTRACTABLE HEADACHE, UNSPECIFIED HEADACHE TYPE: Primary | ICD-10-CM

## 2024-01-26 DIAGNOSIS — E66.3 OVERWEIGHT: ICD-10-CM

## 2024-01-26 DIAGNOSIS — R51.9 CHRONIC NONINTRACTABLE HEADACHE, UNSPECIFIED HEADACHE TYPE: Primary | ICD-10-CM

## 2024-01-26 PROCEDURE — 1160F RVW MEDS BY RX/DR IN RCRD: CPT | Mod: CPTII,95,, | Performed by: FAMILY MEDICINE

## 2024-01-26 PROCEDURE — 99214 OFFICE O/P EST MOD 30 MIN: CPT | Mod: 95,,, | Performed by: FAMILY MEDICINE

## 2024-01-26 PROCEDURE — 3008F BODY MASS INDEX DOCD: CPT | Mod: CPTII,95,, | Performed by: FAMILY MEDICINE

## 2024-01-26 PROCEDURE — 1159F MED LIST DOCD IN RCRD: CPT | Mod: CPTII,95,, | Performed by: FAMILY MEDICINE

## 2024-01-26 RX ORDER — CYCLOBENZAPRINE HCL 5 MG
5 TABLET ORAL 3 TIMES DAILY PRN
Qty: 30 TABLET | Refills: 0 | Status: SHIPPED | OUTPATIENT
Start: 2024-01-26 | End: 2024-02-05

## 2024-01-26 RX ORDER — BUTALBITAL, ACETAMINOPHEN AND CAFFEINE 50; 325; 40 MG/1; MG/1; MG/1
1 TABLET ORAL EVERY 6 HOURS PRN
Qty: 30 TABLET | Refills: 0 | Status: SHIPPED | OUTPATIENT
Start: 2024-01-26 | End: 2024-02-25

## 2024-01-26 RX ORDER — RIZATRIPTAN BENZOATE 5 MG/1
5 TABLET, ORALLY DISINTEGRATING ORAL
Qty: 10 TABLET | Refills: 3 | Status: SHIPPED | OUTPATIENT
Start: 2024-01-26 | End: 2024-05-06

## 2024-01-26 RX ORDER — NAPROXEN SODIUM 550 MG/1
550 TABLET ORAL 2 TIMES DAILY WITH MEALS
Qty: 20 TABLET | Refills: 0 | Status: SHIPPED | OUTPATIENT
Start: 2024-01-26 | End: 2024-05-06

## 2024-01-26 RX ORDER — TIRZEPATIDE 2.5 MG/.5ML
2.5 INJECTION, SOLUTION SUBCUTANEOUS
Qty: 4 PEN | Refills: 6 | Status: SHIPPED | OUTPATIENT
Start: 2024-01-26 | End: 2024-05-06

## 2024-01-26 NOTE — PROGRESS NOTES
Subjective:       Patient ID: Georgia Gomez is a 58 y.o. female.    Chief Complaint: Headache (3 weeks not getting better)      The patient location is: [place]  The chief complaint leading to consultation is: med review  Visit type: Virtual visit with synchronous audio and video  Total time spent with patient: 10mins  Each patient to whom he or she provides medical services by telemedicine is:  (1) informed of the relationship between the physician and patient and the respective role of any other health care provider with respect to management of the patient; and (2) notified that he or she may decline to receive medical services by telemedicine and may withdraw from such care at any time.    Notes:   She's been struggling with a HA x 3 weeks. Saw Dr Quintana 2 weeks ago and had 24hrs of relief with a toradol injection. She recalls a sinus infection a few weeks prior. Used afrin for several weeks, but the HA did not improve. Restarting cpap after sinuses, no relief.   The HA does wax and wane in severity, usually 5-6, but can increase to an 8.     Review of Systems   Constitutional:  Negative for activity change and unexpected weight change.   HENT:  Positive for rhinorrhea. Negative for hearing loss and trouble swallowing.    Eyes:  Positive for visual disturbance. Negative for discharge.   Respiratory:  Negative for chest tightness and wheezing.    Cardiovascular:  Negative for chest pain and palpitations.   Gastrointestinal:  Positive for constipation. Negative for blood in stool, diarrhea and vomiting.   Endocrine: Negative for polydipsia and polyuria.   Genitourinary:  Negative for difficulty urinating, dysuria, hematuria and menstrual problem.   Musculoskeletal:  Negative for arthralgias, joint swelling and neck pain.   Neurological:  Positive for headaches. Negative for weakness.   Psychiatric/Behavioral:  Negative for confusion and dysphoric mood.        Objective:        Physical Exam  Vitals and nursing  note reviewed.   Constitutional:       General: She is not in acute distress.     Appearance: Normal appearance. She is well-developed.   HENT:      Head: Normocephalic and atraumatic.   Eyes:      General: No scleral icterus.        Right eye: No discharge.         Left eye: No discharge.      Conjunctiva/sclera: Conjunctivae normal.   Pulmonary:      Effort: Pulmonary effort is normal. No respiratory distress.   Skin:     General: Skin is warm and dry.   Neurological:      General: No focal deficit present.      Mental Status: She is alert and oriented to person, place, and time.   Psychiatric:         Mood and Affect: Mood normal.         Behavior: Behavior normal.             Assessment:   Chronic nonintractable headache, unspecified headache type  -     CT Head Without Contrast; Future; Expected date: 01/26/2024    Overweight  -     tirzepatide (MOUNJARO) 2.5 mg/0.5 mL PnIj; Inject 2.5 mg into the skin every 7 days.  Dispense: 4 Pen; Refill: 6    Other orders  -     butalbital-acetaminophen-caffeine -40 mg (FIORICET, ESGIC) -40 mg per tablet; Take 1 tablet by mouth every 6 (six) hours as needed for Headaches.  Dispense: 30 tablet; Refill: 0  -     rizatriptan (MAXALT-MLT) 5 MG disintegrating tablet; Take 1 tablet (5 mg total) by mouth as needed for Migraine (no more than 2 doses in 24hrs). May repeat in 2 hours if needed  Dispense: 10 tablet; Refill: 3  -     naproxen sodium (ANAPROX) 550 MG tablet; Take 1 tablet (550 mg total) by mouth 2 (two) times daily with meals.  Dispense: 20 tablet; Refill: 0  -     cyclobenzaprine (FLEXERIL) 5 MG tablet; Take 1 tablet (5 mg total) by mouth 3 (three) times daily as needed for Muscle spasms.  Dispense: 30 tablet; Refill: 0         Patient aware of limitations to assessment and exam of telemedicine. Deemed appropriate at this time given current covid-19 concerns.

## 2024-01-26 NOTE — PATIENT INSTRUCTIONS
Naproxen 2x/day for the next 3-4 days (at least) to ensure the headache completely clears.    Fioricet can be taken every 6hrs as needed for headaches that do not respond to the naproxen.      Maxalt is used to shut down migraines. Take 1 tonight at bedtime and a 2nd in the morning if a headache persists. They can be used for persistent or severe headaches/migraines that have not responded to the naproxen And fioricet. No more than 2 doses of Maxalt in 24hrs.     Flexeril is a muscle relaxer and can be taken as needed up to 3x/day for neck tension.

## 2024-02-02 ENCOUNTER — PATIENT MESSAGE (OUTPATIENT)
Dept: FAMILY MEDICINE | Facility: CLINIC | Age: 59
End: 2024-02-02
Payer: COMMERCIAL

## 2024-02-06 ENCOUNTER — OFFICE VISIT (OUTPATIENT)
Dept: FAMILY MEDICINE | Facility: CLINIC | Age: 59
End: 2024-02-06
Payer: COMMERCIAL

## 2024-02-06 VITALS — HEIGHT: 66 IN | BODY MASS INDEX: 22.98 KG/M2 | WEIGHT: 143 LBS

## 2024-02-06 DIAGNOSIS — G89.29 CHRONIC NONINTRACTABLE HEADACHE, UNSPECIFIED HEADACHE TYPE: ICD-10-CM

## 2024-02-06 DIAGNOSIS — R51.9 CHRONIC NONINTRACTABLE HEADACHE, UNSPECIFIED HEADACHE TYPE: ICD-10-CM

## 2024-02-06 DIAGNOSIS — J01.90 ACUTE BACTERIAL SINUSITIS: Primary | ICD-10-CM

## 2024-02-06 DIAGNOSIS — B96.89 ACUTE BACTERIAL SINUSITIS: Primary | ICD-10-CM

## 2024-02-06 LAB
CTP QC/QA: YES
SARS-COV-2 RDRP RESP QL NAA+PROBE: NEGATIVE

## 2024-02-06 PROCEDURE — 3008F BODY MASS INDEX DOCD: CPT | Mod: CPTII,95,, | Performed by: FAMILY MEDICINE

## 2024-02-06 PROCEDURE — 1160F RVW MEDS BY RX/DR IN RCRD: CPT | Mod: CPTII,95,, | Performed by: FAMILY MEDICINE

## 2024-02-06 PROCEDURE — 99213 OFFICE O/P EST LOW 20 MIN: CPT | Mod: 95,,, | Performed by: FAMILY MEDICINE

## 2024-02-06 PROCEDURE — 1159F MED LIST DOCD IN RCRD: CPT | Mod: CPTII,95,, | Performed by: FAMILY MEDICINE

## 2024-02-06 PROCEDURE — 87635 SARS-COV-2 COVID-19 AMP PRB: CPT | Mod: QW,NDTC,, | Performed by: FAMILY MEDICINE

## 2024-02-06 RX ORDER — DOXYCYCLINE 100 MG/1
100 CAPSULE ORAL 2 TIMES DAILY
Qty: 14 CAPSULE | Refills: 0 | Status: SHIPPED | OUTPATIENT
Start: 2024-02-06 | End: 2024-03-23 | Stop reason: CLARIF

## 2024-02-06 NOTE — PROGRESS NOTES
Subjective:       Patient ID: Georgia Gomez is a 58 y.o. female.    Chief Complaint: Sinus Problem      The patient location is: [place]  The chief complaint leading to consultation is: sinus sx  Visit type: Virtual visit with synchronous audio and video  Total time spent with patient: 10mins  Each patient to whom he or she provides medical services by telemedicine is:  (1) informed of the relationship between the physician and patient and the respective role of any other health care provider with respect to management of the patient; and (2) notified that he or she may decline to receive medical services by telemedicine and may withdraw from such care at any time.    Notes: Patient seen for sx review. Reports recurrence of sx consistent with sinus inf similar to sx in Dec.   HA from previous visit resolved.     Review of Systems   Constitutional:  Negative for activity change and unexpected weight change.   HENT:  Positive for congestion, postnasal drip, rhinorrhea and sinus pressure. Negative for hearing loss and trouble swallowing.    Eyes:  Negative for discharge and visual disturbance.   Respiratory:  Positive for cough. Negative for chest tightness and wheezing.    Cardiovascular:  Negative for chest pain and palpitations.   Gastrointestinal:  Positive for constipation. Negative for blood in stool, diarrhea and vomiting.   Endocrine: Negative for polydipsia and polyuria.   Genitourinary:  Negative for difficulty urinating, dysuria, hematuria and menstrual problem.   Musculoskeletal:  Negative for arthralgias, joint swelling and neck pain.   Neurological:  Negative for weakness and headaches (resolved with meds from lov).   Psychiatric/Behavioral:  Negative for confusion and dysphoric mood.        Objective:        Physical Exam  Vitals and nursing note reviewed.   Constitutional:       General: She is not in acute distress.     Appearance: Normal appearance. She is well-developed.   HENT:      Head: Normocephalic  and atraumatic.   Eyes:      General: No scleral icterus.        Right eye: No discharge.         Left eye: No discharge.      Conjunctiva/sclera: Conjunctivae normal.   Pulmonary:      Effort: Pulmonary effort is normal. No respiratory distress.   Skin:     General: Skin is warm and dry.   Neurological:      General: No focal deficit present.      Mental Status: She is alert and oriented to person, place, and time.   Psychiatric:         Mood and Affect: Mood normal.         Behavior: Behavior normal.           Assessment:   Acute bacterial sinusitis  -     doxycycline (MONODOX) 100 MG capsule; Take 1 capsule (100 mg total) by mouth 2 (two) times daily.  Dispense: 14 capsule; Refill: 0  -     POCT COVID-19 Rapid Screening         Patient aware of limitations to assessment and exam of telemedicine. Deemed appropriate at this time given current covid-19 concerns.

## 2024-02-08 ENCOUNTER — TELEPHONE (OUTPATIENT)
Dept: FAMILY MEDICINE | Facility: CLINIC | Age: 59
End: 2024-02-08
Payer: COMMERCIAL

## 2024-02-08 NOTE — TELEPHONE ENCOUNTER
Spoke to pt, she states since starting the Doxycycline, she is dizzy and nauseous. She is not sure if it is related to the medicine or not but she has never delt with this. She said this did not start until she started the medication. She said she skipped the med today, but she is still dizzy, but not nauseous.She asked what you recommend? I did ask if she had any tinnitus and she said no, but her head feels cloudy. She said when she was walking yesterday she felt like she needed to hold onto something in order to walk.

## 2024-02-08 NOTE — TELEPHONE ENCOUNTER
----- Message from Annalise Stevenson sent at 2/8/2024  8:51 AM CST -----  Regarding: Needs return call asap  Type: Needs Medical Advice  Who Called:  Georgia  Symptoms (please be specific):  pt was scribed the doxycycline (MONODOX) 100 MG capsule And its giving her the following symptoms       feels dizzy can't walk straight, nauseous   How long has patient had these symptoms:  since taking medication and everytime she does take it  Pharmacy name and phone #:    [id number] [address]  [address] [address]  Phone: [phone] Fax: [phone]      Best Call Back Number: [phone]    Additional Information:

## 2024-02-21 RX ORDER — CEFDINIR 300 MG/1
300 CAPSULE ORAL 2 TIMES DAILY
Qty: 20 CAPSULE | Refills: 0 | Status: SHIPPED | OUTPATIENT
Start: 2024-02-21 | End: 2024-03-02

## 2024-05-06 ENCOUNTER — LAB VISIT (OUTPATIENT)
Dept: LAB | Facility: HOSPITAL | Age: 59
End: 2024-05-06
Attending: FAMILY MEDICINE
Payer: COMMERCIAL

## 2024-05-06 ENCOUNTER — OFFICE VISIT (OUTPATIENT)
Dept: FAMILY MEDICINE | Facility: CLINIC | Age: 59
End: 2024-05-06
Payer: COMMERCIAL

## 2024-05-06 ENCOUNTER — PATIENT MESSAGE (OUTPATIENT)
Dept: FAMILY MEDICINE | Facility: CLINIC | Age: 59
End: 2024-05-06

## 2024-05-06 VITALS
HEIGHT: 66 IN | BODY MASS INDEX: 22.18 KG/M2 | DIASTOLIC BLOOD PRESSURE: 60 MMHG | HEART RATE: 69 BPM | SYSTOLIC BLOOD PRESSURE: 100 MMHG | OXYGEN SATURATION: 98 % | WEIGHT: 138 LBS

## 2024-05-06 DIAGNOSIS — Z00.00 ROUTINE GENERAL MEDICAL EXAMINATION AT A HEALTH CARE FACILITY: ICD-10-CM

## 2024-05-06 DIAGNOSIS — Z12.31 SCREENING MAMMOGRAM FOR HIGH-RISK PATIENT: ICD-10-CM

## 2024-05-06 DIAGNOSIS — Z00.00 ROUTINE GENERAL MEDICAL EXAMINATION AT A HEALTH CARE FACILITY: Primary | ICD-10-CM

## 2024-05-06 LAB
ALBUMIN SERPL BCP-MCNC: 4 G/DL (ref 3.5–5.2)
ALP SERPL-CCNC: 59 U/L (ref 55–135)
ALT SERPL W/O P-5'-P-CCNC: 16 U/L (ref 10–44)
ANION GAP SERPL CALC-SCNC: 9 MMOL/L (ref 8–16)
AST SERPL-CCNC: 22 U/L (ref 10–40)
BILIRUB SERPL-MCNC: 0.5 MG/DL (ref 0.1–1)
BUN SERPL-MCNC: 25 MG/DL (ref 6–20)
CALCIUM SERPL-MCNC: 9.8 MG/DL (ref 8.7–10.5)
CHLORIDE SERPL-SCNC: 106 MMOL/L (ref 95–110)
CHOLEST SERPL-MCNC: 168 MG/DL (ref 120–199)
CHOLEST/HDLC SERPL: 2.6 {RATIO} (ref 2–5)
CO2 SERPL-SCNC: 25 MMOL/L (ref 23–29)
CREAT SERPL-MCNC: 0.8 MG/DL (ref 0.5–1.4)
ERYTHROCYTE [DISTWIDTH] IN BLOOD BY AUTOMATED COUNT: 12 % (ref 11.5–14.5)
EST. GFR  (NO RACE VARIABLE): >60 ML/MIN/1.73 M^2
ESTIMATED AVG GLUCOSE: 97 MG/DL (ref 68–131)
GLUCOSE SERPL-MCNC: 78 MG/DL (ref 70–110)
HBA1C MFR BLD: 5 % (ref 4–5.6)
HCT VFR BLD AUTO: 40.7 % (ref 37–48.5)
HDLC SERPL-MCNC: 64 MG/DL (ref 40–75)
HDLC SERPL: 38.1 % (ref 20–50)
HGB BLD-MCNC: 13.4 G/DL (ref 12–16)
LDLC SERPL CALC-MCNC: 94.6 MG/DL (ref 63–159)
MCH RBC QN AUTO: 29.3 PG (ref 27–31)
MCHC RBC AUTO-ENTMCNC: 32.9 G/DL (ref 32–36)
MCV RBC AUTO: 89 FL (ref 82–98)
NONHDLC SERPL-MCNC: 104 MG/DL
PLATELET # BLD AUTO: 220 K/UL (ref 150–450)
PMV BLD AUTO: 9.5 FL (ref 9.2–12.9)
POTASSIUM SERPL-SCNC: 4.2 MMOL/L (ref 3.5–5.1)
PROT SERPL-MCNC: 6.9 G/DL (ref 6–8.4)
RBC # BLD AUTO: 4.57 M/UL (ref 4–5.4)
SODIUM SERPL-SCNC: 140 MMOL/L (ref 136–145)
TRIGL SERPL-MCNC: 47 MG/DL (ref 30–150)
TSH SERPL DL<=0.005 MIU/L-ACNC: 1.08 UIU/ML (ref 0.4–4)
WBC # BLD AUTO: 5.04 K/UL (ref 3.9–12.7)

## 2024-05-06 PROCEDURE — 80061 LIPID PANEL: CPT | Performed by: FAMILY MEDICINE

## 2024-05-06 PROCEDURE — 3078F DIAST BP <80 MM HG: CPT | Mod: CPTII,S$GLB,, | Performed by: FAMILY MEDICINE

## 2024-05-06 PROCEDURE — 3008F BODY MASS INDEX DOCD: CPT | Mod: CPTII,S$GLB,, | Performed by: FAMILY MEDICINE

## 2024-05-06 PROCEDURE — 36415 COLL VENOUS BLD VENIPUNCTURE: CPT | Mod: PN | Performed by: FAMILY MEDICINE

## 2024-05-06 PROCEDURE — 80053 COMPREHEN METABOLIC PANEL: CPT | Performed by: FAMILY MEDICINE

## 2024-05-06 PROCEDURE — 84443 ASSAY THYROID STIM HORMONE: CPT | Performed by: FAMILY MEDICINE

## 2024-05-06 PROCEDURE — 83036 HEMOGLOBIN GLYCOSYLATED A1C: CPT | Performed by: FAMILY MEDICINE

## 2024-05-06 PROCEDURE — 3074F SYST BP LT 130 MM HG: CPT | Mod: CPTII,S$GLB,, | Performed by: FAMILY MEDICINE

## 2024-05-06 PROCEDURE — 1160F RVW MEDS BY RX/DR IN RCRD: CPT | Mod: CPTII,S$GLB,, | Performed by: FAMILY MEDICINE

## 2024-05-06 PROCEDURE — 99396 PREV VISIT EST AGE 40-64: CPT | Mod: S$GLB,,, | Performed by: FAMILY MEDICINE

## 2024-05-06 PROCEDURE — 85027 COMPLETE CBC AUTOMATED: CPT | Performed by: FAMILY MEDICINE

## 2024-05-06 PROCEDURE — 99999 PR PBB SHADOW E&M-EST. PATIENT-LVL IV: CPT | Mod: PBBFAC,,, | Performed by: FAMILY MEDICINE

## 2024-05-06 PROCEDURE — 1159F MED LIST DOCD IN RCRD: CPT | Mod: CPTII,S$GLB,, | Performed by: FAMILY MEDICINE

## 2024-05-06 RX ORDER — BUPROPION HYDROCHLORIDE 200 MG/1
TABLET, EXTENDED RELEASE ORAL
Qty: 180 TABLET | Refills: 3 | Status: SHIPPED | OUTPATIENT
Start: 2024-05-06

## 2024-05-06 NOTE — PROGRESS NOTES
Subjective:       Patient ID: Georgia Gomez is a 58 y.o. female.    Chief Complaint: Annual Exam      Georgia Gomez is in the office for annual exam.    HPI  Medical hx reviewed.   Past Medical History:   Diagnosis Date    Allergic rhinitis     Arthritis     Headache(784.0)     Rash      Maintaining previous weight loss, 32#.   She has been walking regularly for exercise.   Previous HA have resolved. She does still have maxalt to take prn.   ER note reviewed from 3/2024 re LLQ abd pain. Note reviewed - CT reviewed and neg. Taking colace more regularly.     Current Outpatient Medications:     biotin 5,000 mcg TbDL, Take 1 tablet by mouth once daily. , Disp: , Rfl:     docusate sodium (COLACE) 100 MG capsule, Take 1 capsule (100 mg total) by mouth 2 (two) times daily as needed for Constipation., Disp: 60 capsule, Rfl: 0    estradiol 0.05 mg/24 hr td ptsw (VIVELLE-DOT) 0.05 mg/24 hr, Place 1 patch onto the skin twice a week. , Disp: , Rfl:     hydrocortisone (PROCTOSOL HC) 2.5 % rectal cream, PLACE RECTALLY 2 (TWO) TIMES DAILY AS NEEDED FOR HEMORRHOIDS. PLACE RECTALLY TWICE DAILY AS DIRECTED Strength: 2.5 %, Disp: 28.35 g, Rfl: 1    multivitamin (THERAGRAN) per tablet, Take 1 tablet by mouth once daily., Disp: , Rfl:     rizatriptan (MAXALT-MLT) 5 MG disintegrating tablet, Take 1 tablet (5 mg total) by mouth as needed for Migraine (no more than 2 doses in 24hrs). May repeat in 2 hours if needed, Disp: 10 tablet, Rfl: 3    testosterone (ANDROGEL) 1 % (50 mg/5 gram) GlPk, Apply 5 g topically once daily., Disp: , Rfl:     vitamin D 1000 units Tab, Take 1,000 Units by mouth once daily., Disp: , Rfl:     buPROPion (WELLBUTRIN SR) 200 MG SR12, TAKE 1 TABLET BY MOUTH TWICE DAILY, Disp: 180 tablet, Rfl: 3    The 10-year ASCVD risk score (Heath SANCHEZ, et al., 2019) is: 1.4%    Values used to calculate the score:      Age: 58 years      Sex: Female      Is Non- : No      Diabetic: No      Tobacco  smoker: No      Systolic Blood Pressure: 100 mmHg      Is BP treated: No      HDL Cholesterol: 53 mg/dL      Total Cholesterol: 159 mg/dL     Lab Results   Component Value Date    HGBA1C 5.0 06/09/2023    HGBA1C 5.1 06/04/2020    HGBA1C 5.2 09/06/2018     Lab Results   Component Value Date    LDLCALC 94.0 06/09/2023    CREATININE 0.8 06/09/2023   Labs 2023 rev.     Review of Systems   Constitutional:  Negative for activity change and unexpected weight change.   HENT:  Negative for hearing loss, rhinorrhea and trouble swallowing.    Eyes:  Positive for visual disturbance (uses rx readers). Negative for discharge.   Respiratory:  Negative for chest tightness and wheezing.    Cardiovascular:  Negative for chest pain and palpitations.   Gastrointestinal:  Negative for blood in stool, constipation, diarrhea and vomiting.   Endocrine: Negative for polydipsia and polyuria.   Genitourinary:  Negative for difficulty urinating, dysuria, hematuria and menstrual problem.   Musculoskeletal:  Negative for arthralgias, joint swelling and neck pain.   Neurological:  Positive for headaches (previous episode resolved with rx/maxalt). Negative for weakness.   Psychiatric/Behavioral:  Negative for confusion and dysphoric mood.            Objective:      Physical Exam  Vitals and nursing note reviewed.   Constitutional:       General: She is not in acute distress.     Appearance: Normal appearance. She is well-developed.   HENT:      Head: Normocephalic and atraumatic.      Right Ear: Tympanic membrane and external ear normal.      Left Ear: Tympanic membrane and external ear normal.      Nose: Nose normal.      Mouth/Throat:      Pharynx: No oropharyngeal exudate.   Eyes:      Conjunctiva/sclera: Conjunctivae normal.      Pupils: Pupils are equal, round, and reactive to light.   Neck:      Thyroid: No thyromegaly.   Cardiovascular:      Rate and Rhythm: Normal rate and regular rhythm.   Pulmonary:      Effort: Pulmonary effort is  normal. No respiratory distress.      Breath sounds: Normal breath sounds. No wheezing.   Abdominal:      General: Bowel sounds are normal. There is no distension.      Palpations: Abdomen is soft. There is no mass.      Tenderness: There is no abdominal tenderness. There is no guarding or rebound.   Musculoskeletal:      Cervical back: Neck supple.      Right lower leg: No edema.      Left lower leg: No edema.   Lymphadenopathy:      Cervical: No cervical adenopathy.   Skin:     General: Skin is warm and dry.   Neurological:      General: No focal deficit present.      Mental Status: She is alert and oriented to person, place, and time.      Cranial Nerves: No cranial nerve deficit.   Psychiatric:         Mood and Affect: Mood normal.         Behavior: Behavior normal.             Screening recommendations appropriate to age and health status were reviewed.    Assessment & Plan:    Routine general medical examination at a health care facility  Comments:  anticipatory guidance reviewed  Orders:  -     CBC Without Differential; Future; Expected date: 05/06/2024  -     Comprehensive Metabolic Panel; Future; Expected date: 05/06/2024  -     Lipid Panel; Future; Expected date: 05/06/2024  -     Hemoglobin A1C; Future; Expected date: 05/06/2024  -     TSH; Future; Expected date: 05/06/2024    Screening mammogram for high-risk patient  -     Mammo Digital Screening Bilat w/ Rodolfo; Future; Expected date: 05/06/2024    Other orders  -     buPROPion (WELLBUTRIN SR) 200 MG SR12; TAKE 1 TABLET BY MOUTH TWICE DAILY  Dispense: 180 tablet; Refill: 3

## 2024-05-15 ENCOUNTER — PATIENT MESSAGE (OUTPATIENT)
Dept: FAMILY MEDICINE | Facility: CLINIC | Age: 59
End: 2024-05-15
Payer: COMMERCIAL

## 2024-05-15 DIAGNOSIS — E04.9 THYROID ENLARGED: Primary | ICD-10-CM

## 2024-05-27 ENCOUNTER — HOSPITAL ENCOUNTER (OUTPATIENT)
Dept: RADIOLOGY | Facility: HOSPITAL | Age: 59
Discharge: HOME | End: 2024-05-27
Attending: FAMILY MEDICINE
Payer: COMMERCIAL

## 2024-05-27 DIAGNOSIS — E04.9 THYROID ENLARGED: ICD-10-CM

## 2024-05-27 PROCEDURE — 76536 US EXAM OF HEAD AND NECK: CPT | Mod: TC,PO

## 2024-05-27 PROCEDURE — 76536 US EXAM OF HEAD AND NECK: CPT | Mod: 26,,, | Performed by: STUDENT IN AN ORGANIZED HEALTH CARE EDUCATION/TRAINING PROGRAM

## 2024-05-28 ENCOUNTER — OFFICE VISIT (OUTPATIENT)
Dept: OPTOMETRY | Facility: CLINIC | Age: 59
End: 2024-05-28
Payer: COMMERCIAL

## 2024-05-28 DIAGNOSIS — H52.4 HYPEROPIA WITH ASTIGMATISM AND PRESBYOPIA, BILATERAL: ICD-10-CM

## 2024-05-28 DIAGNOSIS — H04.123 DRY EYE SYNDROME OF BILATERAL LACRIMAL GLANDS: Primary | ICD-10-CM

## 2024-05-28 DIAGNOSIS — H52.03 HYPEROPIA WITH ASTIGMATISM AND PRESBYOPIA, BILATERAL: ICD-10-CM

## 2024-05-28 DIAGNOSIS — H02.883 MEIBOMIAN GLAND DYSFUNCTION (MGD) OF BOTH EYES: ICD-10-CM

## 2024-05-28 DIAGNOSIS — H52.203 HYPEROPIA WITH ASTIGMATISM AND PRESBYOPIA, BILATERAL: ICD-10-CM

## 2024-05-28 DIAGNOSIS — H02.886 MEIBOMIAN GLAND DYSFUNCTION (MGD) OF BOTH EYES: ICD-10-CM

## 2024-05-28 PROCEDURE — 3044F HG A1C LEVEL LT 7.0%: CPT | Mod: CPTII,S$GLB,,

## 2024-05-28 PROCEDURE — 92014 COMPRE OPH EXAM EST PT 1/>: CPT | Mod: S$GLB,,,

## 2024-05-28 PROCEDURE — 1159F MED LIST DOCD IN RCRD: CPT | Mod: CPTII,S$GLB,,

## 2024-05-28 PROCEDURE — 99999 PR PBB SHADOW E&M-EST. PATIENT-LVL III: CPT | Mod: PBBFAC,,,

## 2024-05-28 NOTE — PROGRESS NOTES
HPI    Pt here for annual eye exam. DLE - 06/26/23    Pt. States driving at night has been effected due to glare. Has been   having to strain/squint eyes. Wears otc readers (+2.00). Has eye fatigue   from being on the computer. Distance VA is okay, NV is not as sharp/in   focus as last visit. Pt using systane QD. Pt having floaters, no FOL.  Last edited by Ale Julio, OD on 5/28/2024 10:52 AM.            Assessment /Plan     For exam results, see Encounter Report.    Dry eye syndrome of bilateral lacrimal glands    Meibomian gland dysfunction (MGD) of both eyes    Hyperopia with astigmatism and presbyopia, bilateral      1-2. Pt states symptoms are much improved from last year, getting relief with Systane QD-BID. Mild debris in the tear film, no SPK OD, OS. Advise pt to continue Systane QD-BID daily for relief. Pt notices that she often gets bumps along the lash line. Mild meibomian gland dysfunction present on the lower lids OU. Ed pt on findings and on nature/etiology of MGD. Advised pt use frequent warm compresses with gentle digital massage. Continue to monitor yearly for changes, sooner prn.    3. Excellent uncorrected DVA, small spec rx for distance. Discussed spectacle options with pt, pt ok with continuing with OTC readers for now. Advised +2.25-+2.50. monitor yearly for changes.    *Optos done.*    RTC: 1 year for comprehensive exam or sooner prn

## 2024-06-11 ENCOUNTER — OFFICE VISIT (OUTPATIENT)
Dept: FAMILY MEDICINE | Facility: CLINIC | Age: 59
End: 2024-06-11
Payer: COMMERCIAL

## 2024-06-11 ENCOUNTER — TELEPHONE (OUTPATIENT)
Dept: FAMILY MEDICINE | Facility: CLINIC | Age: 59
End: 2024-06-11
Payer: COMMERCIAL

## 2024-06-11 VITALS
TEMPERATURE: 99 F | BODY MASS INDEX: 22.25 KG/M2 | WEIGHT: 138.44 LBS | HEIGHT: 66 IN | SYSTOLIC BLOOD PRESSURE: 102 MMHG | DIASTOLIC BLOOD PRESSURE: 62 MMHG

## 2024-06-11 DIAGNOSIS — J02.9 ACUTE PHARYNGITIS, UNSPECIFIED ETIOLOGY: Primary | ICD-10-CM

## 2024-06-11 DIAGNOSIS — R05.1 ACUTE COUGH: ICD-10-CM

## 2024-06-11 DIAGNOSIS — J02.9 SORE THROAT: ICD-10-CM

## 2024-06-11 LAB
CTP QC/QA: YES
CTP QC/QA: YES
MOLECULAR STREP A: NEGATIVE
SARS-COV-2 RDRP RESP QL NAA+PROBE: NEGATIVE

## 2024-06-11 PROCEDURE — 3074F SYST BP LT 130 MM HG: CPT | Mod: CPTII,S$GLB,, | Performed by: NURSE PRACTITIONER

## 2024-06-11 PROCEDURE — 1160F RVW MEDS BY RX/DR IN RCRD: CPT | Mod: CPTII,S$GLB,, | Performed by: NURSE PRACTITIONER

## 2024-06-11 PROCEDURE — 3044F HG A1C LEVEL LT 7.0%: CPT | Mod: CPTII,S$GLB,, | Performed by: NURSE PRACTITIONER

## 2024-06-11 PROCEDURE — 3008F BODY MASS INDEX DOCD: CPT | Mod: CPTII,S$GLB,, | Performed by: NURSE PRACTITIONER

## 2024-06-11 PROCEDURE — 99214 OFFICE O/P EST MOD 30 MIN: CPT | Mod: S$GLB,,, | Performed by: NURSE PRACTITIONER

## 2024-06-11 PROCEDURE — 99999 PR PBB SHADOW E&M-EST. PATIENT-LVL IV: CPT | Mod: PBBFAC,,, | Performed by: NURSE PRACTITIONER

## 2024-06-11 PROCEDURE — 87651 STREP A DNA AMP PROBE: CPT | Mod: QW,S$GLB,, | Performed by: NURSE PRACTITIONER

## 2024-06-11 PROCEDURE — 1159F MED LIST DOCD IN RCRD: CPT | Mod: CPTII,S$GLB,, | Performed by: NURSE PRACTITIONER

## 2024-06-11 PROCEDURE — 87635 SARS-COV-2 COVID-19 AMP PRB: CPT | Mod: QW,S$GLB,, | Performed by: NURSE PRACTITIONER

## 2024-06-11 PROCEDURE — 3078F DIAST BP <80 MM HG: CPT | Mod: CPTII,S$GLB,, | Performed by: NURSE PRACTITIONER

## 2024-06-11 NOTE — TELEPHONE ENCOUNTER
----- Message from Juan M Hamilton Patient Care Assistant sent at 6/11/2024  7:56 AM CDT -----  Contact: Pt  Type: Same Day Appointment    Caller is requesting a same day appointment.  Caller declined first available appt listed below.    Name of caller:Pt  When is the first available appt:No avail appts  Symptoms:Fever, Chills, Sore Throat  Best Call back number:[phone]  Additional Info:Please advise-Thank you~

## 2024-06-11 NOTE — PROGRESS NOTES
THIS DOCUMENT WAS MADE IN PART WITH VOICE RECOGNITION SOFTWARE.  OCCASIONALLY THIS SOFTWARE WILL MISINTERPRET WORDS OR PHRASES.     Assessment and Plan:    Acute pharyngitis, unspecified etiology    Sore throat  -     POCT COVID-19 Rapid Screening  -     POCT Strep A, Molecular    Acute cough  -     POCT COVID-19 Rapid Screening             Visit summary:    POCT strep and POCT COVID negative  Advised on diagnosis, medications and symptomatic treatment.  Presenting signs and symptoms suggestive of viral pharyngitis. I discussed using saltwater gargles and Cepacol throat spray or lozenges to ease sore throat.  Will take Tylenol or Ibuprofen as needed for any pain and/or fever. Advised on signs/symptoms of emergent conditions. Patient advised to let us know if symptoms persist or if She develops any new or worsening symptoms.         Follow up in about 2 years (around 6/11/2026), or if symptoms worsen or fail to improve.   ______________________________________________________________________  Subjective:    Chief Complaint:  Sore throat   HPI:  Georgia is a 59 y.o. year old female here complaining of sore throat, fever, chills,  body aches,  and slight cough. Started Sunday evening.  Patient has not taken anything for her symptoms.       Medications:  Current Outpatient Medications on File Prior to Visit   Medication Sig Dispense Refill    biotin 5,000 mcg TbDL Take 1 tablet by mouth once daily.       buPROPion (WELLBUTRIN SR) 200 MG SR12 TAKE 1 TABLET BY MOUTH TWICE DAILY 180 tablet 3    docusate sodium (COLACE) 100 MG capsule Take 1 capsule (100 mg total) by mouth 2 (two) times daily as needed for Constipation. 60 capsule 0    estradiol 0.05 mg/24 hr td ptsw (VIVELLE-DOT) 0.05 mg/24 hr Place 1 patch onto the skin twice a week.       hydrocortisone (PROCTOSOL HC) 2.5 % rectal cream PLACE RECTALLY 2 (TWO) TIMES DAILY AS NEEDED FOR HEMORRHOIDS. PLACE RECTALLY TWICE DAILY AS DIRECTED Strength: 2.5 % 28.35 g 1     "multivitamin (THERAGRAN) per tablet Take 1 tablet by mouth once daily.      testosterone (ANDROGEL) 1 % (50 mg/5 gram) GlPk Apply 5 g topically once daily.      vitamin D 1000 units Tab Take 1,000 Units by mouth once daily.      rizatriptan (MAXALT-MLT) 5 MG disintegrating tablet Take 1 tablet (5 mg total) by mouth as needed for Migraine (no more than 2 doses in 24hrs). May repeat in 2 hours if needed 10 tablet 3     No current facility-administered medications on file prior to visit.       Review of Systems:  Review of Systems   Constitutional:  Positive for chills and fatigue. Negative for fever.   HENT:  Positive for sore throat. Negative for congestion, ear discharge, ear pain, postnasal drip and rhinorrhea.    Respiratory:  Positive for cough. Negative for shortness of breath and wheezing.    Cardiovascular:  Negative for chest pain and leg swelling.   Musculoskeletal:  Positive for myalgias.       Past Medical History:  Past Medical History:   Diagnosis Date    Allergic rhinitis     Arthritis     Headache(784.0)     Rash        Objective:    Vitals:  Vitals:    06/11/24 1121   BP: 102/62   Temp: 98.5 °F (36.9 °C)   Weight: 62.8 kg (138 lb 7.2 oz)   Height: 5' 6" (1.676 m)   PainSc:   3       Physical Exam  Vitals and nursing note reviewed.   Constitutional:       General: She is not in acute distress.  HENT:      Head: Normocephalic.      Right Ear: Tympanic membrane, ear canal and external ear normal.      Left Ear: Tympanic membrane, ear canal and external ear normal.      Nose: Nose normal. No congestion or rhinorrhea.      Mouth/Throat:      Mouth: Mucous membranes are moist.      Pharynx: Oropharynx is clear. Posterior oropharyngeal erythema present. No oropharyngeal exudate.   Eyes:      General: No scleral icterus.     Pupils: Pupils are equal, round, and reactive to light.   Cardiovascular:      Rate and Rhythm: Normal rate and regular rhythm.      Pulses: Normal pulses.      Heart sounds: Normal heart " sounds.   Pulmonary:      Effort: Pulmonary effort is normal. No respiratory distress.      Breath sounds: Normal breath sounds.   Musculoskeletal:      Cervical back: Neck supple.   Lymphadenopathy:      Cervical: No cervical adenopathy.   Skin:     General: Skin is warm and dry.   Neurological:      Mental Status: She is alert and oriented to person, place, and time.   Psychiatric:         Mood and Affect: Mood normal.         Behavior: Behavior normal.         Thought Content: Thought content normal.         Data:  .          Medical history reviewed, Medications reconciled.          OLAYINKA StevensP-C  Family Medicine

## 2024-06-15 ENCOUNTER — OFFICE VISIT (OUTPATIENT)
Dept: URGENT CARE | Facility: CLINIC | Age: 59
End: 2024-06-15
Payer: COMMERCIAL

## 2024-06-15 VITALS
SYSTOLIC BLOOD PRESSURE: 98 MMHG | TEMPERATURE: 98 F | DIASTOLIC BLOOD PRESSURE: 60 MMHG | BODY MASS INDEX: 22.18 KG/M2 | HEIGHT: 66 IN | HEART RATE: 109 BPM | OXYGEN SATURATION: 99 % | WEIGHT: 138 LBS | RESPIRATION RATE: 17 BRPM

## 2024-06-15 DIAGNOSIS — J04.0 LARYNGITIS: ICD-10-CM

## 2024-06-15 DIAGNOSIS — J02.9 SORE THROAT: ICD-10-CM

## 2024-06-15 DIAGNOSIS — J06.9 VIRAL URI WITH COUGH: Primary | ICD-10-CM

## 2024-06-15 LAB
CTP QC/QA: YES
CTP QC/QA: YES
MOLECULAR STREP A: NEGATIVE
SARS-COV-2 AG RESP QL IA.RAPID: NEGATIVE

## 2024-06-15 PROCEDURE — 87811 SARS-COV-2 COVID19 W/OPTIC: CPT | Mod: QW,S$GLB,, | Performed by: NURSE PRACTITIONER

## 2024-06-15 PROCEDURE — 87651 STREP A DNA AMP PROBE: CPT | Mod: QW,S$GLB,, | Performed by: NURSE PRACTITIONER

## 2024-06-15 PROCEDURE — 99213 OFFICE O/P EST LOW 20 MIN: CPT | Mod: S$GLB,,, | Performed by: NURSE PRACTITIONER

## 2024-06-15 RX ORDER — BENZONATATE 200 MG/1
200 CAPSULE ORAL 3 TIMES DAILY PRN
Qty: 30 CAPSULE | Refills: 0 | Status: SHIPPED | OUTPATIENT
Start: 2024-06-15

## 2024-06-15 RX ORDER — METHYLPREDNISOLONE 4 MG/1
TABLET ORAL
Qty: 21 EACH | Refills: 0 | Status: SHIPPED | OUTPATIENT
Start: 2024-06-15 | End: 2024-07-06

## 2024-06-15 RX ORDER — GUAIFENESIN 600 MG/1
1200 TABLET, EXTENDED RELEASE ORAL 2 TIMES DAILY PRN
Qty: 30 TABLET | Refills: 0 | Status: SHIPPED | OUTPATIENT
Start: 2024-06-15

## 2024-06-15 NOTE — PATIENT INSTRUCTIONS
- You received a steroid, medrol dosepack today.  This can elevate your blood pressure, elevate your blood sugar, water weight gain, nervous energy, redness to the face and dimpling of the skin where the shot goes in.   - Do not use steroids more than 3 times per year.   - If you have diabetes, please check you blood sugar frequently.  - If you have high blood pressure, please check your blood pressure frequently.       INSTRUCTIONS:  - Rest.  - Drink plenty of fluids.  - Take Tylenol and/or Ibuprofen as directed as needed for fever/pain.  Do not take more than the recommended dose.  - follow up with your PCP within the next 1-2 weeks as needed.  - You must understand that you have received an Urgent Care treatment only and that you may be released before all of your medical problems are known or treated.   - You, the patient, will arrange for follow up care as instructed.   - If your condition worsens or fails to improve we recommend that you receive another evaluation at the ER immediately or contact your PCP to discuss your concerns.   - You can call [phone] or [phone] to help schedule an appointment with the appropriate provider.     -If you smoke cigarettes, it would be beneficial for you to stop.

## 2024-08-22 ENCOUNTER — OFFICE VISIT (OUTPATIENT)
Dept: OPTOMETRY | Facility: CLINIC | Age: 59
End: 2024-08-22
Payer: COMMERCIAL

## 2024-08-22 DIAGNOSIS — H04.123 DRY EYE SYNDROME OF BILATERAL LACRIMAL GLANDS: ICD-10-CM

## 2024-08-22 DIAGNOSIS — H43.812 POSTERIOR VITREOUS DETACHMENT OF LEFT EYE: Primary | ICD-10-CM

## 2024-08-22 PROCEDURE — 92014 COMPRE OPH EXAM EST PT 1/>: CPT | Mod: S$GLB,,,

## 2024-08-22 PROCEDURE — 1159F MED LIST DOCD IN RCRD: CPT | Mod: CPTII,S$GLB,,

## 2024-08-22 PROCEDURE — 99999 PR PBB SHADOW E&M-EST. PATIENT-LVL III: CPT | Mod: PBBFAC,,,

## 2024-08-22 PROCEDURE — 3044F HG A1C LEVEL LT 7.0%: CPT | Mod: CPTII,S$GLB,,

## 2024-08-22 NOTE — PROGRESS NOTES
HPI    Urgent IVON 05/28/24    Pt complains of increased floaters and flashes of light OS since Tuesday   morning. Pt stated OS eye pain (lasting few seconds) started on Wednesday   or Thursday of last week. Pt noticed few days after eye pain, increased   floaters and flashes. Pt denies fever and eye pain today. Pt uses systane   BID daily.   Last edited by Ale Julio, OD on 8/22/2024 10:13 AM.            Assessment /Plan     For exam results, see Encounter Report.    Posterior vitreous detachment of left eye    Dry eye syndrome of bilateral lacrimal glands      Acute PVD OS. No angelica's sign, holes, tears, or retinal detachments 360. Ed pt on the nature and etiology of PVDs. Reviewed signs and symptoms of a retinal detachment thoroughly and ed pt to RTC asap if experienced. Pt to return in 8 weeks for PVD follow-up OS.  Ed pt that sharp pains she's experiencing is secondary to dry eye and advised pt increase artificial tears to BID-QID OU daily. Ed pt to RTC if any worsening signs or symptoms.    RTC: 8 weeks for PVD follow-up OS

## 2024-10-17 ENCOUNTER — TELEPHONE (OUTPATIENT)
Dept: FAMILY MEDICINE | Facility: CLINIC | Age: 59
End: 2024-10-17
Payer: COMMERCIAL

## 2024-10-17 NOTE — TELEPHONE ENCOUNTER
----- Message from Gali sent at 10/17/2024 12:03 PM CDT -----  Contact: Self  Type:  Same Day Appointment Request    Caller is requesting a same day appointment.  Caller declined first available appointment listed below.      Name of Caller:  Patient  When is the first available appointment?  10/23  Symptoms:  possibly bladder infection, blood in urine, pressure  Best Call Back Number:  [phone]  Additional Information:   Pt is wanting to see if she can be seen today or tomorrow for this, can we please call pt back to assist. Thank You.

## 2024-10-18 ENCOUNTER — OFFICE VISIT (OUTPATIENT)
Dept: FAMILY MEDICINE | Facility: CLINIC | Age: 59
End: 2024-10-18
Payer: COMMERCIAL

## 2024-10-18 ENCOUNTER — LAB VISIT (OUTPATIENT)
Dept: LAB | Facility: HOSPITAL | Age: 59
End: 2024-10-18
Attending: FAMILY MEDICINE
Payer: COMMERCIAL

## 2024-10-18 ENCOUNTER — PATIENT MESSAGE (OUTPATIENT)
Dept: FAMILY MEDICINE | Facility: CLINIC | Age: 59
End: 2024-10-18

## 2024-10-18 VITALS
BODY MASS INDEX: 22.23 KG/M2 | WEIGHT: 138.31 LBS | SYSTOLIC BLOOD PRESSURE: 102 MMHG | OXYGEN SATURATION: 99 % | HEART RATE: 68 BPM | DIASTOLIC BLOOD PRESSURE: 76 MMHG | HEIGHT: 66 IN

## 2024-10-18 DIAGNOSIS — R39.9 LOWER URINARY TRACT SYMPTOMS (LUTS): Primary | ICD-10-CM

## 2024-10-18 DIAGNOSIS — N30.00 ACUTE INFECTIVE CYSTITIS: ICD-10-CM

## 2024-10-18 LAB
BILIRUB SERPL-MCNC: NEGATIVE MG/DL
BLOOD URINE, POC: NEGATIVE
CLARITY, POC UA: CLEAR
COLOR, POC UA: ABNORMAL
GLUCOSE UR QL STRIP: NEGATIVE
KETONES UR QL STRIP: NEGATIVE
LEUKOCYTE ESTERASE URINE, POC: NEGATIVE
NITRITE, POC UA: NEGATIVE
PH, POC UA: 0.5
PROTEIN, POC: NEGATIVE
SPECIFIC GRAVITY, POC UA: 1.03
UROBILINOGEN, POC UA: 0.2

## 2024-10-18 PROCEDURE — 99999 PR PBB SHADOW E&M-EST. PATIENT-LVL IV: CPT | Mod: PBBFAC,,, | Performed by: FAMILY MEDICINE

## 2024-10-18 PROCEDURE — 87086 URINE CULTURE/COLONY COUNT: CPT | Performed by: FAMILY MEDICINE

## 2024-10-18 RX ORDER — CEFTRIAXONE 1 G/1
1 INJECTION, POWDER, FOR SOLUTION INTRAMUSCULAR; INTRAVENOUS
Status: COMPLETED | OUTPATIENT
Start: 2024-10-18 | End: 2024-10-18

## 2024-10-18 RX ORDER — NITROFURANTOIN 25; 75 MG/1; MG/1
100 CAPSULE ORAL 2 TIMES DAILY
Qty: 6 CAPSULE | Refills: 0 | Status: SHIPPED | OUTPATIENT
Start: 2024-10-18

## 2024-10-18 RX ADMIN — CEFTRIAXONE 1 G: 1 INJECTION, POWDER, FOR SOLUTION INTRAMUSCULAR; INTRAVENOUS at 10:10

## 2024-10-18 NOTE — PROGRESS NOTES
THIS DOCUMENT WAS MADE IN PART WITH VOICE RECOGNITION SOFTWARE.  OCCASIONALLY THIS SOFTWARE WILL MISINTERPRET WORDS OR PHRASES.    Assessment and Plan:    1. Lower urinary tract symptoms (LUTS)        2. Acute infective cystitis  Urine culture    cefTRIAXone injection 1 g    nitrofurantoin, macrocrystal-monohydrate, (MACROBID) 100 MG capsule          PLAN        Assessment & Plan    URINARY TRACT INFECTION (UTI):   Discussed rapid action of Rocephin injection in treating UTI symptoms.   Started Rocephin injection (antibiotic) in office.   Started Macrobid (antibiotic) twice daily for 3 days.   Administered Rocephin injection in office.   Urine culture ordered.   Georgia to drink plenty of water.    MUSCLE CRAMPS:   Recommend using stretching and heat therapy for cramps.   Recommend OTC magnesium supplement as needed for cramps.    ELECTROLYTE BALANCE:   Georgia to consume electrolytes with drinks.    MEDICATIONS/SUPPLEMENTS:   Explained that magnesium supplements can sometimes cause loose stools.    FOLLOW UP:   Contact the office if anything else is needed.             ______________________________________________________________________  Subjective:    Chief Complaint:  Chief Complaint   Patient presents with    Follow-up     Possible uti started Tuesday morning burning, itching and smell        HPI:  Georgia is a 59 y.o. year old         History of Present Illness    CHIEF COMPLAINT:  Georgia presents today for urinary symptoms.    URINARY SYMPTOMS:  She reports experiencing urinary tract infection symptoms since Tuesday, including burning sensation, irritation, pain, and pressure associated with urination. She also notes a frequent urge to urinate. She denies any fever. She had a previous UTI decades ago.    ABDOMINAL AND LEG PAIN:  She experienced severe cramps extending down to her legs the night before the visit, which she describes as similar to menstrual cramps despite having undergone a hysterectomy 10  "years ago. She required the use of a heating pad for relief, including sleeping with it the previous night.    MEDICAL HISTORY:  She underwent a hysterectomy 10 years ago. She has a third kidney, which she reports is asymptomatic and "totally fine." Normal kidney function was reported based on tests performed with Dr. Cuello in May.    FAMILY HISTORY:  One of her sisters also has a third kidney.    MEDICATIONS AND ALLERGIES:  She reports no known drug allergies.    DIET:  She consumed a beet salad for dinner on Monday night, which was her entire meal for the evening.      ROS:  ROS as indicated in HPI.           Past Medical History:  Past Medical History:   Diagnosis Date    Allergic rhinitis     Arthritis     Headache(784.0)     Rash        Past Surgical History:  Past Surgical History:   Procedure Laterality Date    COLONOSCOPY N/A 07/20/2020    Procedure: COLONOSCOPY;  Surgeon: Lalo Jimenez Jr., MD;  Location: [facility];  Service: Endoscopy;  Laterality: N/A;    HYSTERECTOMY  10/2014    uterine fibroids       Family History:  Family History   Problem Relation Name Age of Onset    Diabetes Mother      Hypertension Mother      Cancer Father          lung ca    Heart disease Father      Hyperlipidemia Father      Hypertension Father      Cancer Sister          Non Hodgkin's lymphoma    No Known Problems Daughter      No Known Problems Son      Glaucoma Neg Hx      Retinal detachment Neg Hx      Macular degeneration Neg Hx         Social History:  Social History     Socioeconomic History    Marital status:    Tobacco Use    Smoking status: Never     Passive exposure: Never    Smokeless tobacco: Never   Substance and Sexual Activity    Alcohol use: Yes     Comment: occasional glass of wine    Drug use: No    Sexual activity: Yes     Partners: Male     Birth control/protection: See Surgical Hx   Social History Narrative    She currently works as a .      Social Drivers of Health "     Financial Resource Strain: Low Risk  (1/26/2024)    Overall Financial Resource Strain (CARDIA)     Difficulty of Paying Living Expenses: Not hard at all   Food Insecurity: No Food Insecurity (1/26/2024)    Hunger Vital Sign     Worried About Running Out of Food in the Last Year: Never true     Ran Out of Food in the Last Year: Never true   Transportation Needs: No Transportation Needs (1/26/2024)    PRAPARE - Transportation     Lack of Transportation (Medical): No     Lack of Transportation (Non-Medical): No   Physical Activity: Sufficiently Active (1/26/2024)    Exercise Vital Sign     Days of Exercise per Week: 6 days     Minutes of Exercise per Session: 50 min   Stress: No Stress Concern Present (1/26/2024)    [employer] - Occupational Stress Questionnaire     Feeling of Stress : Not at all   Housing Stability: Low Risk  (1/26/2024)    Housing Stability Vital Sign     Unable to Pay for Housing in the Last Year: No     Number of Places Lived in the Last Year: 1     Unstable Housing in the Last Year: No       Medications:  Current Outpatient Medications on File Prior to Visit   Medication Sig Dispense Refill    biotin 5,000 mcg TbDL Take 1 tablet by mouth once daily.       buPROPion (WELLBUTRIN SR) 200 MG SR12 TAKE 1 TABLET BY MOUTH TWICE DAILY 180 tablet 3    docusate sodium (COLACE) 100 MG capsule Take 1 capsule (100 mg total) by mouth 2 (two) times daily as needed for Constipation. 60 capsule 0    estradiol 0.05 mg/24 hr td ptsw (VIVELLE-DOT) 0.05 mg/24 hr Place 1 patch onto the skin twice a week.       multivitamin (THERAGRAN) per tablet Take 1 tablet by mouth once daily.      testosterone (ANDROGEL) 1 % (50 mg/5 gram) GlPk Apply 5 g topically once daily.      vitamin D 1000 units Tab Take 1,000 Units by mouth once daily.      rizatriptan (MAXALT-MLT) 5 MG disintegrating tablet Take 1 tablet (5 mg total) by mouth as needed for Migraine (no more than 2 doses in 24hrs). May  "repeat in 2 hours if needed 10 tablet 3     No current facility-administered medications on file prior to visit.       Allergies:  Patient has no known allergies.    Immunizations:  Immunization History   Administered Date(s) Administered    COVID-19, vector-nr, rS-Ad26, PF (Franny) 07/21/2021    Influenza - Quadrivalent - PF *Preferred* (6 months and older) 10/25/2023    Tdap 06/17/2016       Review of Systems:  Review of Systems   All other systems reviewed and are negative.      Objective:    Vitals:  Vitals:    10/18/24 1015   BP: 102/76   Pulse: 68   SpO2: 99%   Weight: 62.8 kg (138 lb 5.4 oz)   Height: 5' 6" (1.676 m)   PainSc:   3       Physical Exam  Vitals reviewed.   Constitutional:       General: She is not in acute distress.  HENT:      Head: Normocephalic and atraumatic.   Eyes:      Pupils: Pupils are equal, round, and reactive to light.   Cardiovascular:      Rate and Rhythm: Normal rate and regular rhythm.      Heart sounds: No murmur heard.     No friction rub.   Pulmonary:      Effort: Pulmonary effort is normal.      Breath sounds: Normal breath sounds.   Abdominal:      General: Bowel sounds are normal. There is no distension.      Palpations: Abdomen is soft.      Tenderness: There is no abdominal tenderness.   Musculoskeletal:      Cervical back: Neck supple.   Skin:     General: Skin is warm and dry.      Findings: No rash.   Psychiatric:         Behavior: Behavior normal.             Victor Manuel Carson MD  Family Medicine      "

## 2024-10-20 LAB
BACTERIA UR CULT: NORMAL
BACTERIA UR CULT: NORMAL

## 2025-03-11 ENCOUNTER — OFFICE VISIT (OUTPATIENT)
Dept: PODIATRY | Facility: CLINIC | Age: 60
End: 2025-03-11
Payer: COMMERCIAL

## 2025-03-11 VITALS — WEIGHT: 138.44 LBS | BODY MASS INDEX: 22.25 KG/M2 | HEIGHT: 66 IN

## 2025-03-11 DIAGNOSIS — L84 CORN OR CALLUS: ICD-10-CM

## 2025-03-11 DIAGNOSIS — M20.42 HAMMER TOES OF BOTH FEET: ICD-10-CM

## 2025-03-11 DIAGNOSIS — M21.621 TAILOR'S BUNION OF RIGHT FOOT: Primary | ICD-10-CM

## 2025-03-11 DIAGNOSIS — L85.1 PLANTAR POROKERATOSIS, ACQUIRED: ICD-10-CM

## 2025-03-11 DIAGNOSIS — M20.41 HAMMER TOES OF BOTH FEET: ICD-10-CM

## 2025-03-11 PROCEDURE — 99999 PR PBB SHADOW E&M-EST. PATIENT-LVL III: CPT | Mod: PBBFAC,,, | Performed by: PODIATRIST

## 2025-03-11 PROCEDURE — 1159F MED LIST DOCD IN RCRD: CPT | Mod: CPTII,S$GLB,, | Performed by: PODIATRIST

## 2025-03-11 PROCEDURE — 1160F RVW MEDS BY RX/DR IN RCRD: CPT | Mod: CPTII,S$GLB,, | Performed by: PODIATRIST

## 2025-03-11 PROCEDURE — 3008F BODY MASS INDEX DOCD: CPT | Mod: CPTII,S$GLB,, | Performed by: PODIATRIST

## 2025-03-11 PROCEDURE — 99203 OFFICE O/P NEW LOW 30 MIN: CPT | Mod: S$GLB,,, | Performed by: PODIATRIST

## 2025-03-11 RX ORDER — ESTRADIOL 0.04 MG/D
FILM, EXTENDED RELEASE TRANSDERMAL
COMMUNITY

## 2025-03-11 RX ORDER — BUPROPION HYDROCHLORIDE 200 MG/1
1 TABLET, EXTENDED RELEASE ORAL 2 TIMES DAILY
COMMUNITY

## 2025-03-11 NOTE — PROGRESS NOTES
Subjective:      Patient ID: Georgia Gomez is a 59 y.o. female.    Chief Complaint: Foot Pain    Georgia is a 59 y.o. female who presents to the podiatry clinic  with complaint of  right foot pain around the fifth met head and base of the fifth metatarsal at the styloid process. Onset of the symptoms was several months ago. Precipitating event: none known. Current symptoms include: ability to bear weight, but with some pain and worsening symptoms after a period of activity. Aggravating factors: any weight bearing. Symptoms have gradually worsened. Patient has had no prior foot problems. Evaluation to date: none. Treatment to date:  wears hoka shoes . Patients rates pain 3/10 on pain scale.    Review of Systems   Constitutional: Negative for chills and fever.   Cardiovascular:  Negative for claudication and leg swelling.   Respiratory:  Negative for shortness of breath.    Skin:  Negative for itching, nail changes and rash.        Hard spots on the foot   Musculoskeletal:  Negative for muscle cramps, muscle weakness and myalgias.   Gastrointestinal:  Negative for nausea and vomiting.   Neurological:  Negative for focal weakness, loss of balance, numbness and paresthesias.           Objective:      Physical Exam  Constitutional:       General: She is not in acute distress.     Appearance: She is well-developed. She is not diaphoretic.   Cardiovascular:      Pulses:           Dorsalis pedis pulses are 2+ on the right side and 2+ on the left side.        Posterior tibial pulses are 2+ on the right side and 2+ on the left side.      Comments: < 3 sec capillary refill time to toes 1-5 bilateral. Toes and feet are warm to touch proximally with normal distal cooling b/l. There is some hair growth on the feet and toes b/l. There is no edema b/l. No spider veins or varicosities present b/l.     Musculoskeletal:      Comments: Equinus noted b/l ankles with < 10 deg DF noted. MMT 5/5 in DF/PF/Inv/Ev resistance with no  reproduction of pain in any direction. Passive range of motion of ankle and pedal joints is painless b/l.    Reducible extensor and flexor contractures at the MTPJ and PIPJ of toes 2-5, bilat.     Tailor bunion present 5th mtpj right worse than left with medial deviation of 5th toe, prominent bony bump lateral 5th mtpj, and pain to palpation without evidence of trauma or infection.           Skin:     General: Skin is warm and dry.      Coloration: Skin is not pale.      Findings: Lesion present. No abrasion, bruising, burn, ecchymosis, erythema, laceration, petechiae or rash.      Nails: There is no clubbing.      Comments: Skin temperature, texture and turgor within normal limits.    At the plantar second and fifth metatarsal heads right worse than left and lateral fifth metatarsal base there is are focal hyperkeratotic lesion with a dense core noted. Pain with palpation overlying the lesions.       Neurological:      Mental Status: She is alert and oriented to person, place, and time.      Sensory: No sensory deficit.      Motor: No tremor, atrophy or abnormal muscle tone.      Comments: Negative tinel sign bilateral.   Psychiatric:         Behavior: Behavior normal.               Assessment:       Encounter Diagnoses   Name Primary?    Jamison's bunion of right foot Yes    Hammer toes of both feet     Plantar porokeratosis, acquired     Corn or callus          Plan:       Georiga was seen today for foot pain.    Diagnoses and all orders for this visit:    Jamison's bunion of right foot    Hammer toes of both feet    Plantar porokeratosis, acquired    Corn or callus      I counseled the patient on her conditions, their implications and medical management.    Avoid barefoot or flats    Wear supportive shoes as much as possible, make sure the shoes are wide so as not to rub on the fifth metatarsal styloid process    Consider custom orthotic inserts    Use ebanel cream to the area of callus build up    Return PRN if  pain persists    Bennie Frederick DPM

## 2025-03-17 ENCOUNTER — PATIENT MESSAGE (OUTPATIENT)
Dept: FAMILY MEDICINE | Facility: CLINIC | Age: 60
End: 2025-03-17
Payer: COMMERCIAL

## 2025-03-19 ENCOUNTER — TELEPHONE (OUTPATIENT)
Dept: FAMILY MEDICINE | Facility: CLINIC | Age: 60
End: 2025-03-19
Payer: COMMERCIAL

## 2025-03-24 ENCOUNTER — OFFICE VISIT (OUTPATIENT)
Dept: FAMILY MEDICINE | Facility: CLINIC | Age: 60
End: 2025-03-24
Payer: COMMERCIAL

## 2025-03-24 VITALS — BODY MASS INDEX: 22.25 KG/M2 | WEIGHT: 138.44 LBS | HEIGHT: 66 IN

## 2025-03-24 DIAGNOSIS — M54.2 NECK PAIN ON LEFT SIDE: Primary | ICD-10-CM

## 2025-03-24 DIAGNOSIS — E66.3 OVERWEIGHT: ICD-10-CM

## 2025-03-24 RX ORDER — TIRZEPATIDE 2.5 MG/.5ML
2.5 INJECTION, SOLUTION SUBCUTANEOUS
Qty: 6 ML | Refills: 4 | Status: SHIPPED | OUTPATIENT
Start: 2025-03-24

## 2025-03-24 RX ORDER — BETAMETHASONE SODIUM PHOSPHATE AND BETAMETHASONE ACETATE 3; 3 MG/ML; MG/ML
6 INJECTION, SUSPENSION INTRA-ARTICULAR; INTRALESIONAL; INTRAMUSCULAR; SOFT TISSUE
Status: SHIPPED | OUTPATIENT
Start: 2025-03-27

## 2025-03-24 NOTE — PROGRESS NOTES
The patient location is: work  The chief complaint leading to consultation is: f/u    Visit type: audiovisual    Face to Face time with patient:   10 minutes of total time spent on the encounter, which includes face to face time and non-face to face time preparing to see the patient (eg, review of tests), Obtaining and/or reviewing separately obtained history, Documenting clinical information in the electronic or other health record, Independently interpreting results (not separately reported) and communicating results to the patient/family/caregiver, or Care coordination (not separately reported).         Each patient to whom he or she provides medical services by telemedicine is:  (1) informed of the relationship between the physician and patient and the respective role of any other health care provider with respect to management of the patient; and (2) notified that he or she may decline to receive medical services by telemedicine and may withdraw from such care at any time.    Notes:      Chief Complaint:  Chief Complaint   Patient presents with    Follow-up     Lump in neck x 1 month        HPI:  Georgia is a 59 y.o. year old     History of Present Illness    CHIEF COMPLAINT:  Georgia presents today for neck pain.    NECK PAIN:  She reports neck pain near the ear present for one month. Her  suggests it may be tension headache. She denies taking any medications for pain management.    WEIGHT MANAGEMENT:  She has maintained weight within a 3-pound range over the past 2 years, but reports recent 5-pound weight gain this month despite continuing daily exercise routine. She previously used Mounjaro 2.5 mg for 4 months.      ROS:  General: +weight gain, +change in weight  Musculoskeletal: +neck pain           Review of Systems   Constitutional:  Negative for activity change and unexpected weight change.   HENT:  Negative for hearing loss, rhinorrhea and trouble swallowing.    Eyes:  Negative for discharge and  "visual disturbance.   Respiratory:  Negative for chest tightness and wheezing.    Cardiovascular:  Negative for chest pain and palpitations.   Gastrointestinal:  Negative for blood in stool, constipation, diarrhea and vomiting.   Endocrine: Negative for polydipsia and polyuria.   Genitourinary:  Negative for difficulty urinating, dysuria, hematuria and menstrual problem.   Musculoskeletal:  Positive for neck pain. Negative for arthralgias and joint swelling.   Neurological:  Negative for weakness and headaches.   Psychiatric/Behavioral:  Negative for confusion and dysphoric mood.          Past Medical History:  Past Medical History:   Diagnosis Date    Allergic rhinitis     Arthritis     Headache(784.0)     Rash          Vitals:  Vitals:    03/24/25 1258   Weight: 62.8 kg (138 lb 7.2 oz)   Height: 5' 6" (1.676 m)       BP Readings from Last 5 Encounters:   10/18/24 102/76   06/15/24 98/60   06/11/24 102/62   05/06/24 100/60   03/23/24 (!) 116/52       The 10-year ASCVD risk score (Heath SANCHEZ, et al., 2019) is: 1.5%    Values used to calculate the score:      Age: 59 years      Sex: Female      Is Non- : No      Diabetic: No      Tobacco smoker: No      Systolic Blood Pressure: 102 mmHg      Is BP treated: No      HDL Cholesterol: 64 mg/dL      Total Cholesterol: 168 mg/dL      Physical Exam:  Physical Exam  Vitals and nursing note reviewed.   Constitutional:       General: She is not in acute distress.     Appearance: Normal appearance. She is well-developed.   HENT:      Head: Normocephalic and atraumatic.   Eyes:      General: No scleral icterus.        Right eye: No discharge.         Left eye: No discharge.      Conjunctiva/sclera: Conjunctivae normal.   Pulmonary:      Effort: Pulmonary effort is normal. No respiratory distress.   Skin:     General: Skin is warm and dry.   Neurological:      General: No focal deficit present.      Mental Status: She is alert and oriented to person, place, " and time.   Psychiatric:         Mood and Affect: Mood normal.         Behavior: Behavior normal.             Assessment & Plan:  Neck pain on left side  -     betamethasone acetate-betamethasone sodium phosphate injection 6 mg    Overweight  -     tirzepatide (MOUNJARO) 2.5 mg/0.5 mL PnIj; Inject 2.5 mg into the skin every 7 days.  Dispense: 6 mL; Refill: 4         Assessment & Plan    IMPRESSION:  Assessed neck pain, likely due to muscle tension.  Recommend anti-inflammatory and muscle relaxer for treatment.  Considered physical therapy as potential next step if initial treatment ineffective.  Evaluated weight management strategy, considering maintenance dose of Mounjaro.  Offered alternative of Toradol or steroid injection if neck pain persists.    PLAN SUMMARY:   Started naproxen: 2 times daily with food and water   Restarted Flexeril (cyclobenzaprine): 1/2 to 1 tablet up to 3 times daily   Restarted Mounjaro 2.5 mg, pending insurance approval   Standing order for Toradol or steroid injection if neck pain persists   Contact office for medication refills if needed   Notify office about insurance coverage for Mounjaro and preferred dosage/administration   Call to schedule nurse visit on Thursday for injection if neck pain persists    PATIENT EDUCATION:   Explained that Tylenol may not be effective for this type of neck pain.   Informed about potential drowsiness from muscle relaxer.   Discussed various approaches to Mounjaro maintenance dosing.    MEDICATIONS:   Started naproxen: Take 2 times daily with food and plenty of water.   Restarted Flexeril (cyclobenzaprine): Take 1/2 to 1 tablet up to 3 times daily, definitely at night.   Restarted Mounjaro 2.5 mg: Pending insurance approval.   Standing order for Toradol or steroid injection on Thursday if neck pain persists.    FOLLOW UP:   Contact office for medication refills if needed before resolution of symptoms.   Call or message to schedule nurse visit on  Thursday for injection if neck pain persists.   Notify office about insurance coverage for Mounjaro and preferred dosage/administration method if not covered.             This note was generated with the assistance of ambient listening technology. Verbal consent was obtained by the patient and accompanying visitor(s) for the recording of patient appointment to facilitate this note. I attest to having reviewed and edited the generated note for accuracy, though some syntax or spelling errors may persist. Please contact the author of this note for any clarification.

## 2025-04-12 NOTE — TELEPHONE ENCOUNTER
Care Due:                  Date            Visit Type   Department     Provider  --------------------------------------------------------------------------------                                ESTABLISHED                              PATIENT -    [facility]  Last Visit: 03-      VIRTUAL      MEDICINE       Grace Cuello                              MYCHART                              FOLLOWUP/OF  [facility]  Next Visit: 04-      FICE VISIT   MEDICINE       Grace Cuello                                                            Last  Test          Frequency    Reason                     Performed    Due Date  --------------------------------------------------------------------------------    HBA1C.......  6 months...  tirzepatide..............  05- 11-    [facility] Embedded Care Due Messages. Reference number: [id number].   4/12/2025 4:37:20 AM CDT

## 2025-04-12 NOTE — TELEPHONE ENCOUNTER
Refill Routing Note   Medication(s) are not appropriate for processing by [facility] for the following reason(s):        No active prescription written by provider    [facility] action(s):  Defer     Requires labs : Yes      Medication Therapy Plan: FOVS      Appointments  past 12m or future 3m with PCP    Date Provider   Last Visit   3/24/2025 Grace Cuello MD   Next Visit   4/16/2025 Grace Cuello MD   ED visits in past 90 days: 0        Note composed:4:13 PM 04/12/2025

## 2025-04-15 RX ORDER — BUPROPION HYDROCHLORIDE 200 MG/1
200 TABLET, EXTENDED RELEASE ORAL 2 TIMES DAILY
Qty: 180 TABLET | Refills: 3 | Status: SHIPPED | OUTPATIENT
Start: 2025-04-15

## 2025-04-16 ENCOUNTER — TELEPHONE (OUTPATIENT)
Dept: PAIN MEDICINE | Facility: CLINIC | Age: 60
End: 2025-04-16
Payer: COMMERCIAL

## 2025-04-16 NOTE — TELEPHONE ENCOUNTER
----- Message from RT Mehreen sent at 4/16/2025 10:17 AM CDT -----  Regarding: [facility] ER Visit f/u 4/14/2025  Please call patient to schedule appointment for further evaluation/treatment for strain of neck muscle.Mehreen Hunter, [facility] Center[facility] Nav[facility]/Case Management [phone]

## 2025-04-17 ENCOUNTER — HOSPITAL ENCOUNTER (OUTPATIENT)
Dept: RADIOLOGY | Facility: HOSPITAL | Age: 60
Discharge: HOME | End: 2025-04-17
Attending: ANESTHESIOLOGY
Payer: COMMERCIAL

## 2025-04-17 ENCOUNTER — OFFICE VISIT (OUTPATIENT)
Dept: PAIN MEDICINE | Facility: CLINIC | Age: 60
End: 2025-04-17
Payer: COMMERCIAL

## 2025-04-17 VITALS
DIASTOLIC BLOOD PRESSURE: 55 MMHG | WEIGHT: 144.63 LBS | HEART RATE: 81 BPM | BODY MASS INDEX: 23.24 KG/M2 | SYSTOLIC BLOOD PRESSURE: 99 MMHG | HEIGHT: 66 IN

## 2025-04-17 DIAGNOSIS — M47.812 CERVICAL SPONDYLOSIS: ICD-10-CM

## 2025-04-17 DIAGNOSIS — M54.2 CERVICALGIA: ICD-10-CM

## 2025-04-17 DIAGNOSIS — M54.2 CERVICALGIA: Primary | ICD-10-CM

## 2025-04-17 DIAGNOSIS — M79.18 MYOFASCIAL PAIN: ICD-10-CM

## 2025-04-17 PROCEDURE — 3078F DIAST BP <80 MM HG: CPT | Mod: CPTII,S$GLB,, | Performed by: ANESTHESIOLOGY

## 2025-04-17 PROCEDURE — 1160F RVW MEDS BY RX/DR IN RCRD: CPT | Mod: CPTII,S$GLB,, | Performed by: ANESTHESIOLOGY

## 2025-04-17 PROCEDURE — 99999 PR PBB SHADOW E&M-EST. PATIENT-LVL V: CPT | Mod: PBBFAC,,, | Performed by: ANESTHESIOLOGY

## 2025-04-17 PROCEDURE — 99204 OFFICE O/P NEW MOD 45 MIN: CPT | Mod: S$GLB,,, | Performed by: ANESTHESIOLOGY

## 2025-04-17 PROCEDURE — 1159F MED LIST DOCD IN RCRD: CPT | Mod: CPTII,S$GLB,, | Performed by: ANESTHESIOLOGY

## 2025-04-17 PROCEDURE — 3074F SYST BP LT 130 MM HG: CPT | Mod: CPTII,S$GLB,, | Performed by: ANESTHESIOLOGY

## 2025-04-17 PROCEDURE — 3008F BODY MASS INDEX DOCD: CPT | Mod: CPTII,S$GLB,, | Performed by: ANESTHESIOLOGY

## 2025-04-17 PROCEDURE — 72040 X-RAY EXAM NECK SPINE 2-3 VW: CPT | Mod: TC,PO

## 2025-04-17 PROCEDURE — 72040 X-RAY EXAM NECK SPINE 2-3 VW: CPT | Mod: 26,,, | Performed by: RADIOLOGY

## 2025-04-17 RX ORDER — KETOROLAC TROMETHAMINE 30 MG/ML
30 INJECTION, SOLUTION INTRAMUSCULAR; INTRAVENOUS
Status: SHIPPED | OUTPATIENT
Start: 2025-04-17

## 2025-04-17 NOTE — PROGRESS NOTES
[facility] New Patient Evaluation      Referred by: Dr. Jero Jarrett    PCP:     CC:   Chief Complaint   Patient presents with    Neck Pain    Pain    Shoulder Pain     Lt shoulder          4/17/2025     8:05 AM 6/24/2020     8:38 AM 8/1/2019     3:49 PM   Last 3 PDI Scores   Pain Disability Index (PDI) 14 0 9         HPI:   Georgia Gomez is a 59 y.o. female patient who has a past medical history of Allergic rhinitis, Arthritis, Headache(784.0), and Rash. She presents with neck pain.  She noticed her pain started on February 2, 2025.  Since that time she has been having gradually worsening pain.  Today she reports her pain is intermittent on the left side of her neck that can radiate down into the left shoulder.  When the pain comes it is tight, deep, sharp.  The pain can be worse with bending, coughing, sneezing and she finds some relief with ice.      Pain Intervention History:      Past Spine Surgical History:      Past and current medications:  Antineuropathics:  NSAIDs:  Physical therapy: chiropractic care  Antidepressants:  Muscle relaxers: flexeril   Opioids: oxycodone   Antiplatelets/Anticoagulants:    History:  Current Medications[1]    Past Medical History:   Diagnosis Date    Allergic rhinitis     Arthritis     Headache(784.0)     Rash        Past Surgical History:   Procedure Laterality Date    COLONOSCOPY N/A 07/20/2020    Procedure: COLONOSCOPY;  Surgeon: Lalo Jimenez Jr., MD;  Location: [facility];  Service: Endoscopy;  Laterality: N/A;    HYSTERECTOMY  10/2014    uterine fibroids       Family History   Problem Relation Name Age of Onset    Diabetes Mother      Hypertension Mother      Cancer Father          lung ca    Heart disease Father      Hyperlipidemia Father      Hypertension Father      Cancer Sister          Non Hodgkin's lymphoma    No Known Problems Daughter      No Known Problems Son      Glaucoma Neg Hx      Retinal detachment Neg Hx      Macular degeneration Neg Hx    Pharmacy requested refills that are already active on file. Refused request to pharmacy.   "      Social History     Socioeconomic History    Marital status:    Tobacco Use    Smoking status: Never     Passive exposure: Never    Smokeless tobacco: Never   Substance and Sexual Activity    Alcohol use: Yes     Comment: occasional glass of wine    Drug use: No    Sexual activity: Yes     Partners: Male     Birth control/protection: See Surgical Hx   Social History Narrative    She currently works as a .      Social Drivers of Health     Financial Resource Strain: Low Risk  (3/24/2025)    Overall Financial Resource Strain (CARDIA)     Difficulty of Paying Living Expenses: Not hard at all   Food Insecurity: No Food Insecurity (3/24/2025)    Hunger Vital Sign     Worried About Running Out of Food in the Last Year: Never true     Ran Out of Food in the Last Year: Never true   Transportation Needs: No Transportation Needs (3/24/2025)    PRAPARE - Transportation     Lack of Transportation (Medical): No     Lack of Transportation (Non-Medical): No   Physical Activity: Sufficiently Active (3/24/2025)    Exercise Vital Sign     Days of Exercise per Week: 6 days     Minutes of Exercise per Session: 40 min   Stress: No Stress Concern Present (3/24/2025)    [employer] [employer] - Occupational Stress Questionnaire     Feeling of Stress : Not at all   Housing Stability: Low Risk  (3/24/2025)    Housing Stability Vital Sign     Unable to Pay for Housing in the Last Year: No     Number of Times Moved in the Last Year: 0     Homeless in the Last Year: No       Review of patient's allergies indicates:  No Known Allergies    Review of Systems:  12 point review of systems is negative.    Physical Exam:  Vitals:    04/17/25 0810   BP: (!) 99/55   Pulse: 81   Weight: 65.6 kg (144 lb 10 oz)   Height: 5' 6" (1.676 m)   PainSc:   5   PainLoc: Neck     Body mass index is 23.34 kg/m².    Gen: NAD  Psych: mood appropriate for given condition  HEENT: eyes anicteric   CV: RRR  HEENT: anicteric "   Respiratory: non-labored, no signs of respiratory distress  Abd: non-distended  Skin: warm, dry and intact.  Gait: No antalgic gait.     Mild pain with cervical axial facet loading on the left  Tenderness to palpation with trigger points over the left trapezius    Sensory:  Intact and symmetrical to light touch in C4-T1 dermatomes bilaterally.     Motor:    Right Left   C4 Shoulder Abduction  5  5   C5 Elbow Flexion    5  5   C6 Wrist Extension  5  5   C7 Elbow Extension   5  5   C8/T1 Hand Intrinsics   5  5      Right Left   Triceps DTR 2+ 2+   Biceps DTR 2+ 2+        Patellar DTR 2+ 2+        Harper positive  Absent                 Labs:  Lab Results   Component Value Date    HGBA1C 5.0 05/06/2024       Lab Results   Component Value Date    WBC 5.04 05/06/2024    HGB 13.4 05/06/2024    HCT 40.7 05/06/2024    MCV 89 05/06/2024     05/06/2024           Imaging:  Xray cervical spine 4/14/25  FINDINGS:  New minimal retrolisthesis is at C5-6 with mild C5-6 disc space narrowing.  Multilevel facet hypertrophy is present.  No fracture.    Diagnosis:  1. Cervicalgia  -     Ambulatory Referral/Consult to Physical Therapy; Future; Expected date: 04/24/2025  -     X-Ray Cervical Spine Flexion And Extension Only; Future; Expected date: 04/17/2025  -     MRI Cervical Spine Without Contrast; Future; Expected date: 04/17/2025  -     ketorolac injection 30 mg    2. Cervical spondylosis  -     Ambulatory referral/consult to Spine Care    3. Myofascial pain  -     Ambulatory Referral/Consult to Physical Therapy; Future; Expected date: 04/24/2025          Georgia Gomez is a 59 y.o. female patient who has a past medical history of Allergic rhinitis, Arthritis, Headache(784.0), and Rash. She presents with neck pain.  She noticed her pain started on February 2, 2025.  Since that time she has been having gradually worsening pain.  Today she reports her pain is intermittent on the left side of her neck that can radiate down  into the left shoulder.  When the pain comes it is tight, deep, sharp.  The pain can be worse with bending, coughing, sneezing and she finds some relief with ice.    - on exam she has full strength of her upper extremities intact sensation to light touch bilateral C4-T1.  2+ bilateral triceps, biceps, patellar DTR.  Padmini's positive on the right.  Mild pain with cervical axial facet loading on the left.  Tenderness to palpation with trigger points over the left trapezius  - I independently reviewed x-ray of the cervical spine and she has some minimal listhesis at C5-6 and mild disc space narrowing at C5-6.  She has multilevel bilateral facet arthropathy  - over the past 8 weeks she has tried chiropractic care as well as home exercise and anti-inflammatories however she continues to have significant left-sided neck pain going down in her shoulder  - she currently is taking an oral prednisone taper.  She continues to use Flexeril for the myofascial component of her pain.  I have recommended a referral for physical therapy specifically for dry needling for myofascial and soft tissue pain  - I am going to get an x-ray with flexion-extension dated rule out any instability of her listhesis  - I am also going to get an MRI of the cervical spine to better evaluate her neural anatomy  - 30 mg IM Toradol injection today for inflammatory pain.  She understands not to take any anti-inflammatories for the next 5 days  - she will reach out to me in a few weeks to let me know how she is doing with dry needling and physical therapy.      : Not applicable    Oz Pizano M.D.  Interventional Pain Medicine / Anesthesiology    This note was completed with dictation software and grammatical errors may exist.         [1]   Current Outpatient Medications:     biotin 5,000 mcg TbDL, Take 1 tablet by mouth once daily. , Disp: , Rfl:     buPROPion (WELLBUTRIN SR) 200 MG SR12, TAKE 1 TABLET BY MOUTH TWICE DAILY, Disp: 180 tablet, Rfl:  3    buPROPion (WELLBUTRIN SR) 200 MG SR12, Take 1 tablet by mouth 2 (two) times daily., Disp: , Rfl:     buPROPion (WELLBUTRIN SR) 200 MG SR12, TAKE 1 TABLET BY MOUTH TWICE  DAILY, Disp: 180 tablet, Rfl: 3    estradioL (LYLLANA) 0.0375 mg/24 hr, , Disp: , Rfl:     estradiol 0.05 mg/24 hr td ptsw (VIVELLE-DOT) 0.05 mg/24 hr, Place 1 patch onto the skin twice a week. , Disp: , Rfl:     multivitamin (THERAGRAN) per tablet, Take 1 tablet by mouth once daily., Disp: , Rfl:     oxyCODONE-acetaminophen (PERCOCET) 5-325 mg per tablet, Take 1 tablet by mouth every 6 (six) hours as needed for Pain., Disp: 8 tablet, Rfl: 0    predniSONE (DELTASONE) 20 MG tablet, Take 2 tablets (40 mg total) by mouth once daily for 2 days, THEN 1 tablet (20 mg total) once daily for 2 days, THEN 0.5 tablets (10 mg total) once daily for 2 days., Disp: 7 tablet, Rfl: 0    testosterone (ANDROGEL) 1 % (50 mg/5 gram) GlPk, Apply 5 g topically once daily., Disp: , Rfl:     tirzepatide (MOUNJARO) 2.5 mg/0.5 mL PnIj, Inject 2.5 mg into the skin every 7 days., Disp: 6 mL, Rfl: 4    vitamin D 1000 units Tab, Take 1,000 Units by mouth once daily., Disp: , Rfl:     Current Facility-Administered Medications:     betamethasone acetate-betamethasone sodium phosphate injection 6 mg, 6 mg, Intramuscular, 1 time in Clinic/HOD,     ketorolac injection 30 mg, 30 mg, Intramuscular, 1 time in Clinic/HOD,

## 2025-04-24 ENCOUNTER — CLINICAL SUPPORT (OUTPATIENT)
Dept: REHABILITATION | Facility: HOSPITAL | Age: 60
End: 2025-04-24
Attending: ANESTHESIOLOGY
Payer: COMMERCIAL

## 2025-04-24 DIAGNOSIS — R29.898 DECREASED ROM OF NECK: ICD-10-CM

## 2025-04-24 DIAGNOSIS — M79.18 MYOFASCIAL PAIN: ICD-10-CM

## 2025-04-24 DIAGNOSIS — M54.2 CERVICALGIA: ICD-10-CM

## 2025-04-24 DIAGNOSIS — M54.2 NECK PAIN: Primary | ICD-10-CM

## 2025-04-24 PROCEDURE — 97140 MANUAL THERAPY 1/> REGIONS: CPT | Mod: PN

## 2025-04-24 PROCEDURE — 97162 PT EVAL MOD COMPLEX 30 MIN: CPT | Mod: PN

## 2025-04-24 NOTE — PROGRESS NOTES
Outpatient Rehab    Physical Therapy Evaluation    Patient Name: Georgia Gomez  MRN: [id number]  YOB: 1965  Encounter Date: 4/24/2025    Therapy Diagnosis:   Encounter Diagnoses   Name Primary?    Cervicalgia     Myofascial pain     Neck pain Yes    Decreased ROM of neck      Physician: Oz Pizano MD    Physician Orders: Eval and Treat  Medical Diagnosis: Cervicalgia  Myofascial pain    Visit # / Visits Authorized:  1 / 1  Insurance Authorization Period: 4/17/2025 to 12/31/2025  Date of Evaluation: 4/24/2025  Plan of Care Certification: 4/24/2025 to 6/19/2025     Time In: 1100   Time Out: 1200  Total Time: 60   Total Billable Time:      Intake Outcome Measure for FOTO Survey    Therapist reviewed FOTO scores for Georgia Gomez on 4/24/2025.   FOTO report - see Media section or FOTO account episode details.          Subjective   History of Present Illness  Georgia is a 59 y.o. female who reports to physical therapy with a chief concern of left sided neck pain.         Diagnostic tests related to this condition: X-ray.   X-Ray Details: FINDINGS:  There is trace anterolisthesis of C6 on C7, C4 on C5 with trace retrolisthesis of C5 on C6. There is multilevel facet joint arthropathy with disc space narrowing most significant at the C5-6 and C6-7 levels. There may also be trace anterolisthesis of C7 on T1. No instability is demonstrated with flexion or extension.     Impression:     As above        Electronically signed by:Mick Tang MD  Date:                                            04/17/2025  Time:                                           09:17    History of Present Condition/Illness: Patient reports insidious onset of left sided neck pain with worse pain in the left sub-occipital area. She has had up and down pain since that time along with 1 visit at the chiropractor and then it got to the point where she was seen in the ER due to pain getting so bad. She has difficulty with turning  head and with looking up/down since this started. He pain is a little better since taking a medrol dose pack and some pain meds from ER. She was referred by pain management . She is doing a little better today at rest if she is not trying to turn her head.     Activities of Daily Living  Social history was obtained from Patient.               Previously independent with activities of daily living? Yes     Currently independent with activities of daily living? Yes          Previously independent with instrumental activities of daily living? Yes     Currently independent with instrumental activities of daily living? Yes              Review of Systems  Patient denies: Bladder Incontinence, Bowel Incontinence, Lower Extremity Neurological Deficits, and Night Sweats/Chills          Past Medical History/Physical Systems Review:   Georgia Gomez  has a past medical history of Allergic rhinitis, Arthritis, Headache(784.0), and Rash.    Georgia Gomez  has a past surgical history that includes Hysterectomy (10/2014) and Colonoscopy (N/A, 07/20/2020).    Georgia has a current medication list which includes the following prescription(s): biotin, bupropion, bupropion, bupropion, estradiol, estradiol 0.05 mg/24 hr td ptsw, multivitamin, oxycodone-acetaminophen, testosterone, mounjaro, and vitamin d, and the following Facility-Administered Medications: betamethasone acetate-betamethasone sodium phosphate and ketorolac.    Review of patient's allergies indicates:  No Known Allergies     Objective   Posture                 Guarded cervical posture    Spinal Mobility  Hypomobile: Cervical       Spinal Muscle Palpation  Right Spinal Muscle Palpation  Abnormal: Cervical/Thoracic          Left Spinal Muscle Palpation  Abnormal: Cervical/Thoracic  Left Cervical/Thoracic Muscle Palpation Observations: Increased tone : left upper trap, cervical paraspinals and sub-occpitals           Subcranial Range of Motion   Active Restricted?  Passive Restricted? Pain   Flexion Yes Yes Yes   Protraction Yes Yes Yes   Retraction Yes Yes Yes     Cervical Range of Motion   Active (deg) Passive (deg) Pain   Flexion 10   Yes   Extension 15   Yes   Right Lateral Flexion 5   Yes   Right Rotation 20   Yes   Left Lateral Flexion 5   Yes   Left Rotation 15   Yes                  Gait Analysis  Base of Support: Normal                   Treatment:  Therapeutic Exercise  TE 1: Gentle cervical ROM seated.  Manual Therapy  MT 1: Discussed the purpose, mechanism, and indications of dry needling with pt. Pt was cleared of all precautions and contraindications, and pt signed consent form for dry needling performance today by a certified dry needling PT. Pt verbalized understanding to all instruction. Dry needling performed to left upper trap, ESM C3-C5, Sub-occiptals  MT 2: Soft tissue Mobilization: left upper trap, ESM sub-occiptals      Time Entry(in minutes):  Manual Therapy Time Entry: 25    Assessment & Plan   Assessment  Georgia presents with a condition of Moderate complexity.   Presentation of Symptoms: Evolving       Functional Limitations: Activity tolerance, Carrying objects, Completing work/school activities, Completing self-care activities, Disrupted sleep pattern, Functional mobility, Pain when reaching, Pain with ADLs/IADLs, Range of motion, Reaching  Impairments: Activity intolerance, Abnormal muscle tone, Lack of appropriate home exercise program  Personal Factors Affecting Prognosis: Pain, Physical limitations, Reading difficulty    Patient Goal for Therapy (PT): Get Rid of neck pain and return to previous activity level  Prognosis: Good  Assessment Details: Patient has significant cervical ROM limitations with pain and increased tone . She has limited sub-occipital mobility due to pain and muscle guarding. No radicular symptoms were reported. She did well with treatment today with decreased pain in neck and left sub-occipital area. Her  cervical  rotation, flexion and side bending were improved and less painful post treatment today. She should respond well to manual therapy, dry needling and progressive exercises to help decrease her pain and return her to previous activity level.     Plan  From a physical therapy perspective, the patient would benefit from: Skilled Rehab Services    Planned therapy interventions include: Therapeutic exercise, Therapeutic activities, Neuromuscular re-education, Manual therapy, ADLs/IADLs, Work hardening, and Work conditioning.    Planned modalities to include: Other (Comment). Dry needling      Visit Frequency: 1 times Per Week for 6 Weeks.       This plan was discussed with Patient.   Discussion participants: Agreed Upon Plan of Care  Plan details: Outpatient Physical Therapy to include the following interventions: Cervical/Lumbar Traction, Electrical Stimulation re-eval, dry needling, Gait Training, Iontophoresis (with ), Manual Therapy, Moist Heat/ Ice, Neuromuscular Re-ed, Patient Education, Self Care, Therapeutic Activities, and Therapeutic Exercise. Physical therapist and physical therapy assistant(s) will met face to face to discuss patient's treatment plan and progress towards established goals. Pt will be seen by a physical therapist minimally every 6th visit or every 30 days. Potential for KX modifier and additional visits based on subjective report, objective examination, nature of current condition, comorbidities, and current functional status.            Patient's spiritual, cultural, and educational needs considered and patient agreeable to plan of care and goals.           Goals:   Active       Long term goals       Decrease pain with ADLs/work to 2/10 or less       Start:  04/24/25    Expected End:  06/05/25            Increase cervical ROM all planes to within 25% or normal       Start:  04/24/25    Expected End:  06/05/25            Able to sleep with 2/10 or less pain       Start:  04/24/25    Expected  End:  06/19/25               Short term goals       Decrease pain with ADLs to 4/10 or less       Start:  04/24/25    Expected End:  05/15/25            Increase cervical rotation to 50% or more : bilateral       Start:  04/24/25    Expected End:  05/15/25            increase cervical flexion by 75% or more       Start:  04/24/25    Expected End:  05/15/25                David Rothman, PT

## 2025-04-28 ENCOUNTER — HOSPITAL ENCOUNTER (OUTPATIENT)
Dept: RADIOLOGY | Facility: HOSPITAL | Age: 60
Discharge: HOME | End: 2025-04-28
Attending: ANESTHESIOLOGY
Payer: COMMERCIAL

## 2025-04-28 DIAGNOSIS — M54.2 CERVICALGIA: ICD-10-CM

## 2025-04-28 PROCEDURE — 72141 MRI NECK SPINE W/O DYE: CPT | Mod: TC,PO

## 2025-04-28 PROCEDURE — 72141 MRI NECK SPINE W/O DYE: CPT | Mod: 26,,, | Performed by: RADIOLOGY

## 2025-04-29 ENCOUNTER — CLINICAL SUPPORT (OUTPATIENT)
Dept: REHABILITATION | Facility: HOSPITAL | Age: 60
End: 2025-04-29
Attending: ANESTHESIOLOGY
Payer: COMMERCIAL

## 2025-04-29 DIAGNOSIS — R29.898 DECREASED ROM OF NECK: ICD-10-CM

## 2025-04-29 DIAGNOSIS — M54.2 NECK PAIN: Primary | ICD-10-CM

## 2025-04-29 PROCEDURE — 97140 MANUAL THERAPY 1/> REGIONS: CPT | Mod: PN

## 2025-04-29 PROCEDURE — 97110 THERAPEUTIC EXERCISES: CPT | Mod: PN

## 2025-04-29 NOTE — PROGRESS NOTES
Outpatient Rehab    Physical Therapy Visit    Patient Name: Georgia Gomez  MRN: [id number]  YOB: 1965  Encounter Date: 4/29/2025    Therapy Diagnosis:   Encounter Diagnoses   Name Primary?    Neck pain Yes    Decreased ROM of neck      Physician: Oz Pizano MD    Physician Orders: Eval and Treat  Medical Diagnosis: Cervicalgia  Myofascial pain    Visit # / Visits Authorized:  1 / 12  Insurance Authorization Period: 4/17/2025 to 12/31/2025  Date of Evaluation: 4/24/2025  Plan of Care Certification: 4/24/2025 to 6/19/2025        Time In: 1600   Time Out: 1710  Total Time: 70            Subjective   Patient reports she can turn her head and look down better with less pain. She is still having pain to left occiptal/sub-occiptal area which is worse when she trys to lift her head when going from laying down to sitting..  Pain reported as 2/10.          Treatment:  Therapeutic Exercise  TE 1: Cervical sidbend isometrics: x 10, 5 sec holds  TE 2: Left shoulder elevation isometrics: x 10, 5 sec holds  TE 3: Gentle: Bilateral upper trap stretch: Bilateral 3 x 30 sec  TE 4: Gentle cervical AAROM: rotation and side bending  TE 5: seated: cervical ext isometrics into her hands: x 5 sec x 5  Manual Therapy  MT 1: Discussed the purpose, mechanism, and indications of dry needling with pt. Pt was cleared of all precautions and contraindications, and pt signed consent form for dry needling performance today by a certified dry needling PT. Pt verbalized understanding to all instruction. Dry needling performed to left upper trap, ESM C3-C5, Sub-occiptals  MT 2: Soft tissue Mobilization: left upper trap, ESM,  sub-occiptals  MT 3: Central PA mobs: III-IV C3-T1  MT 4: Rotational Mobs: III-IV, C3-T1  MT 5: Cervical traction 3 x 45 sec, sub occiptal release      Time Entry(in minutes):  Manual Therapy Time Entry: 40  Therapeutic Exercise Time Entry: 20    Assessment & Plan   Assessment: Patient with some increase in  pain post treatment most likly due to the increase in manual therapy today despite me trying to keep it gentle in nature. Her neck remains very irratable but was doing better . She was encouraged to use some heat tonight and I am hopeful her increased pain is short lived. i will access how she does  between visits and adjust treatment as appopriate next visit.  Evaluation/Treatment Tolerance: Patient limited by pain    Patient will continue to benefit from skilled outpatient physical therapy to address the deficits listed in the problem list box on initial evaluation, provide pt/family education and to maximize pt's level of independence in the home and community environment.     Patient's spiritual, cultural, and educational needs considered and patient agreeable to plan of care and goals.           Plan: Outpatient Physical Therapy to include the following interventions: Cervical/Lumbar Traction, Electrical Stimulation re-eval, dry needling, Gait Training, Iontophoresis (with ), Manual Therapy, Moist Heat/ Ice, Neuromuscular Re-ed, Patient Education, Self Care, Therapeutic Activities, and Therapeutic Exercise. Physical therapist and physical therapy assistant(s) will met face to face to discuss patient's treatment plan and progress towards established goals. Pt will be seen by a physical therapist minimally every 6th visit or every 30 days. Potential for KX modifier and additional visits based on subjective report, objective examination, nature of current condition, comorbidities, and current functional status.    Goals:   Active       Long term goals       Decrease pain with ADLs/work to 2/10 or less (Progressing)       Start:  04/24/25    Expected End:  06/05/25            Increase cervical ROM all planes to within 25% or normal (Progressing)       Start:  04/24/25    Expected End:  06/05/25            Able to sleep with 2/10 or less pain (Progressing)       Start:  04/24/25    Expected End:  06/19/25                Short term goals       Decrease pain with ADLs to 4/10 or less (Progressing)       Start:  04/24/25    Expected End:  05/15/25            Increase cervical rotation to 50% or more : bilateral (Progressing)       Start:  04/24/25    Expected End:  05/15/25            increase cervical flexion by 75% or more (Progressing)       Start:  04/24/25    Expected End:  05/15/25                David Rothman, PT

## 2025-04-30 ENCOUNTER — PATIENT MESSAGE (OUTPATIENT)
Dept: PAIN MEDICINE | Facility: CLINIC | Age: 60
End: 2025-04-30
Payer: COMMERCIAL

## 2025-05-06 ENCOUNTER — CLINICAL SUPPORT (OUTPATIENT)
Dept: REHABILITATION | Facility: HOSPITAL | Age: 60
End: 2025-05-06
Attending: ANESTHESIOLOGY
Payer: COMMERCIAL

## 2025-05-06 DIAGNOSIS — R29.898 DECREASED ROM OF NECK: ICD-10-CM

## 2025-05-06 DIAGNOSIS — M54.2 NECK PAIN: Primary | ICD-10-CM

## 2025-05-06 PROCEDURE — 97110 THERAPEUTIC EXERCISES: CPT | Mod: PN

## 2025-05-06 PROCEDURE — 97140 MANUAL THERAPY 1/> REGIONS: CPT | Mod: PN

## 2025-05-07 NOTE — PROGRESS NOTES
Outpatient Rehab    Physical Therapy Visit    Patient Name: Georgia Gomez  MRN: [id number]  YOB: 1965  Encounter Date: 5/6/2025    Therapy Diagnosis:   Encounter Diagnoses   Name Primary?    Neck pain Yes    Decreased ROM of neck      Physician: Oz Pizano MD    Physician Orders: Eval and Treat  Medical Diagnosis: Cervicalgia  Myofascial pain    Visit # / Visits Authorized:  2 / 12  Insurance Authorization Period: 4/17/2025 to 12/31/2025  Date of Evaluation: 4/24/2025  Plan of Care Certification: 4/24/2025 to 6/19/2025       Time In: 1415   Time Out: 1500  Total Time (in minutes): 45   Total Billable Time (in minutes):               Subjective   Patient reports she was sore after last visit but overall feels her rotation is getting better but still having some quick bouts of pain in mainly the left sub-occiptal area with looking down and up which has improved but still the worse motion for her. She is just having some mild soreness today when she is just sitting there.She has been using a heated scarf which has been helpful.  Pain reported as 3/10.          Treatment:  Therapeutic Exercise  TE 4: Gentle cervical AAROM: rotation and side bending  TE 5: Seated cervical rotation: x 10  TE 6: Seated chin tucks: x 10  Manual Therapy  MT 1: Discussed the purpose, mechanism, and indications of dry needling with pt. Pt was cleared of all precautions and contraindications, and pt signed consent form for dry needling performance today by a certified dry needling PT. Pt verbalized understanding to all instruction. Dry needling performed to  ESM C3-C5, Sub-occiptalsL Left  MT 2: Soft tissue Mobilization: left upper trap, ESM,  sub-occiptals  MT 3: Central PA mobs: III-IV C3-T1  MT 4: Rotational Mobs: III-IV, C3-T1      Time Entry(in minutes):  Manual Therapy Time Entry: 30  Therapeutic Exercise Time Entry: 10    Assessment & Plan   Assessment: Patient continues to have increased pain with flexion/ext  activites espcially at end ranges. She has improved some with overall mobility and pain but has not progressed as quickly as i was hoping for. i increased some of her exercise stuff last visit and that only seemed to acravate her sysmptoms so i did less today and am having her focus on protaction and retraction of the head to work on that sub-occiptal area. She did do better today with less pain post treatment so i am hopeful we will have better results after today.  Evaluation/Treatment Tolerance: Patient tolerated treatment well    Patient will continue to benefit from skilled outpatient physical therapy to address the deficits listed in the problem list box on initial evaluation, provide pt/family education and to maximize pt's level of independence in the home and community environment.     Patient's spiritual, cultural, and educational needs considered and patient agreeable to plan of care and goals.           Plan: Outpatient Physical Therapy to include the following interventions: Cervical/Lumbar Traction, Electrical Stimulation re-eval, dry needling, Gait Training, Iontophoresis (with ), Manual Therapy, Moist Heat/ Ice, Neuromuscular Re-ed, Patient Education, Self Care, Therapeutic Activities, and Therapeutic Exercise. Physical therapist and physical therapy assistant(s) will met face to face to discuss patient's treatment plan and progress towards established goals. Pt will be seen by a physical therapist minimally every 6th visit or every 30 days. Potential for KX modifier and additional visits based on subjective report, objective examination, nature of current condition, comorbidities, and current functional status.    Goals:   Active       Long term goals       Decrease pain with ADLs/work to 2/10 or less (Progressing)       Start:  04/24/25    Expected End:  06/05/25            Increase cervical ROM all planes to within 25% or normal (Progressing)       Start:  04/24/25    Expected End:  06/05/25             Able to sleep with 2/10 or less pain (Progressing)       Start:  04/24/25    Expected End:  06/19/25               Short term goals       Decrease pain with ADLs to 4/10 or less (Progressing)       Start:  04/24/25    Expected End:  05/15/25            Increase cervical rotation to 50% or more : bilateral (Met)       Start:  04/24/25    Expected End:  05/15/25    Resolved:  05/07/25         increase cervical flexion by 75% or more (Progressing)       Start:  04/24/25    Expected End:  05/15/25                David Rothman, PT

## 2025-05-12 ENCOUNTER — OFFICE VISIT (OUTPATIENT)
Dept: FAMILY MEDICINE | Facility: CLINIC | Age: 60
End: 2025-05-12
Payer: COMMERCIAL

## 2025-05-12 VITALS
WEIGHT: 143.75 LBS | OXYGEN SATURATION: 99 % | BODY MASS INDEX: 23.1 KG/M2 | HEIGHT: 66 IN | DIASTOLIC BLOOD PRESSURE: 66 MMHG | HEART RATE: 81 BPM | SYSTOLIC BLOOD PRESSURE: 104 MMHG

## 2025-05-12 DIAGNOSIS — E04.1 THYROID NODULE: ICD-10-CM

## 2025-05-12 DIAGNOSIS — Z00.00 ROUTINE GENERAL MEDICAL EXAMINATION AT A HEALTH CARE FACILITY: Primary | ICD-10-CM

## 2025-05-12 PROCEDURE — 3074F SYST BP LT 130 MM HG: CPT | Mod: CPTII,S$GLB,, | Performed by: FAMILY MEDICINE

## 2025-05-12 PROCEDURE — 1160F RVW MEDS BY RX/DR IN RCRD: CPT | Mod: CPTII,S$GLB,, | Performed by: FAMILY MEDICINE

## 2025-05-12 PROCEDURE — 3044F HG A1C LEVEL LT 7.0%: CPT | Mod: CPTII,S$GLB,, | Performed by: FAMILY MEDICINE

## 2025-05-12 PROCEDURE — 3078F DIAST BP <80 MM HG: CPT | Mod: CPTII,S$GLB,, | Performed by: FAMILY MEDICINE

## 2025-05-12 PROCEDURE — 99999 PR PBB SHADOW E&M-EST. PATIENT-LVL IV: CPT | Mod: PBBFAC,,, | Performed by: FAMILY MEDICINE

## 2025-05-12 PROCEDURE — 1159F MED LIST DOCD IN RCRD: CPT | Mod: CPTII,S$GLB,, | Performed by: FAMILY MEDICINE

## 2025-05-12 PROCEDURE — 99396 PREV VISIT EST AGE 40-64: CPT | Mod: 25,S$GLB,, | Performed by: FAMILY MEDICINE

## 2025-05-12 PROCEDURE — 3008F BODY MASS INDEX DOCD: CPT | Mod: CPTII,S$GLB,, | Performed by: FAMILY MEDICINE

## 2025-05-12 RX ORDER — TIRZEPATIDE 5 MG/.5ML
5 INJECTION, SOLUTION SUBCUTANEOUS
Qty: 2 ML | Refills: 11 | Status: SHIPPED | OUTPATIENT
Start: 2025-05-12

## 2025-05-12 NOTE — PROGRESS NOTES
Chief Complaint:  Chief Complaint   Patient presents with    Annual Exam        HPI:  Georgia is a 60 y.o. year old     History of Present Illness    CHIEF COMPLAINT:  Georgia presents today for annual wellness visit    MUSCULOSKELETAL:  She presented to ER on April 14th with severe neck pain and limited range of motion, specifically inability to look downward, rotate neck, or maintain chin down position. In the ER, she received Oxycodone and muscle relaxer injection, followed by Toradol injection at Dr. Chavez's office.    SLEEP:  She discontinued CPAP use following 32-pound weight loss when snoring resolved. With recent 7-8 pound weight gain, spouse notes return of snoring symptoms. She acknowledges correlation between weight changes and snoring severity.    GYNECOLOGICAL HISTORY:  She has a history of abnormal pap smear with HPV and previous hysterectomy.    CURRENT MEDICATIONS:  She takes Wellbutrin 200mg twice daily, hormone replacement therapy (estrogen patch and testosterone gel), Centrum multivitamin over 50, vitamin D, and biotin for hair and nails.      ROS:  ENT: +runny nose, +nasal discharge  Respiratory: +snoring  Musculoskeletal: +neck pain, +limited movement, +neck stiffness  Neurological: no headache, no dizziness  Female Genitourinary: +vaginal dryness           Review of Systems   Constitutional:  Negative for fatigue and unexpected weight change.   HENT:  Negative for hearing loss and rhinorrhea.    Respiratory:  Negative for cough, chest tightness and wheezing.    Cardiovascular:  Negative for chest pain and palpitations.   Gastrointestinal:  Negative for blood in stool, constipation and diarrhea.   Genitourinary:  Negative for difficulty urinating, dysuria and hematuria.   Musculoskeletal:  Positive for neck pain. Negative for arthralgias and joint swelling.        Walking 2mi daily   Neurological:  Negative for dizziness, weakness and headaches.   Psychiatric/Behavioral:  Negative for  "dysphoric mood.          Past Medical History:  Past Medical History:   Diagnosis Date    Allergic rhinitis     Arthritis     Headache(784.0)     Rash          Vitals:  Vitals:    05/12/25 0829   BP: 104/66   Pulse: 81   SpO2: 99%   Weight: 65.2 kg (143 lb 11.8 oz)   Height: 5' 6" (1.676 m)   PainSc: 0-No pain       BP Readings from Last 5 Encounters:   05/12/25 104/66   04/17/25 (!) 99/55   04/14/25 (!) 140/84   10/18/24 102/76   06/15/24 98/60       The 10-year ASCVD risk score (Heath SANCHEZ, et al., 2019) is: 1.8%    Values used to calculate the score:      Age: 60 years      Sex: Female      Is Non- : No      Diabetic: No      Tobacco smoker: No      Systolic Blood Pressure: 104 mmHg      Is BP treated: No      HDL Cholesterol: 68 mg/dL      Total Cholesterol: 182 mg/dL      Physical Exam:  Physical Exam  Vitals and nursing note reviewed.   Constitutional:       General: She is not in acute distress.     Appearance: Normal appearance. She is well-developed.   HENT:      Head: Normocephalic and atraumatic.      Right Ear: Tympanic membrane and external ear normal.      Left Ear: Tympanic membrane and external ear normal.      Nose: Nose normal.      Mouth/Throat:      Pharynx: No oropharyngeal exudate.   Eyes:      Conjunctiva/sclera: Conjunctivae normal.      Pupils: Pupils are equal, round, and reactive to light.   Neck:      Thyroid: No thyromegaly.   Cardiovascular:      Rate and Rhythm: Normal rate and regular rhythm.   Pulmonary:      Effort: Pulmonary effort is normal. No respiratory distress.      Breath sounds: Normal breath sounds. No wheezing.   Abdominal:      General: Bowel sounds are normal. There is no distension.      Palpations: Abdomen is soft. There is no mass.      Tenderness: There is no abdominal tenderness. There is no guarding or rebound.   Musculoskeletal:      Cervical back: Neck supple.      Right lower leg: No edema.      Left lower leg: No edema. "   Lymphadenopathy:      Cervical: No cervical adenopathy.   Skin:     General: Skin is warm and dry.   Neurological:      General: No focal deficit present.      Mental Status: She is alert and oriented to person, place, and time.      Cranial Nerves: No cranial nerve deficit.   Psychiatric:         Mood and Affect: Mood normal.         Behavior: Behavior normal.             Assessment & Plan:  Routine general medical examination at a health care facility  Comments:  anticipatory guidance reviewed  Orders:  -     CBC Without Differential; Future; Expected date: 05/12/2025  -     Comprehensive Metabolic Panel; Future; Expected date: 05/12/2025  -     Lipid Panel; Future; Expected date: 05/12/2025  -     Hemoglobin A1C; Future; Expected date: 05/12/2025  -     TSH; Future; Expected date: 05/12/2025    Thyroid nodule  -     Cancel: US Soft Tissue Head Neck; Future; Expected date: 05/12/2025  -     US Thyroid; Future; Expected date: 05/12/2025    Other orders  -     tirzepatide, weight loss, (ZEPBOUND) 5 mg/0.5 mL Soln; Inject 0.5 mLs (5 mg total) into the skin every 7 days.  Dispense: 2 mL; Refill: 11         Assessment & Plan    ORDERS:   Ordered fasting labs.   Ordered thyroid US for spring.   Ordered DEXA scan.   Ordered mammogram.    IMPRESSION:  Evaluated neck pain resulting in ER visit on 4/14. MRI revealed pinched nerve. Receiving dry needling treatment with good progress.  Assessed thyroid nodules noted on MRI. Previous US from last year to be reviewed.  Considered request for weight loss medication (Zepbound) and discussed self-pay option due to insurance coverage issues.  Evaluated oral lesion. Advised monitoring for 6-8 weeks before considering ENT referral if persistent. Provided information on tonsilloliths (tonsil stones) as possible explanation.  Reviewed hormone replacement therapy (estrogen patch and testosterone gel).  Discussed correlation between weight gain and return of snoring/sleep apnea  symptoms.    PLAN SUMMARY:   Started Intrarosa vaginal insert capsules for vaginal health   Ordered DEXA scan   Ordered thyroid ultrasound for spring   Started Zepbound 5 mg/0.5 mL vial for weight management (2.5 mg subcutaneously)   Ordered mammogram   Ordered fasting labs   Continue daily 2-mile walks with spouse   Consider alternative arm exercises while lying down   Contact office if oral lesion persists after 6-8 weeks   Follow up in 1 year for annual check-up    PATIENT EDUCATION:   Explained importance of vaginal estrogen for maintaining healthy vaginal lining and preventing complications.    ACTION ITEMS/LIFESTYLE:   Georgia to continue daily 2-mile walks with spouse.   Consider alternative arm exercises while lying down to accommodate neck issues.    MEDICATIONS:   Started Zepbound 5 mg/0.5 mL vial for weight management, to be administered as 0.25 mL (2.5 mg) dose subcutaneously.   Started Intrarosa vaginal insert capsules for vaginal health.    FOLLOW UP:   Follow up for fasting labs at patient's convenience.   Contact the office if oral lesion persists after 6-8 weeks.   Follow up in 1 year for annual check-up.       This note was generated with the assistance of ambient listening technology. Verbal consent was obtained by the patient and accompanying visitor(s) for the recording of patient appointment to facilitate this note. I attest to having reviewed and edited the generated note for accuracy, though some syntax or spelling errors may persist. Please contact the author of this note for any clarification.

## 2025-05-13 ENCOUNTER — HOSPITAL ENCOUNTER (OUTPATIENT)
Dept: RADIOLOGY | Facility: HOSPITAL | Age: 60
Discharge: HOME | End: 2025-05-13
Attending: FAMILY MEDICINE
Payer: COMMERCIAL

## 2025-05-13 ENCOUNTER — RESULTS FOLLOW-UP (OUTPATIENT)
Dept: FAMILY MEDICINE | Facility: CLINIC | Age: 60
End: 2025-05-13

## 2025-05-13 DIAGNOSIS — E04.1 THYROID NODULE: ICD-10-CM

## 2025-05-13 PROCEDURE — 76536 US EXAM OF HEAD AND NECK: CPT | Mod: TC,PO

## 2025-05-13 PROCEDURE — 76536 US EXAM OF HEAD AND NECK: CPT | Mod: 26,,, | Performed by: RADIOLOGY

## 2025-05-16 ENCOUNTER — CLINICAL SUPPORT (OUTPATIENT)
Dept: REHABILITATION | Facility: HOSPITAL | Age: 60
End: 2025-05-16
Payer: COMMERCIAL

## 2025-05-16 DIAGNOSIS — M54.2 NECK PAIN: Primary | ICD-10-CM

## 2025-05-16 DIAGNOSIS — R29.898 DECREASED ROM OF NECK: ICD-10-CM

## 2025-05-16 PROCEDURE — 97110 THERAPEUTIC EXERCISES: CPT | Mod: PN

## 2025-05-16 PROCEDURE — 97140 MANUAL THERAPY 1/> REGIONS: CPT | Mod: PN

## 2025-05-19 NOTE — PROGRESS NOTES
Outpatient   Outpatient Rehab    Physical Therapy Progress Note    Patient Name: Georgia Gomez  MRN: [id number]  YOB: 1965  Encounter Date: 5/16/2025    Therapy Diagnosis:   Encounter Diagnoses   Name Primary?    Neck pain Yes    Decreased ROM of neck      Physician: Oz Pizano MD    Physician Orders: Eval and Treat  Medical Diagnosis: Cervicalgia  Myofascial pain    Visit # / Visits Authorized:  3 / 12  Insurance Authorization Period: 4/17/2025 to 12/31/2025  Date of Evaluation: 4/24/2025  Plan of Care Certification: 4/24/2025 to 6/19/2025      PT/PTA:     Number of PTA visits since last PT visit:   Time In: 0803   Time Out: 0905  Total Time (in minutes): 62   Total Billable Time (in minutes):      FOTO:  Intake Score: 56%  Survey Score 2: 62%  Survey Score 3:  %        Subjective   Patient reports she has been feeling better since her last visit and has had a big change in pain overall. She is still having some episodes of quick intense pain with certain head movements but most of the time is not having much pain. She feels aboiut 75% better. She is sleeping better and no longer has to hold her head to get up..  Pain reported as 1/10.      Objective            Treatment:  Therapeutic Exercise  TE 1: seated cervical rotation x 10  TE 2: seated chin tucks x 10  TE 3: Gentle bilateral upper trap stretches : 4 x 30 sec each  TE 4: Gentle cervical AAROM: rotation and side bending  TE 5: Seated cervical rotation: x 10  TE 6: Seated chin tucks: x 10  Manual Therapy  MT 1: Discussed the purpose, mechanism, and indications of dry needling with pt. Pt was cleared of all precautions and contraindications, and pt signed consent form for dry needling performance today by a certified dry needling PT. Pt verbalized understanding to all instruction. Dry needling performed to  left upper trap, Sub-occiptalsL Left with electrical stimulation  MT 2: Soft tissue Mobilization: left upper trap, ESM,   sub-occiptals  MT 3: Central PA mobs: III-IV C3-T1  MT 4: Rotational Mobs: III-IV, C3-T1      Time Entry(in minutes):  Manual Therapy Time Entry: 35  Therapeutic Exercise Time Entry: 20    Assessment & Plan   Assessment: Patient has finally had a notable change in pain and function since last visit . She still has some end range restricted motion and pain with combination sub cranial motion but I am encouraged by her progress since last visit.  Evaluation/Treatment Tolerance: Patient tolerated treatment well    Patient will continue to benefit from skilled outpatient physical therapy to address the deficits listed in the problem list box on initial evaluation, provide pt/family education and to maximize pt's level of independence in the home and community environment.     Patient's spiritual, cultural, and educational needs considered and patient agreeable to plan of care and goals.           Plan: Outpatient Physical Therapy to include the following interventions: Cervical/Lumbar Traction, Electrical Stimulation re-eval, dry needling, Gait Training, Iontophoresis (with ), Manual Therapy, Moist Heat/ Ice, Neuromuscular Re-ed, Patient Education, Self Care, Therapeutic Activities, and Therapeutic Exercise. Physical therapist and physical therapy assistant(s) will met face to face to discuss patient's treatment plan and progress towards established goals. Pt will be seen by a physical therapist minimally every 6th visit or every 30 days. Potential for KX modifier and additional visits based on subjective report, objective examination, nature of current condition, comorbidities, and current functional status.    Goals:   Active       Long term goals       Decrease pain with ADLs/work to 2/10 or less (Progressing)       Start:  04/24/25    Expected End:  06/05/25            Increase cervical ROM all planes to within 25% or normal (Progressing)       Start:  04/24/25    Expected End:  06/05/25            Able to sleep  with 2/10 or less pain (Progressing)       Start:  04/24/25    Expected End:  06/19/25              Resolved       Short term goals       Decrease pain with ADLs to 4/10 or less (Met)       Start:  04/24/25    Expected End:  05/15/25    Resolved:  05/19/25         Increase cervical rotation to 50% or more : bilateral (Met)       Start:  04/24/25    Expected End:  05/15/25    Resolved:  05/07/25         increase cervical flexion by 75% or more (Met)       Start:  04/24/25    Expected End:  05/15/25    Resolved:  05/19/25             David Rothman, PT    Physical Therapy Visit    Patient Name: Georgia Gomez  MRN: [id number]  YOB: 1965  Encounter Date: 5/16/2025    Therapy Diagnosis:   Encounter Diagnoses   Name Primary?    Neck pain Yes    Decreased ROM of neck      Physician: Oz Pizano MD    Physician Orders: Eval and Treat  Medical Diagnosis: Cervicalgia  Myofascial pain    Visit # / Visits Authorized:  3 / 12  Insurance Authorization Period: 4/17/2025 to 12/31/2025  Date of Evaluation: 4/24/2025  Plan of Care Certification: 4/24/2025 to 6/19/2025      PT/PTA:     Number of PTA visits since last PT visit:   Time In: 0803   Time Out: 0905  Total Time (in minutes): 62   Total Billable Time (in minutes):      FOTO:  Intake Score: 56%  Survey Score 2: 62%  Survey Score 3:  %    Precautions:       Subjective   Patient reports she has been feeling better since her last visit and has had a big change in pain overall. She is still having some episodes of quick intense pain with certain head movements but most of the time is not having much pain. She feels aboiut 75% better. She is sleeping better and no longer has to hold her head to get up..  Pain reported as 1/10.      Objective            Treatment:  Therapeutic Exercise  TE 1: seated cervical rotation x 10  TE 2: seated chin tucks x 10  TE 3: Gentle bilateral upper trap stretches : 4 x 30 sec each  TE 4: Gentle cervical AAROM: rotation and side  bending  TE 5: Seated cervical rotation: x 10  TE 6: Seated chin tucks: x 10  Manual Therapy  MT 1: Discussed the purpose, mechanism, and indications of dry needling with pt. Pt was cleared of all precautions and contraindications, and pt signed consent form for dry needling performance today by a certified dry needling PT. Pt verbalized understanding to all instruction. Dry needling performed to  left upper trap, Sub-occiptalsL Left with electrical stimulation  MT 2: Soft tissue Mobilization: left upper trap, ESM,  sub-occiptals  MT 3: Central PA mobs: III-IV C3-T1  MT 4: Rotational Mobs: III-IV, C3-T1      Time Entry(in minutes):  Manual Therapy Time Entry: 35  Therapeutic Exercise Time Entry: 20    Assessment & Plan   Assessment: Patient has finally had a notable change in pain and function since last visit . She still has some end range restricted motion and pain with combination sub cranial motion but I am encouraged by her progress since last visit.  Evaluation/Treatment Tolerance: Patient tolerated treatment well    Patient will continue to benefit from skilled outpatient physical therapy to address the deficits listed in the problem list box on initial evaluation, provide pt/family education and to maximize pt's level of independence in the home and community environment.     Patient's spiritual, cultural, and educational needs considered and patient agreeable to plan of care and goals.           Plan: Outpatient Physical Therapy to include the following interventions: Cervical/Lumbar Traction, Electrical Stimulation re-eval, dry needling, Gait Training, Iontophoresis (with ), Manual Therapy, Moist Heat/ Ice, Neuromuscular Re-ed, Patient Education, Self Care, Therapeutic Activities, and Therapeutic Exercise. Physical therapist and physical therapy assistant(s) will met face to face to discuss patient's treatment plan and progress towards established goals. Pt will be seen by a physical therapist minimally  every 6th visit or every 30 days. Potential for KX modifier and additional visits based on subjective report, objective examination, nature of current condition, comorbidities, and current functional status.    Goals:   Active       Long term goals       Decrease pain with ADLs/work to 2/10 or less (Progressing)       Start:  04/24/25    Expected End:  06/05/25            Increase cervical ROM all planes to within 25% or normal (Progressing)       Start:  04/24/25    Expected End:  06/05/25            Able to sleep with 2/10 or less pain (Progressing)       Start:  04/24/25    Expected End:  06/19/25              Resolved       Short term goals       Decrease pain with ADLs to 4/10 or less (Met)       Start:  04/24/25    Expected End:  05/15/25    Resolved:  05/19/25         Increase cervical rotation to 50% or more : bilateral (Met)       Start:  04/24/25    Expected End:  05/15/25    Resolved:  05/07/25         increase cervical flexion by 75% or more (Met)       Start:  04/24/25    Expected End:  05/15/25    Resolved:  05/19/25             David Rothman, PT

## 2025-05-28 ENCOUNTER — CLINICAL SUPPORT (OUTPATIENT)
Dept: REHABILITATION | Facility: HOSPITAL | Age: 60
End: 2025-05-28
Attending: ANESTHESIOLOGY
Payer: COMMERCIAL

## 2025-05-28 DIAGNOSIS — M54.2 NECK PAIN: Primary | ICD-10-CM

## 2025-05-28 DIAGNOSIS — R29.898 DECREASED ROM OF NECK: ICD-10-CM

## 2025-05-28 PROCEDURE — 97530 THERAPEUTIC ACTIVITIES: CPT | Mod: PN

## 2025-05-29 ENCOUNTER — OFFICE VISIT (OUTPATIENT)
Dept: PAIN MEDICINE | Facility: CLINIC | Age: 60
End: 2025-05-29
Payer: COMMERCIAL

## 2025-05-29 VITALS
HEIGHT: 66 IN | HEART RATE: 81 BPM | SYSTOLIC BLOOD PRESSURE: 97 MMHG | WEIGHT: 141.88 LBS | BODY MASS INDEX: 22.8 KG/M2 | DIASTOLIC BLOOD PRESSURE: 63 MMHG

## 2025-05-29 DIAGNOSIS — M47.816 LUMBAR SPONDYLOSIS: Primary | ICD-10-CM

## 2025-05-29 DIAGNOSIS — M54.2 CERVICALGIA: ICD-10-CM

## 2025-05-29 DIAGNOSIS — M79.18 MYOFASCIAL PAIN: ICD-10-CM

## 2025-05-29 PROCEDURE — 99999 PR PBB SHADOW E&M-EST. PATIENT-LVL IV: CPT | Mod: PBBFAC,,, | Performed by: ANESTHESIOLOGY

## 2025-05-29 PROCEDURE — 3078F DIAST BP <80 MM HG: CPT | Mod: CPTII,S$GLB,, | Performed by: ANESTHESIOLOGY

## 2025-05-29 PROCEDURE — 3008F BODY MASS INDEX DOCD: CPT | Mod: CPTII,S$GLB,, | Performed by: ANESTHESIOLOGY

## 2025-05-29 PROCEDURE — 1159F MED LIST DOCD IN RCRD: CPT | Mod: CPTII,S$GLB,, | Performed by: ANESTHESIOLOGY

## 2025-05-29 PROCEDURE — 1160F RVW MEDS BY RX/DR IN RCRD: CPT | Mod: CPTII,S$GLB,, | Performed by: ANESTHESIOLOGY

## 2025-05-29 PROCEDURE — 3074F SYST BP LT 130 MM HG: CPT | Mod: CPTII,S$GLB,, | Performed by: ANESTHESIOLOGY

## 2025-05-29 PROCEDURE — 99214 OFFICE O/P EST MOD 30 MIN: CPT | Mod: S$GLB,,, | Performed by: ANESTHESIOLOGY

## 2025-05-29 PROCEDURE — 3044F HG A1C LEVEL LT 7.0%: CPT | Mod: CPTII,S$GLB,, | Performed by: ANESTHESIOLOGY

## 2025-05-29 NOTE — PROGRESS NOTES
Outpatient Rehab    Physical Therapy Discharge    Patient Name: Georgia Gomez  MRN: [id number]  YOB: 1965  Encounter Date: 5/28/2025    Therapy Diagnosis:   Encounter Diagnoses   Name Primary?    Neck pain Yes    Decreased ROM of neck      Physician: Oz Pizano MD    Physician Orders: Eval and Treat  Medical Diagnosis: Cervicalgia  Myofascial pain    Visit # / Visits Authorized:  4 / 12  Insurance Authorization Period: 4/17/2025 to 5/28/2025  Date of Evaluation: 4/24/2025  Plan of Care Certification: 4/24/2025 to 6/19/2025        Time In: 1700   Time Out: 1735  Total Time (in minutes): 35   Total Billable Time (in minutes):  25    FOTO:  Intake Score: 28%  Survey Score 2: 20%      Subjective   Patient reports she has been feeling good. She thinks today has been her best day. she is sleeping better and does still have some pain and tightness but signficantly better at this point. She has been doing her exercises during the day. She feels good about where she is right now. Her worse pain is if she pushes on the left subocciptal area but besides that she feel pretty good and is able to turn her head without moving her whole body..  Pain reported as 0/10.      Objective      Subcranial Range of Motion   Active Restricted? Passive Restricted? Pain   Flexion         Protraction         Retraction           Cervical Range of Motion   Active (deg) Passive (deg) Pain   Flexion 80       Extension 40       Right Lateral Flexion 25       Right Rotation 75       Left Lateral Flexion 20       Left Rotation 70                      Treatment:  Therapeutic Activity  TA 1: Discussed /demo'd and reviewed HEP and functional activites that are still important for her to continue to imprve her functional ROM with minimal pain x 25 min    Time Entry(in minutes):  Therapeutic Activity Time Entry: 25    Assessment & Plan   Assessment: Patient is doing a lot better and feels she is at a place she can mange on her own  and continue to improve. She is having minimal pain most of the time now with some episodes of a quick bump in pain. She is independent with her HEP and has a good understanding of what she can do at home and during the day to continue to improve. She will continue on a HEP and follow up if she feels a need and she doesn't continue to improve. She will see pain mangement today.  Evaluation/Treatment Tolerance: Patient tolerated treatment well    The patient's spiritual, cultural, and educational needs were considered, and the patient is agreeable to the plan of care and goals.           Plan: Discharged to HEP, follow up as needed.    Goals:   Resolved       Long term goals       Decrease pain with ADLs/work to 2/10 or less (Met)       Start:  04/24/25    Expected End:  06/05/25    Resolved:  05/29/25         Increase cervical ROM all planes to within 25% or normal (Met)       Start:  04/24/25    Expected End:  06/05/25    Resolved:  05/29/25         Able to sleep with 2/10 or less pain (Met)       Start:  04/24/25    Expected End:  06/19/25    Resolved:  05/29/25            Short term goals       Decrease pain with ADLs to 4/10 or less (Met)       Start:  04/24/25    Expected End:  05/15/25    Resolved:  05/19/25         Increase cervical rotation to 50% or more : bilateral (Met)       Start:  04/24/25    Expected End:  05/15/25    Resolved:  05/07/25         increase cervical flexion by 75% or more (Met)       Start:  04/24/25    Expected End:  05/15/25    Resolved:  05/19/25             David Rothman, PT

## 2025-05-29 NOTE — PROGRESS NOTES
[facility] Follow Up Evaluation      Referred by: No ref. provider found    PCP:     CC:   Chief Complaint   Patient presents with    Neck Pain     Discuss MRI, PT          5/29/2025     3:48 PM 4/17/2025     8:05 AM 6/24/2020     8:38 AM   Last 3 PDI Scores   Pain Disability Index (PDI) 9 14 0       Interval HPI 5/29/25: Ms. Gomez presents to the office for follow up.  Today she reports a significant improvement in her neck pain since I last saw her.  Today her primary complaint is some right-sided axial lower back pain that can radiate down towards the right buttock and hip.  Overall she is doing very well    HPI:   Georgia Gomez is a 60 y.o. female patient who has a past medical history of Allergic rhinitis, Arthritis, Headache(784.0), and Rash. She presents with neck pain.  She noticed her pain started on February 2, 2025.  Since that time she has been having gradually worsening pain.  Today she reports her pain is intermittent on the left side of her neck that can radiate down into the left shoulder.  When the pain comes it is tight, deep, sharp.  The pain can be worse with bending, coughing, sneezing and she finds some relief with ice.      Pain Intervention History:      Past Spine Surgical History:      Past and current medications:  Antineuropathics:  NSAIDs:  Physical therapy: chiropractic care  Antidepressants:  Muscle relaxers: flexeril   Opioids: oxycodone   Antiplatelets/Anticoagulants:    History:  Current Medications[1]    Past Medical History:   Diagnosis Date    Allergic rhinitis     Arthritis     Headache(784.0)     Rash        Past Surgical History:   Procedure Laterality Date    COLONOSCOPY N/A 07/20/2020    Procedure: COLONOSCOPY;  Surgeon: Lalo Jimenez Jr., MD;  Location: [facility];  Service: Endoscopy;  Laterality: N/A;    HYSTERECTOMY N/A 10/2014    uterine fibroids, Total    OOPHORECTOMY  10/2014    total hyst.       Family History   Problem Relation Name Age of Onset     Diabetes Mother      Hypertension Mother      Heart disease Father      Hyperlipidemia Father      Hypertension Father      Lung cancer Father      Cancer Sister          Non Hodgkin's lymphoma    No Known Problems Daughter      No Known Problems Son      Glaucoma Neg Hx      Retinal detachment Neg Hx      Macular degeneration Neg Hx         Social History     Socioeconomic History    Marital status:    Tobacco Use    Smoking status: Never    Smokeless tobacco: Never   Substance and Sexual Activity    Alcohol use: Yes     Comment: occasional glass of wine    Drug use: No    Sexual activity: Yes     Partners: Male     Birth control/protection: See Surgical Hx   Social History Narrative    She currently works as a .      Social Drivers of Health     Financial Resource Strain: Low Risk  (3/24/2025)    Overall Financial Resource Strain (CARDIA)     Difficulty of Paying Living Expenses: Not hard at all   Food Insecurity: No Food Insecurity (3/24/2025)    Hunger Vital Sign     Worried About Running Out of Food in the Last Year: Never true     Ran Out of Food in the Last Year: Never true   Transportation Needs: No Transportation Needs (3/24/2025)    PRAPARE - Transportation     Lack of Transportation (Medical): No     Lack of Transportation (Non-Medical): No   Physical Activity: Sufficiently Active (3/24/2025)    Exercise Vital Sign     Days of Exercise per Week: 6 days     Minutes of Exercise per Session: 40 min   Stress: No Stress Concern Present (3/24/2025)    [employer] [place] [employer] - Occupational Stress Questionnaire     Feeling of Stress : Not at all   Housing Stability: Low Risk  (3/24/2025)    Housing Stability Vital Sign     Unable to Pay for Housing in the Last Year: No     Number of Times Moved in the Last Year: 0     Homeless in the Last Year: No       Review of patient's allergies indicates:  No Known Allergies    Review of Systems:  12 point review of systems is  "negative.    Physical Exam:  Vitals:    05/29/25 1552   BP: 97/63   Pulse: 81   Weight: 64.4 kg (141 lb 13.9 oz)   Height: 5' 6" (1.676 m)   PainSc: 0-No pain   PainLoc: Neck     Body mass index is 22.9 kg/m².    Gen: NAD  Psych: mood appropriate for given condition  HEENT: eyes anicteric   CV: RRR  HEENT: anicteric   Respiratory: non-labored, no signs of respiratory distress  Abd: non-distended  Skin: warm, dry and intact.  Gait: No antalgic gait.     Mild pain with cervical axial facet loading on the left  Tenderness to palpation with trigger points over the left trapezius    Sensory:  Intact and symmetrical to light touch in C4-T1 dermatomes bilaterally.     Motor:    Right Left   C4 Shoulder Abduction  5  5   C5 Elbow Flexion    5  5   C6 Wrist Extension  5  5   C7 Elbow Extension   5  5   C8/T1 Hand Intrinsics   5  5      Right Left   Triceps DTR 2+ 2+   Biceps DTR 2+ 2+        Patellar DTR 2+ 2+        Harper positive  Absent                 Labs:  Lab Results   Component Value Date    HGBA1C 5.1 05/13/2025       Lab Results   Component Value Date    WBC 5.11 05/13/2025    HGB 13.3 05/13/2025    HCT 40.5 05/13/2025    MCV 90 05/13/2025     05/13/2025           Imaging:  Xray cervical spine 4/14/25  FINDINGS:  New minimal retrolisthesis is at C5-6 with mild C5-6 disc space narrowing.  Multilevel facet hypertrophy is present.  No fracture.    Xray cervical spine 4/17/25  FINDINGS:  There is trace anterolisthesis of C6 on C7, C4 on C5 with trace retrolisthesis of C5 on C6. There is multilevel facet joint arthropathy with disc space narrowing most significant at the C5-6 and C6-7 levels. There may also be trace anterolisthesis of C7 on T1. No instability is demonstrated with flexion or extension.    MRI cervical spine 4/28/25  FINDINGS:  Vertebral column: There is multilevel degenerative change.  The vertebral bodies maintain normal height.  There is no fracture.  There is 2 mm anterolisthesis of C4 on C5, " C6 on C7 with subtle trace anterolisthesis of C7 on T1.  There is marked disc space narrowing at the C5-6 level, moderate disc space narrowing at the C6-7 level.  The discs are desiccated.  Baseline marrow signal is normal.  The odontoid process is intact.     Spinal canal, cord, epidural space: The spinal canal is developmentally normal.  There is subtle flattening of the ventral cord surface at the C4-5 level where there is a disc protrusion/osteophyte complex.  Cord signal is grossly normal without changes of edema or myelomalacia.  The axial images are motion degraded.  There is no abnormal epidural mass or fluid collection.     Findings by level:  C2-3: There is left facet joint arthropathy contributing to mild left foraminal stenosis.  There is no spinal stenosis.  C3-4: There is left facet joint arthropathy with mild left uncovertebral spurring.  There is mild-to-moderate left foraminal stenosis without spinal stenosis.  C4-5: There is 2 mm anterolisthesis of C4 on C5.  There is right greater than left facet joint arthropathy.  There is a broad central disc protrusion/osteophyte complex which narrows the subarachnoid space and results in subtle flattening of the ventral cord surface.  There is mild-to-moderate spinal stenosis with mild-to-moderate right foraminal stenosis.  Cord signal is grossly normal without edema or myelomalacia.  C5-6: There is marked disc space narrowing.  There is left greater than right facet joint arthropathy with bilateral uncovertebral spurring as well as a broad disc osteophyte complex which narrows the subarachnoid space and contributes to mild-to-moderate spinal stenosis without cord compression.  There is moderate to severe left greater than right foraminal stenosis.  C6-7: There is moderate disc space narrowing, 2 mm anterolisthesis of C6 on C7.  There is unroofing of a broad right paracentral disc protrusion with subtle annular fissure.  There is very mild uncovertebral  spurring and left greater than right facet joint arthropathy.  There is borderline to mild spinal stenosis without cord compression.  There is at least mild bilateral foraminal stenosis.  C7-T1: There is subtle trace anterolisthesis of C7 on T1.  There is mild bilateral facet joint arthropathy.  There is no spinal canal or significant foraminal stenosis.     Soft tissues, other: There are multiple thyroid nodules most apparent in the left lobe of the gland.  These are better demonstrated on thyroid ultrasound dated 05/27/2024.  The airway is patent.  The prevertebral soft tissues appear normal.    Diagnosis:  1. Lumbar spondylosis  -     Ambulatory Referral/Consult to Physical Therapy    2. Cervicalgia    3. Myofascial pain            Georgia Gomez is a 60 y.o. female patient who has a past medical history of Allergic rhinitis, Arthritis, Headache(784.0), and Rash. She presents with neck pain.  She noticed her pain started on February 2, 2025.  Since that time she has been having gradually worsening pain.  Today she reports her pain is intermittent on the left side of her neck that can radiate down into the left shoulder.  When the pain comes it is tight, deep, sharp.  The pain can be worse with bending, coughing, sneezing and she finds some relief with ice.    5/30/25 - Ms. Gomez presents to the office for follow up.  Today she reports a significant improvement in her neck pain since I last saw her.  Today her primary complaint is some right-sided axial lower back pain that can radiate down towards the right buttock and hip.  Overall she is doing very well    - on exam today she has reproducible pain with right-sided lumbar axial facet loading  - I independently reviewed her cervical MRI with her.  She has a central disc protrusion at C4-5 with mild-to-moderate central canal narrowing.  She also has disc space narrowing at C5-6 and C6-7.  There is a broad-based right paracentral disc bulge with subtle annular  fissure at C6-7.  There is no cord compression or signal change  - she has had an excellent response to PT and dry needling with her neck and shoulder pain.  I think her right-sided lower back pain today is secondary to lumbar spondylosis.  I have recommended she continue to maximize conservative treatment and I have placed a referral for PT for her to work on her lower back.  She can follow up with me on an as needed basis in his she continues to have right-sided axial lower back pain in the future we can consider diagnostic lumbar medial branch blocks      : Not applicable    Oz Pizano M.D.  Interventional Pain Medicine / Anesthesiology    This note was completed with dictation software and grammatical errors may exist.         [1]   Current Outpatient Medications:     biotin 5,000 mcg TbDL, Take 1 tablet by mouth once daily. , Disp: , Rfl:     buPROPion (WELLBUTRIN SR) 200 MG SR12, TAKE 1 TABLET BY MOUTH TWICE  DAILY, Disp: 180 tablet, Rfl: 3    estradioL (LYLLANA) 0.0375 mg/24 hr, , Disp: , Rfl:     estradiol 0.05 mg/24 hr td ptsw (VIVELLE-DOT) 0.05 mg/24 hr, Place 1 patch onto the skin twice a week. , Disp: , Rfl:     estradioL 4 mcg Inst, Place 1 Insert vaginally twice a week., Disp: , Rfl:     multivitamin (THERAGRAN) per tablet, Take 1 tablet by mouth once daily., Disp: , Rfl:     testosterone (ANDROGEL) 1 % (50 mg/5 gram) GlPk, Apply 5 g topically once daily., Disp: , Rfl:     tirzepatide, weight loss, (ZEPBOUND) 5 mg/0.5 mL Soln, Inject 0.5 mLs (5 mg total) into the skin every 7 days., Disp: 2 mL, Rfl: 11    vitamin D 1000 units Tab, Take 1,000 Units by mouth once daily., Disp: , Rfl:     Current Facility-Administered Medications:     ketorolac injection 30 mg, 30 mg, Intramuscular, 1 time in Clinic/HOD,

## 2025-06-18 ENCOUNTER — TELEPHONE (OUTPATIENT)
Dept: FAMILY MEDICINE | Facility: CLINIC | Age: 60
End: 2025-06-18
Payer: COMMERCIAL

## 2025-06-18 RX ORDER — NIRMATRELVIR AND RITONAVIR 300-100 MG
KIT ORAL
Qty: 30 TABLET | Refills: 0 | Status: SHIPPED | OUTPATIENT
Start: 2025-06-18 | End: 2025-06-23

## 2025-06-18 NOTE — TELEPHONE ENCOUNTER
Copied from CRM #[id number]. Topic: General Inquiry - Patient Advice  >> Jun 18, 2025  8:42 AM Priyanka wrote:  Type:  Needs Medical Advice    Who Called: pt   Symptoms (please be specific): tested postive for covid    How long has patient had these symptoms:  1 day   Pharmacy name and phone #:    [id number] [place], [place] [facility] [address] [place]  [address] [address]  Phone: [phone] Fax: [phone]    Would the patient rather a call back or a response via [facility]? Called back   Best Call Back Number: [phone]   Additional Information:

## 2025-06-18 NOTE — TELEPHONE ENCOUNTER
Spoke w/ pt. She tested pos for COVID this morning. Her  tested pos yesterday and was rx'd Paxlovid by Dr Quintana. She started w/ ST, body aches, stuffy/runny nose, HA(Tylenol helped the HA). She would like to know if you would send in rx for Paxlovid.

## 2025-07-08 ENCOUNTER — OFFICE VISIT (OUTPATIENT)
Dept: FAMILY MEDICINE | Facility: CLINIC | Age: 60
End: 2025-07-08
Payer: COMMERCIAL

## 2025-07-08 VITALS
DIASTOLIC BLOOD PRESSURE: 66 MMHG | HEART RATE: 90 BPM | SYSTOLIC BLOOD PRESSURE: 108 MMHG | OXYGEN SATURATION: 97 % | WEIGHT: 133.19 LBS | BODY MASS INDEX: 21.4 KG/M2 | HEIGHT: 66 IN | TEMPERATURE: 98 F

## 2025-07-08 DIAGNOSIS — J01.90 ACUTE BACTERIAL SINUSITIS: Primary | ICD-10-CM

## 2025-07-08 DIAGNOSIS — J22 LOWER RESPIRATORY INFECTION: ICD-10-CM

## 2025-07-08 DIAGNOSIS — B96.89 ACUTE BACTERIAL SINUSITIS: Primary | ICD-10-CM

## 2025-07-08 PROCEDURE — 3044F HG A1C LEVEL LT 7.0%: CPT | Mod: CPTII,S$GLB,, | Performed by: INTERNAL MEDICINE

## 2025-07-08 PROCEDURE — 3008F BODY MASS INDEX DOCD: CPT | Mod: CPTII,S$GLB,, | Performed by: INTERNAL MEDICINE

## 2025-07-08 PROCEDURE — 99214 OFFICE O/P EST MOD 30 MIN: CPT | Mod: S$GLB,,, | Performed by: INTERNAL MEDICINE

## 2025-07-08 PROCEDURE — 3074F SYST BP LT 130 MM HG: CPT | Mod: CPTII,S$GLB,, | Performed by: INTERNAL MEDICINE

## 2025-07-08 PROCEDURE — 1160F RVW MEDS BY RX/DR IN RCRD: CPT | Mod: CPTII,S$GLB,, | Performed by: INTERNAL MEDICINE

## 2025-07-08 PROCEDURE — 3078F DIAST BP <80 MM HG: CPT | Mod: CPTII,S$GLB,, | Performed by: INTERNAL MEDICINE

## 2025-07-08 PROCEDURE — 99999 PR PBB SHADOW E&M-EST. PATIENT-LVL IV: CPT | Mod: PBBFAC,,, | Performed by: INTERNAL MEDICINE

## 2025-07-08 PROCEDURE — 1159F MED LIST DOCD IN RCRD: CPT | Mod: CPTII,S$GLB,, | Performed by: INTERNAL MEDICINE

## 2025-07-08 RX ORDER — CEFUROXIME AXETIL 500 MG/1
500 TABLET ORAL 2 TIMES DAILY
Qty: 20 TABLET | Refills: 0 | Status: SHIPPED | OUTPATIENT
Start: 2025-07-08 | End: 2025-07-18

## 2025-07-08 RX ORDER — METHYLPREDNISOLONE 4 MG/1
TABLET ORAL
Qty: 21 TABLET | Refills: 0 | Status: SHIPPED | OUTPATIENT
Start: 2025-07-08 | End: 2026-07-08

## 2025-07-08 RX ORDER — BENZONATATE 100 MG/1
CAPSULE ORAL
Qty: 45 CAPSULE | Refills: 0 | Status: SHIPPED | OUTPATIENT
Start: 2025-07-08

## 2025-07-08 NOTE — PROGRESS NOTES
Subjective:       Patient ID: Georgia Gomez is a 60 y.o. female.  Chief Complaint: Sinus Problem     HPI    + covid dx 3 weeks ago.   Paxlovid.  Got better, then slightly worse, then better.  Now for past few days, she has gotten worse with a dark green sputum cough and nasal drainage.  + hoarseness.  No fever, chills, body aches or fatigue.      Assessment:       1. Acute bacterial sinusitis    2. Lower respiratory infection        Plan:       Acute bacterial sinusitis  -     cefUROXime (CEFTIN) 500 MG tablet; Take 1 tablet (500 mg total) by mouth 2 (two) times daily. for 10 days  Dispense: 20 tablet; Refill: 0  -     methylPREDNISolone (MEDROL DOSEPACK) 4 mg tablet; Take as directed  Dispense: 21 tablet; Refill: 0  -     benzonatate (TESSALON) 100 MG capsule; 1 - 2 po every 6 hours prn cough  Dispense: 45 capsule; Refill: 0    Lower respiratory infection  -     cefUROXime (CEFTIN) 500 MG tablet; Take 1 tablet (500 mg total) by mouth 2 (two) times daily. for 10 days  Dispense: 20 tablet; Refill: 0  -     methylPREDNISolone (MEDROL DOSEPACK) 4 mg tablet; Take as directed  Dispense: 21 tablet; Refill: 0  -     benzonatate (TESSALON) 100 MG capsule; 1 - 2 po every 6 hours prn cough  Dispense: 45 capsule; Refill: 0            Continue current management and monitor.  Other diagnoses were reviewed and found stable and will continue to monitor.  Counseled on regular exercise, maintenance of a healthy weight, balanced diet rich in fruits/vegetables and lean protein, and avoidance of unhealthy habits like smoking and excessive alcohol intake.   Also, counseled on importance of being compliant with medication, health appointments, diet and exercise.     No follow-ups on file.      Medication List with Changes/Refills   New Medications    BENZONATATE (TESSALON) 100 MG CAPSULE    1 - 2 po every 6 hours prn cough    CEFUROXIME (CEFTIN) 500 MG TABLET    Take 1 tablet (500 mg total) by mouth 2 (two) times daily. for 10 days     METHYLPREDNISOLONE (MEDROL DOSEPACK) 4 MG TABLET    Take as directed   Current Medications    BIOTIN 5,000 MCG TBDL    Take 1 tablet by mouth once daily.     BUPROPION (WELLBUTRIN SR) 200 MG SR12    TAKE 1 TABLET BY MOUTH TWICE  DAILY    ESTRADIOL (LYLLANA) 0.0375 MG/24 HR        ESTRADIOL 0.05 MG/24 HR TD PTSW (VIVELLE-DOT) 0.05 MG/24 HR    Place 1 patch onto the skin twice a week.     ESTRADIOL 4 MCG INST    Place 1 Insert vaginally twice a week.    MULTIVITAMIN (THERAGRAN) PER TABLET    Take 1 tablet by mouth once daily.    TESTOSTERONE (ANDROGEL) 1 % (50 MG/5 GRAM) GLPK    Apply 5 g topically once daily.    TIRZEPATIDE, WEIGHT LOSS, (ZEPBOUND) 5 MG/0.5 ML SOLN    Inject 0.5 mLs (5 mg total) into the skin every 7 days.    VITAMIN D 1000 UNITS TAB    Take 1,000 Units by mouth once daily.       BP Readings from Last 3 Encounters:   07/08/25 108/66   05/29/25 97/63   05/12/25 104/66     Hemoglobin A1C   Date Value Ref Range Status   05/06/2024 5.0 4.0 - 5.6 % Final     Comment:     ADA Screening Guidelines:  5.7-6.4%  Consistent with prediabetes  >or=6.5%  Consistent with diabetes    High levels of fetal hemoglobin interfere with the HbA1C  assay. Heterozygous hemoglobin variants (HbS, HgC, etc)do  not significantly interfere with this assay.   However, presence of multiple variants may affect accuracy.     06/09/2023 5.0 4.0 - 5.6 % Final     Comment:     ADA Screening Guidelines:  5.7-6.4%  Consistent with prediabetes  >or=6.5%  Consistent with diabetes    High levels of fetal hemoglobin interfere with the HbA1C  assay. Heterozygous hemoglobin variants (HbS, HgC, etc)do  not significantly interfere with this assay.   However, presence of multiple variants may affect accuracy.     06/04/2020 5.1 4.0 - 5.6 % Final     Comment:     ADA Screening Guidelines:  5.7-6.4%  Consistent with prediabetes  >or=6.5%  Consistent with diabetes  High levels of fetal hemoglobin interfere with the HbA1C  assay. Heterozygous  hemoglobin variants (HbS, HgC, etc)do  not significantly interfere with this assay.   However, presence of multiple variants may affect accuracy.       Hemoglobin A1c   Date Value Ref Range Status   05/13/2025 5.1 4.0 - 5.6 % Final     Comment:     ADA Screening Guidelines:  5.7-6.4%  Consistent with prediabetes  >=6.5%  Consistent with diabetes    High levels of fetal hemoglobin interfere with the HbA1C  assay. Heterozygous hemoglobin variants (HbS, HgC, etc)do  not significantly interfere with this assay.   However, presence of multiple variants may affect accuracy.     Lab Results   Component Value Date    TSH 1.613 05/13/2025     Lab Results   Component Value Date    LDLCALC 106.0 05/13/2025    LDLCALC 94.6 05/06/2024    LDLCALC 94.0 06/09/2023     Lab Results   Component Value Date    TRIG 40 05/13/2025    TRIG 47 05/06/2024    TRIG 60 06/09/2023     Wt Readings from Last 3 Encounters:   07/08/25 60.4 kg (133 lb 2.5 oz)   05/29/25 64.4 kg (141 lb 13.9 oz)   05/12/25 65.2 kg (143 lb 11.8 oz)     Lab Results   Component Value Date    HGB 13.3 05/13/2025    HCT 40.5 05/13/2025    WBC 5.11 05/13/2025    ALT 15 05/13/2025    AST 21 05/13/2025     05/13/2025    K 4.7 05/13/2025    CREATININE 0.8 05/13/2025           Review of Systems        Objective:      Vitals:    07/08/25 0756   BP: 108/66   Pulse: 90   Temp: 97.8 °F (36.6 °C)     Physical Exam

## 2025-07-15 NOTE — TELEPHONE ENCOUNTER
Refill Routing Note   Medication(s) are not appropriate for processing by [facility] for the following reason(s):        New or recently adjusted medication    [facility] action(s):  Defer               Appointments  past 12m or future 3m with PCP    Date Provider   Last Visit   5/12/2025 Grace Cuello MD   Next Visit   Visit date not found Grace Cuello MD   ED visits in past 90 days: 0        Note composed:12:24 PM 07/15/2025

## 2025-07-16 RX ORDER — BUPROPION HYDROCHLORIDE 200 MG/1
200 TABLET, EXTENDED RELEASE ORAL 2 TIMES DAILY
Qty: 180 TABLET | Refills: 3 | Status: SHIPPED | OUTPATIENT
Start: 2025-07-16